# Patient Record
Sex: FEMALE | Race: WHITE | NOT HISPANIC OR LATINO | Employment: UNEMPLOYED | URBAN - METROPOLITAN AREA
[De-identification: names, ages, dates, MRNs, and addresses within clinical notes are randomized per-mention and may not be internally consistent; named-entity substitution may affect disease eponyms.]

---

## 2019-11-12 ENCOUNTER — OFFICE VISIT CONVERTED (OUTPATIENT)
Dept: FAMILY MEDICINE CLINIC | Facility: CLINIC | Age: 29
End: 2019-11-12
Attending: FAMILY MEDICINE

## 2019-11-12 ENCOUNTER — CONVERSION ENCOUNTER (OUTPATIENT)
Dept: FAMILY MEDICINE CLINIC | Facility: CLINIC | Age: 29
End: 2019-11-12

## 2020-09-28 ENCOUNTER — OFFICE VISIT CONVERTED (OUTPATIENT)
Dept: SURGERY | Facility: CLINIC | Age: 30
End: 2020-09-28
Attending: SURGERY

## 2020-10-29 ENCOUNTER — CONVERSION ENCOUNTER (OUTPATIENT)
Dept: FAMILY MEDICINE CLINIC | Facility: CLINIC | Age: 30
End: 2020-10-29

## 2020-10-29 ENCOUNTER — OFFICE VISIT CONVERTED (OUTPATIENT)
Dept: FAMILY MEDICINE CLINIC | Facility: CLINIC | Age: 30
End: 2020-10-29
Attending: FAMILY MEDICINE

## 2020-10-31 ENCOUNTER — HOSPITAL ENCOUNTER (OUTPATIENT)
Dept: PREADMISSION TESTING | Facility: HOSPITAL | Age: 30
Discharge: HOME | End: 2020-10-31
Attending: SURGERY

## 2020-11-04 LAB — SARS-COV-2 RNA SPEC QL NAA+PROBE: NOT DETECTED

## 2020-11-05 ENCOUNTER — HOSPITAL ENCOUNTER (OUTPATIENT)
Dept: PERIOP | Facility: HOSPITAL | Age: 30
Setting detail: HOSPITAL OUTPATIENT SURGERY
Discharge: HOME | End: 2020-11-05
Attending: SURGERY

## 2020-11-05 LAB — HCG UR QL: NEGATIVE

## 2021-01-19 ENCOUNTER — CONVERSION ENCOUNTER (OUTPATIENT)
Dept: FAMILY MEDICINE CLINIC | Facility: CLINIC | Age: 31
End: 2021-01-19

## 2021-01-19 ENCOUNTER — OFFICE VISIT CONVERTED (OUTPATIENT)
Dept: FAMILY MEDICINE CLINIC | Facility: CLINIC | Age: 31
End: 2021-01-19
Attending: FAMILY MEDICINE

## 2021-02-18 ENCOUNTER — HOSPITAL ENCOUNTER (OUTPATIENT)
Dept: LAB | Facility: HOSPITAL | Age: 31
Discharge: HOME | End: 2021-02-18
Attending: OTOLARYNGOLOGY

## 2021-02-18 LAB
APTT BLD: 24 S (ref 22.2–34.2)
BASOPHILS # BLD AUTO: 0.05 10*3/UL (ref 0–0.2)
BASOPHILS NFR BLD AUTO: 0.5 % (ref 0–3)
CONV ABS IMM GRAN: 0.03 10*3/UL (ref 0–0.2)
CONV IMMATURE GRAN: 0.3 % (ref 0–1.8)
DEPRECATED RDW RBC AUTO: 46.3 FL (ref 36.4–46.3)
EOSINOPHIL # BLD AUTO: 0.15 10*3/UL (ref 0–0.7)
EOSINOPHIL # BLD AUTO: 1.4 % (ref 0–7)
ERYTHROCYTE [DISTWIDTH] IN BLOOD BY AUTOMATED COUNT: 14.6 % (ref 11.7–14.4)
HCG SERPL-SCNC: NEGATIVE MMOL/L
HCT VFR BLD AUTO: 42.4 % (ref 37–47)
HGB BLD-MCNC: 13.3 G/DL (ref 12–16)
INR PPP: 0.9 (ref 2–3)
LYMPHOCYTES # BLD AUTO: 3.83 10*3/UL (ref 1–5)
LYMPHOCYTES NFR BLD AUTO: 35.4 % (ref 20–45)
MCH RBC QN AUTO: 27.2 PG (ref 27–31)
MCHC RBC AUTO-ENTMCNC: 31.4 G/DL (ref 33–37)
MCV RBC AUTO: 86.7 FL (ref 81–99)
MONOCYTES # BLD AUTO: 0.69 10*3/UL (ref 0.2–1.2)
MONOCYTES NFR BLD AUTO: 6.4 % (ref 3–10)
NEUTROPHILS # BLD AUTO: 6.06 10*3/UL (ref 2–8)
NEUTROPHILS NFR BLD AUTO: 56 % (ref 30–85)
NRBC CBCN: 0 % (ref 0–0.7)
PLATELET # BLD AUTO: 447 10*3/UL (ref 130–400)
PMV BLD AUTO: 10 FL (ref 9.4–12.3)
PROTHROMBIN TIME: 10.1 S (ref 9.4–12)
RBC # BLD AUTO: 4.89 10*6/UL (ref 4.2–5.4)
WBC # BLD AUTO: 10.81 10*3/UL (ref 4.8–10.8)

## 2021-02-19 LAB — SARS-COV-2 AB SERPL QL IA: POSITIVE

## 2021-02-22 ENCOUNTER — HOSPITAL ENCOUNTER (OUTPATIENT)
Dept: PREADMISSION TESTING | Facility: HOSPITAL | Age: 31
Discharge: HOME | End: 2021-02-22
Attending: OTOLARYNGOLOGY

## 2021-02-23 LAB — SARS-COV-2 RNA SPEC QL NAA+PROBE: NOT DETECTED

## 2021-02-26 ENCOUNTER — HOSPITAL ENCOUNTER (OUTPATIENT)
Dept: PERIOP | Facility: HOSPITAL | Age: 31
Setting detail: HOSPITAL OUTPATIENT SURGERY
Discharge: HOME | End: 2021-02-26
Attending: OTOLARYNGOLOGY

## 2021-02-26 LAB — HCG UR QL: NEGATIVE

## 2021-05-10 NOTE — H&P
History and Physical      Patient Name: Karine Swanson   Patient ID: [id number]   Sex: Female   YOB: 1990        Visit Date: September 28, 2020    Provider: Maninder Mallory MD   Location: [facility]   Location Address: [address] [place] [address]  [id number]   Location Phone: [phone]          Chief Complaint  · Outpatient History & Physical / Surgical Orders  · Gallbladder Consult      History Of Present Illness  Karine Swanson is a 30 year old /White female who presents to the office today as a consult from one of the local ER doctors.      The patient was referred by one of the local ER physicians after the patient was in the emergency room on 9/21/20 with right upper quadrant abdominal pain that radiated to her right mid back.  Ultrasound was done and showed gallstones.  No evidence of cholecystitis.  Common bile duct was about 4 mm in diameter.  White count was normal at 9 and LFTs normal except alk phos was mildly elevated at 110.  Patient says her abdominal pain is better now but anytime she eats certain food the abdominal pain returns.  She has had right upper quadrant abdominal pain on and off for several years and the pain is occurring more frequently and the pain has been getting gradually worse.  The patient is Islam.  She has never had any abdominal surgeries and she has 3 children.       Past Medical History  Disease Name Date Onset Notes   *No Pertinent Past Medical History --  --    Bladder Disorder --  --    GERD --  --    Limb Pain --  --    Limb Swelling --  --    Mood disorder --  --          Past Surgical History  Procedure Name Date Notes   *Denies any surgical procedures --  --    *I have had no surgeries --  --          Medication List  Name Date Started Instructions   amoxicillin 500 mg oral tablet 11/12/2019 take 1 tablet (500 mg) by oral route 3 times per day for 7 days   multivitamin oral  --          Allergy List  Allergen Name Date  "Reaction Notes   NO KNOWN DRUG ALLERGIES --  --  --          Family Medical History  Disease Name Relative/Age Notes   Heart Disease  --    Prostate cancer Grandfather (paternal)/   Grandfather (paternal)   Bladder calculus Mother/   Mother         Social History  Finding Status Start/Stop Quantity Notes   Alcohol Never --/-- --  --    Has no physical impairment --  --/-- --  --     --  --/-- --  --    Tobacco Never --/-- --  --          Review of Systems  · Constitutional  o Denies  o : fever, chills  · Gastrointestinal  o Denies  o : nausea, vomiting      Vitals  Date Time BP Position Site L\R Cuff Size HR RR TEMP (F) WT  HT  BMI kg/m2 BSA m2 O2 Sat HC       09/28/2020 11:45 AM       16  248lbs 0oz 5'  3\" 43.93 2.24           Physical Examination  · Constitutional  o Appearance  o : father-in-law present in room, alert and in no acute distress, reliable historian  · Head and Face  o Head  o :   § Inspection  § : no visable deformities or lesions  · Eyes  o Conjunctivae  o : clear  o Sclerae  o : clear  · Neck  o Inspection/Palpation  o : normal appearance, no masses, trachea midline  · Respiratory  o Respiratory Effort  o : breathing unlabored, respiratory effort appears normal  o Inspection of Chest  o : normal appearance, no retractions  · Cardiovascular  o Heart  o : regular rate and rhythm  · Gastrointestinal  o Abdominal Examination  o : soft, NT, ND  · Skin and Subcutaneous Tissue  o General Inspection  o : no visible concerning rashes or lesions present  · Neurologic  o Cranial Nerves  o : no obvious motor deficits  o Sensation  o : no obvious sensory deficits  o Gait and Station  o :   § Gait Screening  § : normal gait, able to stand without diffculty  o Cerebellar Function  o : no obvious abnormalities  · Psychiatric  o Judgment and Insight  o : judgment and insight intact  o Mood and Affect  o : mood normal, affect appropriate          Assessment  · Pre-Surgical Orders     V72.84  · Preop " testing     V72.84/Z01.818  · Symptomatic cholelithiasis     574.20/K80.20    Problems Reconciled  Plan  · Orders  o GENERAL SURGERY (GNSUR) - V72.84 - 10/15/2020  o [facility] Pre-Op Covid-19 Screening (16478) - V72.84/Z01.818 - 10/10/2020   at 12-noon.  · Medications  o Medications have been Reconciled  o Transition of Care or Provider Policy  · Instructions  o PLAN: Laparoscopic Cholecystectomy with Intraoperative Cholangiogram.  o PLEASE SIGN PERMIT FOR: Laparoscopic Cholecystectomy with Intraoperative Cholangiogram.  o Anesthesia: General   o Outpatient  o O.R. PREP: Per protocol  o IV: Per Anesthesia  o SCD's preoperatively  o No antibiotic is needed.  o The indications, options, risks, benefits, and expected outcomes of the planned procedure were discussed with the patient and the patient agrees to proceed.   o Electronically Identified Patient Education Materials Provided Electronically            Electronically Signed by: Maninder Mallory MD -Author on September 28, 2020 12:10:31 PM

## 2021-05-13 NOTE — PROGRESS NOTES
Progress Note      Patient Name: Karine Swanson   Patient ID: [id number]   Sex: Female   YOB: 1990    Primary Care Provider: Ethan Smith III MD    Visit Date: October 29, 2020    Provider: Ethan Smith III MD   Location: [facility] [place] [facility]   Location Address: [address] [place] [address]  [id number]   Location Phone: [phone]          Chief Complaint  · increased depression, crying more, not enjoying things      History Of Present Illness  Karine Swanson is a 30 year old /White female who presents for evaluation and treatment of: here for increased depression and anxiety, crying more and feeling bad about herself, not enjoying things. Pt states that she gets worse after each birth, she has 3 children under 3 with the youngest being 5 months. She would like to discuss getting her tonsils out. She is scheduled to have her gallbladder removed next Thursday.      HPI     patient is 30-year-old history of 3 children under the age of 3.  She has increased sadness and it seems worse after every new baby.  no suicidal ideation.  She is to have her gallbladder removed in a few days.      Review of systems     psych increased anhedonia, increased sadness, increase crying, decreased exercise recently.  No suicidal ideation  ENT recurrent strep, positive pharyngitis several a year would like tonsillectomy.    can get birth control at [facility] department.    Physical exam    Pulse ox is 98 heart rate 91 temperature 99.5 blood pressure 112/72 weight is 252 ear nose and throat tonsils are large.  Cardiovascular regular rhythm no murmur  Respiratory no increased work of breathing lungs clear and equal bilaterally  Abdomen soft nontender  Neurologic mentation is normal speech is normal  Psych patient appears depressed.  Depression screen is 15      Assessment     #1 unipolar depression needs will start on Prozac   #2 cholecystitis needs and is going to have her gallbladder  "surgery   #3 pharyngitis needs a tonsillectomy will need to get through her gallbladder surgery first and the depression.    We will see her again in 6 weeks    Plan     Prozac 10 mg daily for 1 week and then 20 mg daily.  I  ncrease exercise to 30-minute walk 5 days a week.    Recheck in 6 weeks.    Then we will schedule to have her tonsils evaluated.       Past Medical History  Disease Name Date Onset Notes   *No Pertinent Past Medical History --  --    Bladder Disorder --  --    GERD --  --    Limb Pain --  --    Limb Swelling --  --    Mood disorder --  --          Past Surgical History  Procedure Name Date Notes   *Denies any surgical procedures --  --    *I have had no surgeries --  --          Medication List  Name Date Started Instructions   multivitamin oral  --          Allergy List  Allergen Name Date Reaction Notes   NO KNOWN DRUG ALLERGIES --  --  --        Allergies Reconciled  Family Medical History  Disease Name Relative/Age Notes   Heart Disease  --    Prostate cancer Grandfather (paternal)/   Grandfather (paternal)   Bladder calculus Mother/   Mother         Social History  Finding Status Start/Stop Quantity Notes   Alcohol Never --/-- --  --    Has no physical impairment --  --/-- --  --     --  --/-- --  --    Tobacco Never --/-- --  --          Review of Systems  · Constitutional  o * See HPI  · Eyes  o * See HPI  · HENT  o * See HPI  · Breasts  o * See HPI  · Cardiovascular  o * See HPI  · Respiratory  o * See HPI  · Gastrointestinal  o * See HPI  · Genitourinary  o * See HPI  · Integument  o * See HPI  · Neurologic  o * See HPI  · Musculoskeletal  o * See HPI  · Endocrine  o * See HPI  · Psychiatric  o * See HPI  · Heme-Lymph  o * See HPI  · Allergic-Immunologic  o * See HPI      Vitals  Date Time BP Position Site L\R Cuff Size HR RR TEMP (F) WT  HT  BMI kg/m2 BSA m2 O2 Sat FR L/min FiO2 HC       10/29/2020 10:25 /72 Sitting    91 - R  99.5 251lbs 16oz 5'  3\" 44.64 2.25 98 %  21%  "                 Assessment  · Screening for depression     V79.0/Z13.89  · Major depressive disorder     296.20/F32.2  · Refused influenza vaccine     V64.06/Z28.21  · Anxiety and depression       Anxiety disorder, unspecified     300.00/F41.9  Major depressive disorder, single episode, unspecified     300.00/F32.9  · Chronic sore throat     472.1/J31.2  · Hypertrophy of tonsils     474.11/J35.1    Problems Reconciled  Plan  · Orders  o ACO-39: Current medications updated and reviewed (, 1159F) - - 10/29/2020  o ACO-18: Positive screen for clinical depression using a standardized tool and a follow-up plan documented () - - 10/29/2020  o ACO-14: Influenza immunization was not administered for reasons documented [facility] () - - 10/29/2020  · Medications  o fluoxetine 20 mg oral capsule   SIG: take 1 capsule (20 mg) by oral route once daily in the evening for 30 days   DISP: (30) Capsule with 3 refills  Prescribed on 10/29/2020     o amoxicillin 500 mg oral tablet   SIG: take 1 tablet (500 mg) by oral route 3 times per day for 7 days   DISP: (21) tablets with 0 refills  Discontinued on 10/29/2020     o Medications have been Reconciled  o Transition of Care or Provider Policy  · Instructions  o Depression Screen completed and scanned into the EMR under the designated folder within the patient's documents.  o Today's PHQ-9 result is __15_  o The provider screening met the required time of 15 minutes.  o Patient is taking medications as prescribed and doing well.   o Take all medications as prescribed/directed.  o Patient instructed/educated on their diet and exercise program.  o Patient was educated/instructed on their diagnosis, treatment and medications prior to discharge from the clinic today.  o Bring all medicines with their bottles to each office visit.  o Time spent with the patient was 30 minutes, more than 50% face to face.  o Chronic conditions reviewed and taken into consideration for today's  treatment plan.  o Discussed Covid-19 precautions including, but not limited to, social distancing, avoid touching your face, and hand washing.             Electronically Signed by: Vicky Culp, -Author on October 29, 2020 06:42:58 PM  Electronically Co-signed by: Ethan Smith III MD -Reviewer on November 2, 2020 01:48:40 PM

## 2021-05-14 VITALS
SYSTOLIC BLOOD PRESSURE: 112 MMHG | DIASTOLIC BLOOD PRESSURE: 62 MMHG | TEMPERATURE: 97.9 F | BODY MASS INDEX: 41.82 KG/M2 | HEIGHT: 63 IN | WEIGHT: 236 LBS | HEART RATE: 87 BPM | OXYGEN SATURATION: 99 %

## 2021-05-14 VITALS — HEIGHT: 63 IN | RESPIRATION RATE: 16 BRPM | WEIGHT: 248 LBS | BODY MASS INDEX: 43.94 KG/M2

## 2021-05-14 VITALS
HEART RATE: 91 BPM | WEIGHT: 252 LBS | HEIGHT: 63 IN | DIASTOLIC BLOOD PRESSURE: 72 MMHG | OXYGEN SATURATION: 98 % | TEMPERATURE: 99.5 F | BODY MASS INDEX: 44.65 KG/M2 | SYSTOLIC BLOOD PRESSURE: 112 MMHG

## 2021-05-14 NOTE — PROGRESS NOTES
Progress Note      Patient Name: Karine Swanson   Patient ID: [id number]   Sex: Female   YOB: 1990    Primary Care Provider: Ethan Smith III MD    Visit Date: January 19, 2021    Provider: Ethan Smith III MD   Location: [facility]   Location Address: [address] [place] [address]  [id number]   Location Phone: [phone]          Chief Complaint  · pain in tonsils  · follow up depression      History Of Present Illness  Karine Swanson is a 30 year old /White female who presents for evaluation and treatment of:      HPI     patient is a 30-year-old was placed on Prozac 20 mg is doing much better feeling better less anhedonia less sadness patient has had also had had recurrent strep throats and at least 4 to 5-year.  Patient needs a tonsillectomy.  This is a recurrent problem.    Review of systems     cardiovascular no chest pain no palpitations  Respiratory no shortness of breath no dyspnea on exertion ENT throat was quite sore couple days ago not so much now.  History of many recurrent episodes of strep pharyngitis 1 week appointment Dr. Diggs for tonsillectomy.      Physical exam     pulse ox 99 blood pressure 112/62 heart rate 87 weight is 236 is a 24 pound weight loss.  States she is feeling better temperature 97.9  General looks much better.  Obviously depression is much better.  ENT very mild pharyngitis tonsils are big.  No adenopathy.  Ears are negative.    Cardiovascular regular rhythm no murmur    Assessment     #1 depression much better   #2 recurrent strep pharyngitis at least 4 to 5-year for several years needs a tonsillectomy     plan     #1 continue Prozac   #2 strep pharyngitis recurrent point with Dr. Ashton       Past Medical History  Disease Name Date Onset Notes   *No Pertinent Past Medical History --  --    Anxiety and depression 10/29/2020 --    Bladder Disorder --  --    Chronic sore throat 10/29/2020 --    GERD --  --    Limb Pain  "--  --    Limb Swelling --  --    Mood disorder --  --    Refused influenza vaccine 10/29/2020 --          Past Surgical History  Procedure Name Date Notes   *Denies any surgical procedures --  --    *I have had no surgeries --  --          Medication List  Name Date Started Instructions   fluoxetine 20 mg oral capsule 01/19/2021 take 1 capsule (20 mg) by oral route once daily in the evening for 30 days   multivitamin oral  --          Allergy List  Allergen Name Date Reaction Notes   NO KNOWN DRUG ALLERGIES --  --  --        Allergies Reconciled  Family Medical History  Disease Name Relative/Age Notes   Heart Disease  --    Prostate cancer Grandfather (paternal)/   Grandfather (paternal)   Bladder calculus Mother/   Mother         Social History  Finding Status Start/Stop Quantity Notes   Alcohol Never --/-- --  --    Has no physical impairment --  --/-- --  --     --  --/-- --  --    Tobacco Never --/-- --  --          Review of Systems  · Constitutional  o * See HPI  · Eyes  o * See HPI  · HENT  o * See HPI  · Breasts  o * See HPI  · Cardiovascular  o * See HPI  · Respiratory  o * See HPI  · Gastrointestinal  o * See HPI  · Genitourinary  o * See HPI  · Integument  o * See HPI  · Neurologic  o * See HPI  · Musculoskeletal  o * See HPI  · Endocrine  o * See HPI  · Psychiatric  o * See HPI  · Heme-Lymph  o * See HPI  · Allergic-Immunologic  o * See HPI      Vitals  Date Time BP Position Site L\R Cuff Size HR RR TEMP (F) WT  HT  BMI kg/m2 BSA m2 O2 Sat FR L/min FiO2 HC       01/19/2021 03:11 /62 Sitting    87 - R  97.9 236lbs 0oz 5'  3\" 41.81 2.18 99 %  21%              Results  · In-Office Procedures  o Lab procedure  § Rapid strep screen (10614)   § Beta Strep Gp A Culture: Negative   § Internal Control Verified?: Yes       Assessment  · Anxiety and depression       Anxiety disorder, unspecified     300.00/F41.9  Major depressive disorder, single episode, unspecified     300.00/F32.9  · Chronic sore " throat     472.1/J31.2  · Refused influenza vaccine     V64.06/Z28.21  · Hypertrophy of tonsil     474.11/J35.1  · Recurrent tonsillitis     463/J03.91  · Medication management     V58.69/Z79.899    Problems Reconciled  Plan  · Orders  o ACO-14: Influenza immunization was not administered for reasons documented [facility] () - - 01/19/2021  o ACO-39: Current medications updated and reviewed (, 1159F) - - 01/19/2021  o ENT CONSULTATION (ENT[facility]) - 474.11/J35.1, 463/J03.91, 472.1/J31.2 - 01/20/2021  · Medications  o fluoxetine 20 mg oral capsule   SIG: take 1 capsule (20 mg) by oral route once daily in the evening for 30 days   DISP: (30) Capsule with 11 refills  Refilled on 01/19/2021     o Medications have been Reconciled  o Transition of Care or Provider Policy  · Instructions  o Patient is taking medications as prescribed and doing well.   o Take all medications as prescribed/directed.  o Patient instructed/educated on their diet and exercise program.  o Patient was educated/instructed on their diagnosis, treatment and medications prior to discharge from the clinic today.  o Bring all medicines with their bottles to each office visit.  o Time spent with the patient was 20 minutes, more than 50% face to face.  o Chronic conditions reviewed and taken into consideration for today's treatment plan.  o Discussed Covid-19 precautions including, but not limited to, social distancing, avoid touching your face, and hand washing.             Electronically Signed by: Vicky Culp, -Author on January 19, 2021 06:04:24 PM  Electronically Co-signed by: Ethan Smith III MD -Reviewer on January 20, 2021 11:15:58 AM

## 2021-05-15 VITALS
WEIGHT: 225 LBS | HEART RATE: 114 BPM | OXYGEN SATURATION: 98 % | HEIGHT: 63 IN | TEMPERATURE: 98.8 F | BODY MASS INDEX: 39.87 KG/M2 | DIASTOLIC BLOOD PRESSURE: 72 MMHG | SYSTOLIC BLOOD PRESSURE: 110 MMHG

## 2021-11-18 RX ORDER — FLUOXETINE HYDROCHLORIDE 40 MG/1
40 CAPSULE ORAL DAILY
Qty: 90 CAPSULE | Refills: 3 | Status: SHIPPED | OUTPATIENT
Start: 2021-11-18 | End: 2022-09-13

## 2022-02-09 ENCOUNTER — OFFICE VISIT (OUTPATIENT)
Dept: FAMILY MEDICINE CLINIC | Facility: CLINIC | Age: 32
End: 2022-02-09

## 2022-02-09 VITALS
TEMPERATURE: 97.8 F | HEIGHT: 63 IN | OXYGEN SATURATION: 97 % | SYSTOLIC BLOOD PRESSURE: 112 MMHG | DIASTOLIC BLOOD PRESSURE: 62 MMHG | WEIGHT: 278 LBS | HEART RATE: 102 BPM | BODY MASS INDEX: 49.26 KG/M2

## 2022-02-09 DIAGNOSIS — Z3A.28 28 WEEKS GESTATION OF PREGNANCY: Primary | ICD-10-CM

## 2022-02-09 PROBLEM — F32.A ANXIETY AND DEPRESSION: Status: ACTIVE | Noted: 2020-10-29

## 2022-02-09 PROBLEM — F41.9 ANXIETY AND DEPRESSION: Status: ACTIVE | Noted: 2020-10-29

## 2022-02-09 PROCEDURE — 99213 OFFICE O/P EST LOW 20 MIN: CPT | Performed by: FAMILY MEDICINE

## 2022-02-09 RX ORDER — AMOXICILLIN 875 MG/1
875 TABLET, COATED ORAL
COMMUNITY
Start: 2022-02-02 | End: 2022-02-12

## 2022-02-09 NOTE — PROGRESS NOTES
"Chief Complaint  Follow-up (needs referral to OBGYN, 28 week gestation went to the woman [facility] in [place] and one of babies is 15% smaller than the other.   )    SUBJECTIVE  Karine Swanson presents to [facility]    31-year-old has twins has 3 previous children some discordant growth about 15% needs OB/GYN will discuss with Dr. Sanabria    PAST MEDICAL HISTORY  No Known Allergies     No past surgical history on file.    Social History     Tobacco Use   • Smoking status: Never Smoker   • Smokeless tobacco: Never Used   Substance Use Topics   • Alcohol use: Never       No family history on file.     Health Maintenance Due   Topic Date Due   • ANNUAL PHYSICAL  Never done   • COVID-19 Vaccine (1) Never done   • TDAP/TD VACCINES (1 - Tdap) Never done   • INFLUENZA VACCINE  08/01/2021   • HEPATITIS C SCREENING  Never done   • PAP SMEAR  Never done        Last Completed Colonoscopy     This patient has no relevant Health Maintenance data.          REVIEW OF SYSTEMS    Cardiovascular no chest pain no palpitation  Respiratory no shortness of breath dyspnea on exertion    OBJECTIVE  Vitals:    02/09/22 1415   BP: 112/62   Pulse: 102   Temp: 97.8 °F (36.6 °C)   SpO2: 97%   Weight: 126 kg (278 lb)   Height: 160 cm (63\")     Body mass index is 49.25 kg/m².    PHYSICAL EXAM    General no distress  Cardiovascular regular rhythm no murmur  Respiratory lungs clear and equal bilaterally  Abdomen twin pregnancy 28 weeks soft nontender    ASSESSMENT & PLAN  Diagnoses and all orders for this visit:    1. 28 weeks gestation of pregnancy (Primary)  -     Ambulatory Referral to Obstetrics / Gynecology          Assessment twin pregnancy 28 weeks some discordant growth about 15% will discuss with OB/GYN tomorrow            Patient was given instructions and counseling regarding her condition or for health maintenance advice. Please see specific information pulled into the AVS if appropriate.   "

## 2022-02-10 ENCOUNTER — TELEPHONE (OUTPATIENT)
Dept: OBSTETRICS AND GYNECOLOGY | Facility: CLINIC | Age: 32
End: 2022-02-10

## 2022-02-10 NOTE — TELEPHONE ENCOUNTER
Called patient to tell her about her appt with Dr Retana 2/16 to be here at 8:45. Left message with neighbor.

## 2022-02-16 ENCOUNTER — INITIAL PRENATAL (OUTPATIENT)
Dept: OBSTETRICS AND GYNECOLOGY | Facility: CLINIC | Age: 32
End: 2022-02-16

## 2022-02-16 VITALS
WEIGHT: 278 LBS | DIASTOLIC BLOOD PRESSURE: 66 MMHG | BODY MASS INDEX: 46.32 KG/M2 | HEIGHT: 65 IN | SYSTOLIC BLOOD PRESSURE: 101 MMHG

## 2022-02-16 DIAGNOSIS — O30.049 TWIN PREGNANCY, DICHORIONIC/DIAMNIOTIC, UNSPECIFIED TRIMESTER: Primary | ICD-10-CM

## 2022-02-16 DIAGNOSIS — E66.9 OBESITY (BMI 30-39.9): ICD-10-CM

## 2022-02-16 LAB
ABO GROUP BLD: NORMAL
BASOPHILS # BLD AUTO: 0.04 10*3/MM3 (ref 0–0.2)
BASOPHILS NFR BLD AUTO: 0.3 % (ref 0–1.5)
BLD GP AB SCN SERPL QL: NEGATIVE
DEPRECATED RDW RBC AUTO: 39.4 FL (ref 37–54)
EOSINOPHIL # BLD AUTO: 0.41 10*3/MM3 (ref 0–0.4)
EOSINOPHIL NFR BLD AUTO: 3.3 % (ref 0.3–6.2)
ERYTHROCYTE [DISTWIDTH] IN BLOOD BY AUTOMATED COUNT: 13.6 % (ref 12.3–15.4)
GLUCOSE 1H P GLC SERPL-MCNC: 121 MG/DL (ref 65–139)
GLUCOSE UR STRIP-MCNC: NEGATIVE MG/DL
HBV SURFACE AG SERPL QL IA: NORMAL
HCT VFR BLD AUTO: 36.4 % (ref 34–46.6)
HCV AB SER DONR QL: NORMAL
HGB BLD-MCNC: 12.1 G/DL (ref 12–15.9)
HIV1+2 AB SER QL: NORMAL
IMM GRANULOCYTES # BLD AUTO: 0.3 10*3/MM3 (ref 0–0.05)
IMM GRANULOCYTES NFR BLD AUTO: 2.4 % (ref 0–0.5)
LYMPHOCYTES # BLD AUTO: 2.41 10*3/MM3 (ref 0.7–3.1)
LYMPHOCYTES NFR BLD AUTO: 19.4 % (ref 19.6–45.3)
MCH RBC QN AUTO: 27.2 PG (ref 26.6–33)
MCHC RBC AUTO-ENTMCNC: 33.2 G/DL (ref 31.5–35.7)
MCV RBC AUTO: 81.8 FL (ref 79–97)
MONOCYTES # BLD AUTO: 0.61 10*3/MM3 (ref 0.1–0.9)
MONOCYTES NFR BLD AUTO: 4.9 % (ref 5–12)
NEUTROPHILS NFR BLD AUTO: 69.7 % (ref 42.7–76)
NEUTROPHILS NFR BLD AUTO: 8.67 10*3/MM3 (ref 1.7–7)
NRBC BLD AUTO-RTO: 0.1 /100 WBC (ref 0–0.2)
PLATELET # BLD AUTO: 387 10*3/MM3 (ref 140–450)
PMV BLD AUTO: 9.4 FL (ref 6–12)
PROT UR STRIP-MCNC: NEGATIVE MG/DL
RBC # BLD AUTO: 4.45 10*6/MM3 (ref 3.77–5.28)
RH BLD: POSITIVE
WBC NRBC COR # BLD: 12.44 10*3/MM3 (ref 3.4–10.8)

## 2022-02-16 PROCEDURE — 86803 HEPATITIS C AB TEST: CPT | Performed by: OBSTETRICS & GYNECOLOGY

## 2022-02-16 PROCEDURE — 87147 CULTURE TYPE IMMUNOLOGIC: CPT | Performed by: OBSTETRICS & GYNECOLOGY

## 2022-02-16 PROCEDURE — 82950 GLUCOSE TEST: CPT | Performed by: OBSTETRICS & GYNECOLOGY

## 2022-02-16 PROCEDURE — 99204 OFFICE O/P NEW MOD 45 MIN: CPT | Performed by: OBSTETRICS & GYNECOLOGY

## 2022-02-16 PROCEDURE — G0432 EIA HIV-1/HIV-2 SCREEN: HCPCS | Performed by: OBSTETRICS & GYNECOLOGY

## 2022-02-16 PROCEDURE — 87661 TRICHOMONAS VAGINALIS AMPLIF: CPT | Performed by: OBSTETRICS & GYNECOLOGY

## 2022-02-16 PROCEDURE — 87591 N.GONORRHOEAE DNA AMP PROB: CPT | Performed by: OBSTETRICS & GYNECOLOGY

## 2022-02-16 PROCEDURE — 81002 URINALYSIS NONAUTO W/O SCOPE: CPT | Performed by: OBSTETRICS & GYNECOLOGY

## 2022-02-16 PROCEDURE — 86850 RBC ANTIBODY SCREEN: CPT | Performed by: OBSTETRICS & GYNECOLOGY

## 2022-02-16 PROCEDURE — G0123 SCREEN CERV/VAG THIN LAYER: HCPCS | Performed by: OBSTETRICS & GYNECOLOGY

## 2022-02-16 PROCEDURE — 86901 BLOOD TYPING SEROLOGIC RH(D): CPT | Performed by: OBSTETRICS & GYNECOLOGY

## 2022-02-16 PROCEDURE — 87086 URINE CULTURE/COLONY COUNT: CPT | Performed by: OBSTETRICS & GYNECOLOGY

## 2022-02-16 PROCEDURE — 86900 BLOOD TYPING SEROLOGIC ABO: CPT | Performed by: OBSTETRICS & GYNECOLOGY

## 2022-02-16 PROCEDURE — 86762 RUBELLA ANTIBODY: CPT | Performed by: OBSTETRICS & GYNECOLOGY

## 2022-02-16 PROCEDURE — 87491 CHLMYD TRACH DNA AMP PROBE: CPT | Performed by: OBSTETRICS & GYNECOLOGY

## 2022-02-16 PROCEDURE — 83020 HEMOGLOBIN ELECTROPHORESIS: CPT | Performed by: OBSTETRICS & GYNECOLOGY

## 2022-02-16 NOTE — PROGRESS NOTES
"OB Initial Visit    CC- Here for care of current pregnancy     Dates based on LMP.  Ultrasound at [facility] at approximately 27 weeks notes 15% discordance and confirms EDC.  Patient prefers delivery at hospital, midwife uncomfortable with twins with discordance.  We have fully discussed that 75% risk of  with nonvertex presentation.  Pelvis proven to 8 pounds 11 ounces.    Subjective:  31 y.o.  presenting for her first obstetrical visit.    LMP: No LMP recorded (lmp unknown). Patient is pregnant.         Reviewed and updated:  OBHx, GYNHx (STDs), PMHx, Medications, Allergies, PSHx, Social Hx, Preventative Hx (PAP), Hx of abuse/safe environment, Vaccine Hx including hx of chickenpox or vaccine, Genetic Hx (pt, FOB, both families).        Objective:  /66   Ht 165.1 cm (65\")   Wt 126 kg (278 lb)   LMP  (LMP Unknown)   BMI 46.26 kg/m²   General- NAD, alert and oriented, appropriate  Psych- Normal mood, good memory  Neck- No masses, no thyroid enlargement  CV- Regular rhythm, no murnurs  Resp- CTA to bases, no wheezes  Abdomen- Soft, non distended, non tender,     Breast left- No mass, non tender, no nipple discharge  Breast right- No mass, non tender, no nipple discharge    External genitalia- Normal, no lesions  Urethra- Normal, no masses, non tender  Vagina- Normal, no discharge  Bladder- Normal, no masses, non tender  Cvx- Normal, no lesions, no discharge, no CMT    Closed cervix    Uterus-gravid with twins    Fundal height 35 cm    Adnexa- Normal, no mass, non tender    Lymphatic- No palpable neck, axillary, or groin nodes  Ext- No edema, no cyanosis    Skin- No lesions, no rashes, no acanthosis nigricans      Assessment and Plan:  Diagnoses and all orders for this visit:    1. Twin pregnancy, dichorionic/diamniotic, unspecified trimester (Primary)  -     POC Urinalysis Dipstick  -     Urine Culture - Urine, Urine, Clean Catch  -     OB Panel With HIV  -     Hemoglobinopathy Fractionation " [place]  -     IGP,CtNg,rfx Apt HPV All Pth  -     US Ob 14 + Weeks Single or First Gestation; Future    2. Obesity (BMI 30-39.9)        29w2d    Genetic Screening: Counseled.  Declines all.     Vaccines: Pt declines FLU vaccine  Declines COVID vaccine    Counseling: Discussed risk of  with nonvertex presentation    Labs: Prenatal labs, cultures, and PAP performed (prn)    Return in about 2 weeks (around 3/2/2022).  And ultrasound for discordance    Nii Retana Sr., MD  2022

## 2022-02-17 LAB
BACTERIA SPEC AEROBE CULT: ABNORMAL
HGB A MFR BLD ELPH: 97.5 % (ref 96.4–98.8)
HGB A2 MFR BLD ELPH: 2.5 % (ref 1.8–3.2)
HGB F MFR BLD ELPH: 0 % (ref 0–2)
HGB FRACT BLD-IMP: NORMAL
HGB S MFR BLD ELPH: 0 %
RPR SER QL: NORMAL
RUBV IGG SERPL IA-ACNC: <0.9 INDEX

## 2022-02-21 LAB
C TRACH RRNA CVX QL NAA+PROBE: NEGATIVE
CONV .: NORMAL
CYTOLOGIST CVX/VAG CYTO: NORMAL
CYTOLOGY CVX/VAG DOC CYTO: NORMAL
CYTOLOGY CVX/VAG DOC THIN PREP: NORMAL
DX ICD CODE: NORMAL
HIV 1 & 2 AB SER-IMP: NORMAL
N GONORRHOEA RRNA CVX QL NAA+PROBE: NEGATIVE
OTHER STN SPEC: NORMAL
STAT OF ADQ CVX/VAG CYTO-IMP: NORMAL
T VAGINALIS RRNA SPEC QL NAA+PROBE: NEGATIVE

## 2022-02-28 ENCOUNTER — TELEPHONE (OUTPATIENT)
Dept: OBSTETRICS AND GYNECOLOGY | Facility: CLINIC | Age: 32
End: 2022-02-28

## 2022-03-03 ENCOUNTER — ROUTINE PRENATAL (OUTPATIENT)
Dept: OBSTETRICS AND GYNECOLOGY | Facility: CLINIC | Age: 32
End: 2022-03-03

## 2022-03-03 VITALS — DIASTOLIC BLOOD PRESSURE: 74 MMHG | WEIGHT: 283 LBS | BODY MASS INDEX: 47.09 KG/M2 | SYSTOLIC BLOOD PRESSURE: 108 MMHG

## 2022-03-03 DIAGNOSIS — O30.049 TWIN PREGNANCY, DICHORIONIC/DIAMNIOTIC, UNSPECIFIED TRIMESTER: Primary | ICD-10-CM

## 2022-03-03 DIAGNOSIS — R82.71 GBS BACTERIURIA: ICD-10-CM

## 2022-03-03 LAB
GLUCOSE UR STRIP-MCNC: NEGATIVE MG/DL
PROT UR STRIP-MCNC: NEGATIVE MG/DL

## 2022-03-03 PROCEDURE — 99214 OFFICE O/P EST MOD 30 MIN: CPT | Performed by: OBSTETRICS & GYNECOLOGY

## 2022-03-03 PROCEDURE — 81002 URINALYSIS NONAUTO W/O SCOPE: CPT | Performed by: OBSTETRICS & GYNECOLOGY

## 2022-03-03 RX ORDER — AMOXICILLIN 500 MG/1
500 CAPSULE ORAL 2 TIMES DAILY
Qty: 10 CAPSULE | Refills: 0 | Status: ON HOLD | OUTPATIENT
Start: 2022-03-03 | End: 2022-03-25

## 2022-03-03 NOTE — PROGRESS NOTES
Chief Complaint:  Scheduled OB visit    HPI: 31 y.o.  at 31w3d   TWINS  Positive baby movement, hard to differentiate twins.    Vitals:    22 1556   BP: 108/74   Weight: 128 kg (283 lb)       See OB flowsheet also for pregnancy related data.  Ultrasound today notes less than 1% discordance.  Estimated fetal weights are approximately 55th percentile.  Baby is currently vertex, breech.    A/P  Intrauterine pregnancy at 31w3d   Diagnoses and all orders for this visit:    1. Twin pregnancy, dichorionic/diamniotic, unspecified trimester (Primary)  -     POC Urinalysis Dipstick  -     US Ob 14 + Weeks Single or First Gestation; Future    2. GBS bacteriuria  -     amoxicillin (AMOXIL) 500 MG capsule; Take 1 capsule by mouth 2 (Two) Times a Day.  Dispense: 10 capsule; Refill: 0        Continue prenatal vitamins.  Encouraged fetal kick counts, 10 movements in 2 hours every day.  To labor and delivery if lack fetal movement    PLAN:   Return in about 2 weeks (around 3/17/2022).     Nii Retana Sr., MD  3/3/2022 16:31 EST

## 2022-03-17 ENCOUNTER — ROUTINE PRENATAL (OUTPATIENT)
Dept: OBSTETRICS AND GYNECOLOGY | Facility: CLINIC | Age: 32
End: 2022-03-17

## 2022-03-17 VITALS — BODY MASS INDEX: 49.42 KG/M2 | DIASTOLIC BLOOD PRESSURE: 75 MMHG | SYSTOLIC BLOOD PRESSURE: 113 MMHG | WEIGHT: 293 LBS

## 2022-03-17 DIAGNOSIS — O99.713 PRURITUS GRAVIDARUM, THIRD TRIMESTER: ICD-10-CM

## 2022-03-17 DIAGNOSIS — O30.049 TWIN PREGNANCY, DICHORIONIC/DIAMNIOTIC, UNSPECIFIED TRIMESTER: Primary | ICD-10-CM

## 2022-03-17 DIAGNOSIS — L29.9 PRURITUS GRAVIDARUM, THIRD TRIMESTER: ICD-10-CM

## 2022-03-17 LAB
DEPRECATED RDW RBC AUTO: 41.4 FL (ref 37–54)
ERYTHROCYTE [DISTWIDTH] IN BLOOD BY AUTOMATED COUNT: 14.1 % (ref 12.3–15.4)
GLUCOSE UR STRIP-MCNC: NEGATIVE MG/DL
HCT VFR BLD AUTO: 36.7 % (ref 34–46.6)
HGB BLD-MCNC: 11.5 G/DL (ref 12–15.9)
MCH RBC QN AUTO: 25.6 PG (ref 26.6–33)
MCHC RBC AUTO-ENTMCNC: 31.3 G/DL (ref 31.5–35.7)
MCV RBC AUTO: 81.7 FL (ref 79–97)
PLATELET # BLD AUTO: 315 10*3/MM3 (ref 140–450)
PMV BLD AUTO: 10.2 FL (ref 6–12)
PROT UR STRIP-MCNC: ABNORMAL MG/DL
RBC # BLD AUTO: 4.49 10*6/MM3 (ref 3.77–5.28)
WBC NRBC COR # BLD: 13.13 10*3/MM3 (ref 3.4–10.8)

## 2022-03-17 PROCEDURE — 82239 BILE ACIDS TOTAL: CPT | Performed by: OBSTETRICS & GYNECOLOGY

## 2022-03-17 PROCEDURE — 83540 ASSAY OF IRON: CPT | Performed by: OBSTETRICS & GYNECOLOGY

## 2022-03-17 PROCEDURE — 85027 COMPLETE CBC AUTOMATED: CPT | Performed by: OBSTETRICS & GYNECOLOGY

## 2022-03-17 PROCEDURE — 99214 OFFICE O/P EST MOD 30 MIN: CPT | Performed by: OBSTETRICS & GYNECOLOGY

## 2022-03-17 NOTE — PROGRESS NOTES
Chief Complaint:  Scheduled OB visit    HPI: 31 y.o.  at 33w3d   Positive baby movement  Patient has been lightheaded and has had some headaches.  We discussed increasing p.o. hydration.  Also complained of pain secondary to edema at pannus.  Also complains of itching off and on    Vitals:    22 1141   BP: 113/75   Weight: 135 kg (297 lb)       See OB flowsheet also for pregnancy related data.    A/P  Intrauterine pregnancy at 33w3d   Diagnoses and all orders for this visit:    1. Twin pregnancy, dichorionic/diamniotic, unspecified trimester (Primary)  -     POC Urinalysis Dipstick  -     Iron; Future  -     CBC (No Diff)    2. Pruritus gravidarum, third trimester  -     Bile Acids, Total    Ultrasound is scheduled for next office visit for growth    Continue prenatal vitamins.  Encouraged fetal kick counts, 10 movements in 2 hours every day.  To labor and delivery if lack fetal movement   Increase p.o. hydration    PLAN:   Return in about 2 weeks (around 3/31/2022).    Nii Retana Sr., MD  3/17/2022 12:07 EDT

## 2022-03-18 LAB — IRON 24H UR-MRATE: 35 MCG/DL (ref 37–145)

## 2022-03-19 LAB — BILE AC SERPL-SCNC: 8.5 UMOL/L (ref 0–10)

## 2022-03-22 ENCOUNTER — TELEPHONE (OUTPATIENT)
Dept: OBSTETRICS AND GYNECOLOGY | Facility: CLINIC | Age: 32
End: 2022-03-22

## 2022-03-22 NOTE — TELEPHONE ENCOUNTER
----- Message from Nii Retana Sr., MD sent at 3/20/2022  9:48 PM EDT -----  Bile acids below 10 is normal.  ----- Message -----  From: Lyndsey Hagen MA  Sent: 3/17/2022  11:45 AM EDT  To: Nii Retana Sr., MD

## 2022-03-25 ENCOUNTER — HOSPITAL ENCOUNTER (INPATIENT)
Facility: HOSPITAL | Age: 32
LOS: 3 days | Discharge: HOME | End: 2022-03-28
Attending: OBSTETRICS & GYNECOLOGY | Admitting: OBSTETRICS & GYNECOLOGY

## 2022-03-25 ENCOUNTER — ANESTHESIA (OUTPATIENT)
Dept: LABOR AND DELIVERY | Facility: HOSPITAL | Age: 32
End: 2022-03-25

## 2022-03-25 ENCOUNTER — ANESTHESIA EVENT (OUTPATIENT)
Dept: LABOR AND DELIVERY | Facility: HOSPITAL | Age: 32
End: 2022-03-25

## 2022-03-25 PROBLEM — O34.219 PREVIOUS CESAREAN DELIVERY AFFECTING PREGNANCY: Status: ACTIVE | Noted: 2022-03-25

## 2022-03-25 PROBLEM — O60.03 PRETERM LABOR IN THIRD TRIMESTER: Status: ACTIVE | Noted: 2022-03-25

## 2022-03-25 PROBLEM — O32.2XX0 TRANSVERSE LIE OF FETUS: Status: ACTIVE | Noted: 2022-03-25

## 2022-03-25 LAB
ABO GROUP BLD: NORMAL
BASE EXCESS BLDCOA CALC-SCNC: -4.2 MMOL/L
BASE EXCESS BLDCOA CALC-SCNC: -7.2 MMOL/L
BASE EXCESS BLDCOV CALC-SCNC: -7.1 MMOL/L
BASE EXCESS BLDCOV CALC-SCNC: -9 MMOL/L
BILIRUB BLD-MCNC: NEGATIVE MG/DL
BLD GP AB SCN SERPL QL: NEGATIVE
CLARITY, POC: CLEAR
COLOR UR: YELLOW
DEPRECATED RDW RBC AUTO: 42.2 FL (ref 37–54)
ERYTHROCYTE [DISTWIDTH] IN BLOOD BY AUTOMATED COUNT: 14.7 % (ref 12.3–15.4)
GLUCOSE UR STRIP-MCNC: NEGATIVE MG/DL
HCO3 BLDCOA-SCNC: 23 MMOL/L
HCO3 BLDCOA-SCNC: 23 MMOL/L
HCO3 BLDCOV-SCNC: 19.4 MMOL/L
HCO3 BLDCOV-SCNC: 22.1 MMOL/L
HCT VFR BLD AUTO: 36.5 % (ref 34–46.6)
HGB BLD-MCNC: 11.9 G/DL (ref 12–15.9)
KETONES UR QL: NEGATIVE
LEUKOCYTE EST, POC: NEGATIVE
MCH RBC QN AUTO: 26 PG (ref 26.6–33)
MCHC RBC AUTO-ENTMCNC: 32.6 G/DL (ref 31.5–35.7)
MCV RBC AUTO: 79.7 FL (ref 79–97)
NITRITE UR-MCNC: NEGATIVE MG/ML
PCO2 BLDCOA: 50.1 MMHG (ref 33–49)
PCO2 BLDCOA: 67.6 MMHG (ref 33–49)
PCO2 BLDCOV: 42.9 MM HG (ref 28–40)
PCO2 BLDCOV: 72.4 MM HG (ref 28–40)
PH BLDCOA: 7.15 PH UNITS (ref 7.21–7.31)
PH BLDCOA: 7.28 PH UNITS (ref 7.21–7.31)
PH BLDCOV: 7.1 PH UNITS (ref 7.31–7.37)
PH BLDCOV: 7.27 PH UNITS (ref 7.31–7.37)
PH UR: 6 [PH] (ref 5–8)
PLATELET # BLD AUTO: 320 10*3/MM3 (ref 140–450)
PMV BLD AUTO: 10.3 FL (ref 6–12)
PO2 BLDCOA: <40.5 MMHG
PO2 BLDCOA: <40.5 MMHG
PO2 BLDCOV: <40.5 MM HG (ref 21–31)
PROT UR STRIP-MCNC: NEGATIVE MG/DL
RBC # BLD AUTO: 4.58 10*6/MM3 (ref 3.77–5.28)
RBC # UR STRIP: ABNORMAL /UL
RH BLD: POSITIVE
SARS-COV-2 RNA PNL SPEC NAA+PROBE: NOT DETECTED
SP GR UR: 1.02 (ref 1–1.03)
T&S EXPIRATION DATE: NORMAL
UROBILINOGEN UR QL: NORMAL
WBC NRBC COR # BLD: 15.09 10*3/MM3 (ref 3.4–10.8)

## 2022-03-25 PROCEDURE — 59515 CESAREAN DELIVERY: CPT | Performed by: OBSTETRICS & GYNECOLOGY

## 2022-03-25 PROCEDURE — 94799 UNLISTED PULMONARY SVC/PX: CPT

## 2022-03-25 PROCEDURE — 86900 BLOOD TYPING SEROLOGIC ABO: CPT | Performed by: OBSTETRICS & GYNECOLOGY

## 2022-03-25 PROCEDURE — 25010000002 METOCLOPRAMIDE PER 10 MG: Performed by: OBSTETRICS & GYNECOLOGY

## 2022-03-25 PROCEDURE — 25010000002 KETOROLAC TROMETHAMINE PER 15 MG: Performed by: ANESTHESIOLOGY

## 2022-03-25 PROCEDURE — 51702 INSERT TEMP BLADDER CATH: CPT

## 2022-03-25 PROCEDURE — 63710000001 DIPHENHYDRAMINE PER 50 MG: Performed by: ANESTHESIOLOGY

## 2022-03-25 PROCEDURE — 81002 URINALYSIS NONAUTO W/O SCOPE: CPT | Performed by: OBSTETRICS & GYNECOLOGY

## 2022-03-25 PROCEDURE — 25010000002 CEFAZOLIN PER 500 MG: Performed by: OBSTETRICS & GYNECOLOGY

## 2022-03-25 PROCEDURE — 0 MORPHINE PER 10 MG: Performed by: ANESTHESIOLOGY

## 2022-03-25 PROCEDURE — 25010000002 METHYLERGONOVINE MALEATE PER 0.2 MG: Performed by: OBSTETRICS & GYNECOLOGY

## 2022-03-25 PROCEDURE — 86850 RBC ANTIBODY SCREEN: CPT | Performed by: OBSTETRICS & GYNECOLOGY

## 2022-03-25 PROCEDURE — 25010000002 MIDAZOLAM PER 1 MG: Performed by: ANESTHESIOLOGY

## 2022-03-25 PROCEDURE — U0004 COV-19 TEST NON-CDC HGH THRU: HCPCS | Performed by: OBSTETRICS & GYNECOLOGY

## 2022-03-25 PROCEDURE — 25010000002 FENTANYL CITRATE (PF) 50 MCG/ML SOLUTION: Performed by: ANESTHESIOLOGY

## 2022-03-25 PROCEDURE — 85027 COMPLETE CBC AUTOMATED: CPT | Performed by: OBSTETRICS & GYNECOLOGY

## 2022-03-25 PROCEDURE — 86901 BLOOD TYPING SEROLOGIC RH(D): CPT | Performed by: OBSTETRICS & GYNECOLOGY

## 2022-03-25 PROCEDURE — 88307 TISSUE EXAM BY PATHOLOGIST: CPT | Performed by: OBSTETRICS & GYNECOLOGY

## 2022-03-25 PROCEDURE — 82803 BLOOD GASES ANY COMBINATION: CPT | Performed by: OBSTETRICS & GYNECOLOGY

## 2022-03-25 PROCEDURE — 25010000002 HYDROMORPHONE 1 MG/ML SOLUTION: Performed by: ANESTHESIOLOGY

## 2022-03-25 RX ORDER — SODIUM CHLORIDE, SODIUM LACTATE, POTASSIUM CHLORIDE, CALCIUM CHLORIDE 600; 310; 30; 20 MG/100ML; MG/100ML; MG/100ML; MG/100ML
150 INJECTION, SOLUTION INTRAVENOUS CONTINUOUS
Status: DISCONTINUED | OUTPATIENT
Start: 2022-03-25 | End: 2022-03-28 | Stop reason: HOSPADM

## 2022-03-25 RX ORDER — FENTANYL CITRATE 50 UG/ML
INJECTION, SOLUTION INTRAMUSCULAR; INTRAVENOUS
Status: COMPLETED | OUTPATIENT
Start: 2022-03-25 | End: 2022-03-25

## 2022-03-25 RX ORDER — ONDANSETRON 2 MG/ML
4 INJECTION INTRAMUSCULAR; INTRAVENOUS EVERY 6 HOURS PRN
Status: DISCONTINUED | OUTPATIENT
Start: 2022-03-25 | End: 2022-03-28 | Stop reason: HOSPADM

## 2022-03-25 RX ORDER — MEPERIDINE HYDROCHLORIDE 25 MG/ML
12.5 INJECTION INTRAMUSCULAR; INTRAVENOUS; SUBCUTANEOUS
Status: ACTIVE | OUTPATIENT
Start: 2022-03-25 | End: 2022-03-26

## 2022-03-25 RX ORDER — IBUPROFEN 600 MG/1
600 TABLET ORAL EVERY 6 HOURS SCHEDULED
Status: DISCONTINUED | OUTPATIENT
Start: 2022-03-26 | End: 2022-03-28 | Stop reason: HOSPADM

## 2022-03-25 RX ORDER — CARBOPROST TROMETHAMINE 250 UG/ML
250 INJECTION, SOLUTION INTRAMUSCULAR AS NEEDED
Status: DISCONTINUED | OUTPATIENT
Start: 2022-03-25 | End: 2022-03-25 | Stop reason: HOSPADM

## 2022-03-25 RX ORDER — BETAMETHASONE SODIUM PHOSPHATE AND BETAMETHASONE ACETATE 3; 3 MG/ML; MG/ML
12 INJECTION, SUSPENSION INTRA-ARTICULAR; INTRALESIONAL; INTRAMUSCULAR; SOFT TISSUE EVERY 24 HOURS
Status: DISCONTINUED | OUTPATIENT
Start: 2022-03-25 | End: 2022-03-26

## 2022-03-25 RX ORDER — NALOXONE HCL 0.4 MG/ML
0.4 VIAL (ML) INJECTION ONCE AS NEEDED
Status: ACTIVE | OUTPATIENT
Start: 2022-03-25 | End: 2022-03-26

## 2022-03-25 RX ORDER — OXYCODONE HYDROCHLORIDE 5 MG/1
5 TABLET ORAL
Status: DISCONTINUED | OUTPATIENT
Start: 2022-03-25 | End: 2022-03-28 | Stop reason: HOSPADM

## 2022-03-25 RX ORDER — MISOPROSTOL 100 UG/1
200 TABLET ORAL
Status: CANCELLED | OUTPATIENT
Start: 2022-03-25 | End: 2022-03-25

## 2022-03-25 RX ORDER — MORPHINE SULFATE 0.5 MG/ML
INJECTION, SOLUTION EPIDURAL; INTRATHECAL; INTRAVENOUS AS NEEDED
Status: DISCONTINUED | OUTPATIENT
Start: 2022-03-25 | End: 2022-03-25 | Stop reason: SURG

## 2022-03-25 RX ORDER — DIPHENHYDRAMINE HCL 25 MG
25 CAPSULE ORAL EVERY 6 HOURS PRN
Status: DISPENSED | OUTPATIENT
Start: 2022-03-25 | End: 2022-03-26

## 2022-03-25 RX ORDER — CALCIUM CARBONATE 200(500)MG
1 TABLET,CHEWABLE ORAL EVERY 4 HOURS PRN
Status: DISCONTINUED | OUTPATIENT
Start: 2022-03-25 | End: 2022-03-28 | Stop reason: HOSPADM

## 2022-03-25 RX ORDER — TRISODIUM CITRATE DIHYDRATE AND CITRIC ACID MONOHYDRATE 500; 334 MG/5ML; MG/5ML
30 SOLUTION ORAL ONCE
Status: COMPLETED | OUTPATIENT
Start: 2022-03-25 | End: 2022-03-25

## 2022-03-25 RX ORDER — SODIUM CHLORIDE 0.9 % (FLUSH) 0.9 %
3 SYRINGE (ML) INJECTION EVERY 12 HOURS SCHEDULED
Status: DISCONTINUED | OUTPATIENT
Start: 2022-03-25 | End: 2022-03-25 | Stop reason: HOSPADM

## 2022-03-25 RX ORDER — SODIUM CHLORIDE, SODIUM LACTATE, POTASSIUM CHLORIDE, CALCIUM CHLORIDE 600; 310; 30; 20 MG/100ML; MG/100ML; MG/100ML; MG/100ML
125 INJECTION, SOLUTION INTRAVENOUS CONTINUOUS
Status: DISCONTINUED | OUTPATIENT
Start: 2022-03-25 | End: 2022-03-26

## 2022-03-25 RX ORDER — FAMOTIDINE 20 MG/1
20 TABLET, FILM COATED ORAL ONCE AS NEEDED
Status: DISCONTINUED | OUTPATIENT
Start: 2022-03-25 | End: 2022-03-28 | Stop reason: HOSPADM

## 2022-03-25 RX ORDER — DIPHENHYDRAMINE HYDROCHLORIDE 50 MG/ML
12.5 INJECTION INTRAMUSCULAR; INTRAVENOUS EVERY 6 HOURS PRN
Status: ACTIVE | OUTPATIENT
Start: 2022-03-25 | End: 2022-03-26

## 2022-03-25 RX ORDER — PHENYLEPHRINE HCL IN 0.9% NACL 1 MG/10 ML
SYRINGE (ML) INTRAVENOUS AS NEEDED
Status: DISCONTINUED | OUTPATIENT
Start: 2022-03-25 | End: 2022-03-25 | Stop reason: SURG

## 2022-03-25 RX ORDER — HYDROCODONE BITARTRATE AND ACETAMINOPHEN 5; 325 MG/1; MG/1
1 TABLET ORAL EVERY 4 HOURS PRN
Status: DISCONTINUED | OUTPATIENT
Start: 2022-03-26 | End: 2022-03-28 | Stop reason: HOSPADM

## 2022-03-25 RX ORDER — FENTANYL CITRATE 50 UG/ML
INJECTION, SOLUTION INTRAMUSCULAR; INTRAVENOUS AS NEEDED
Status: DISCONTINUED | OUTPATIENT
Start: 2022-03-25 | End: 2022-03-25 | Stop reason: SURG

## 2022-03-25 RX ORDER — METOCLOPRAMIDE HYDROCHLORIDE 5 MG/ML
10 INJECTION INTRAMUSCULAR; INTRAVENOUS
Status: COMPLETED | OUTPATIENT
Start: 2022-03-25 | End: 2022-03-25

## 2022-03-25 RX ORDER — ALUMINA, MAGNESIA, AND SIMETHICONE 2400; 2400; 240 MG/30ML; MG/30ML; MG/30ML
15 SUSPENSION ORAL EVERY 4 HOURS PRN
Status: DISCONTINUED | OUTPATIENT
Start: 2022-03-25 | End: 2022-03-28 | Stop reason: HOSPADM

## 2022-03-25 RX ORDER — BUPIVACAINE HYDROCHLORIDE 7.5 MG/ML
INJECTION, SOLUTION EPIDURAL; RETROBULBAR
Status: COMPLETED | OUTPATIENT
Start: 2022-03-25 | End: 2022-03-25

## 2022-03-25 RX ORDER — TRANEXAMIC ACID 10 MG/ML
1000 INJECTION, SOLUTION INTRAVENOUS ONCE AS NEEDED
Status: DISCONTINUED | OUTPATIENT
Start: 2022-03-25 | End: 2022-03-25 | Stop reason: HOSPADM

## 2022-03-25 RX ORDER — MISOPROSTOL 200 UG/1
800 TABLET ORAL AS NEEDED
Status: DISCONTINUED | OUTPATIENT
Start: 2022-03-25 | End: 2022-03-25 | Stop reason: HOSPADM

## 2022-03-25 RX ORDER — ONDANSETRON 4 MG/1
4 TABLET, FILM COATED ORAL EVERY 6 HOURS PRN
Status: DISCONTINUED | OUTPATIENT
Start: 2022-03-25 | End: 2022-03-28 | Stop reason: HOSPADM

## 2022-03-25 RX ORDER — HYDROCODONE BITARTRATE AND ACETAMINOPHEN 5; 325 MG/1; MG/1
1 TABLET ORAL EVERY 6 HOURS PRN
Status: ACTIVE | OUTPATIENT
Start: 2022-03-25 | End: 2022-03-26

## 2022-03-25 RX ORDER — METHYLERGONOVINE MALEATE 0.2 MG/ML
200 INJECTION INTRAVENOUS ONCE AS NEEDED
Status: DISCONTINUED | OUTPATIENT
Start: 2022-03-25 | End: 2022-03-25 | Stop reason: HOSPADM

## 2022-03-25 RX ORDER — FAMOTIDINE 10 MG/ML
20 INJECTION, SOLUTION INTRAVENOUS ONCE AS NEEDED
Status: DISCONTINUED | OUTPATIENT
Start: 2022-03-25 | End: 2022-03-28 | Stop reason: HOSPADM

## 2022-03-25 RX ORDER — FLUOXETINE HYDROCHLORIDE 20 MG/1
40 CAPSULE ORAL DAILY
Status: DISCONTINUED | OUTPATIENT
Start: 2022-03-25 | End: 2022-03-28 | Stop reason: HOSPADM

## 2022-03-25 RX ORDER — DOCUSATE SODIUM 100 MG/1
100 CAPSULE, LIQUID FILLED ORAL 2 TIMES DAILY
Status: DISCONTINUED | OUTPATIENT
Start: 2022-03-25 | End: 2022-03-28 | Stop reason: HOSPADM

## 2022-03-25 RX ORDER — KETOROLAC TROMETHAMINE 30 MG/ML
30 INJECTION, SOLUTION INTRAMUSCULAR; INTRAVENOUS EVERY 6 HOURS
Status: COMPLETED | OUTPATIENT
Start: 2022-03-25 | End: 2022-03-26

## 2022-03-25 RX ORDER — MIDAZOLAM HYDROCHLORIDE 1 MG/ML
INJECTION INTRAMUSCULAR; INTRAVENOUS AS NEEDED
Status: DISCONTINUED | OUTPATIENT
Start: 2022-03-25 | End: 2022-03-25 | Stop reason: SURG

## 2022-03-25 RX ORDER — BISACODYL 10 MG
10 SUPPOSITORY, RECTAL RECTAL DAILY PRN
Status: DISCONTINUED | OUTPATIENT
Start: 2022-03-25 | End: 2022-03-28 | Stop reason: HOSPADM

## 2022-03-25 RX ORDER — CARBOPROST TROMETHAMINE 250 UG/ML
250 INJECTION, SOLUTION INTRAMUSCULAR ONCE
Status: DISCONTINUED | OUTPATIENT
Start: 2022-03-25 | End: 2022-03-28 | Stop reason: HOSPADM

## 2022-03-25 RX ORDER — HYDROCODONE BITARTRATE AND ACETAMINOPHEN 5; 325 MG/1; MG/1
2 TABLET ORAL EVERY 4 HOURS PRN
Status: DISCONTINUED | OUTPATIENT
Start: 2022-03-26 | End: 2022-03-28 | Stop reason: HOSPADM

## 2022-03-25 RX ORDER — SODIUM CHLORIDE 0.9 % (FLUSH) 0.9 %
10 SYRINGE (ML) INJECTION AS NEEDED
Status: DISCONTINUED | OUTPATIENT
Start: 2022-03-25 | End: 2022-03-25 | Stop reason: HOSPADM

## 2022-03-25 RX ORDER — SODIUM CHLORIDE, SODIUM LACTATE, POTASSIUM CHLORIDE, CALCIUM CHLORIDE 600; 310; 30; 20 MG/100ML; MG/100ML; MG/100ML; MG/100ML
150 INJECTION, SOLUTION INTRAVENOUS CONTINUOUS
Status: DISCONTINUED | OUTPATIENT
Start: 2022-03-25 | End: 2022-03-26

## 2022-03-25 RX ORDER — BUTORPHANOL TARTRATE 1 MG/ML
2 INJECTION, SOLUTION INTRAMUSCULAR; INTRAVENOUS EVERY 6 HOURS PRN
Status: ACTIVE | OUTPATIENT
Start: 2022-03-25 | End: 2022-03-26

## 2022-03-25 RX ORDER — LIDOCAINE HYDROCHLORIDE 10 MG/ML
5 INJECTION, SOLUTION EPIDURAL; INFILTRATION; INTRACAUDAL; PERINEURAL AS NEEDED
Status: DISCONTINUED | OUTPATIENT
Start: 2022-03-25 | End: 2022-03-25 | Stop reason: HOSPADM

## 2022-03-25 RX ORDER — OXYTOCIN/0.9 % SODIUM CHLORIDE 30/500 ML
125 PLASTIC BAG, INJECTION (ML) INTRAVENOUS ONCE
Status: DISCONTINUED | OUTPATIENT
Start: 2022-03-25 | End: 2022-03-28 | Stop reason: HOSPADM

## 2022-03-25 RX ORDER — CEFAZOLIN SODIUM IN 0.9 % NACL 3 G/100 ML
3 INTRAVENOUS SOLUTION, PIGGYBACK (ML) INTRAVENOUS ONCE
Status: COMPLETED | OUTPATIENT
Start: 2022-03-25 | End: 2022-03-25

## 2022-03-25 RX ORDER — MORPHINE SULFATE 0.5 MG/ML
INJECTION, SOLUTION EPIDURAL; INTRATHECAL; INTRAVENOUS
Status: COMPLETED | OUTPATIENT
Start: 2022-03-25 | End: 2022-03-25

## 2022-03-25 RX ORDER — METHYLERGONOVINE MALEATE 0.2 MG/ML
INJECTION INTRAVENOUS AS NEEDED
Status: DISCONTINUED | OUTPATIENT
Start: 2022-03-25 | End: 2022-03-28 | Stop reason: HOSPADM

## 2022-03-25 RX ORDER — PRENATAL VIT/IRON FUM/FOLIC AC 27MG-0.8MG
1 TABLET ORAL DAILY
Status: DISCONTINUED | OUTPATIENT
Start: 2022-03-25 | End: 2022-03-28 | Stop reason: HOSPADM

## 2022-03-25 RX ADMIN — DOCUSATE SODIUM 100 MG: 100 CAPSULE, LIQUID FILLED ORAL at 12:43

## 2022-03-25 RX ADMIN — FENTANYL CITRATE 35 MCG: 50 INJECTION, SOLUTION INTRAMUSCULAR; INTRAVENOUS at 07:40

## 2022-03-25 RX ADMIN — MORPHINE SULFATE 2 MG: 0.5 INJECTION, SOLUTION EPIDURAL; INTRATHECAL; INTRAVENOUS at 07:40

## 2022-03-25 RX ADMIN — DIPHENHYDRAMINE HYDROCHLORIDE 25 MG: 25 CAPSULE ORAL at 18:41

## 2022-03-25 RX ADMIN — FENTANYL CITRATE 15 MCG: 50 INJECTION, SOLUTION INTRAMUSCULAR; INTRAVENOUS at 07:07

## 2022-03-25 RX ADMIN — BUPIVACAINE HYDROCHLORIDE 1.6 ML: 7.5 INJECTION, SOLUTION EPIDURAL; RETROBULBAR at 07:07

## 2022-03-25 RX ADMIN — Medication 200 MCG: at 07:33

## 2022-03-25 RX ADMIN — Medication 200 MCG: at 07:25

## 2022-03-25 RX ADMIN — CEFAZOLIN 3 G: 10 INJECTION, POWDER, FOR SOLUTION INTRAVENOUS; PARENTERAL at 06:40

## 2022-03-25 RX ADMIN — KETOROLAC TROMETHAMINE 30 MG: 30 INJECTION, SOLUTION INTRAMUSCULAR; INTRAVENOUS at 16:03

## 2022-03-25 RX ADMIN — SODIUM CHLORIDE, POTASSIUM CHLORIDE, SODIUM LACTATE AND CALCIUM CHLORIDE 1000 ML: 600; 310; 30; 20 INJECTION, SOLUTION INTRAVENOUS at 05:35

## 2022-03-25 RX ADMIN — KETOROLAC TROMETHAMINE 30 MG: 30 INJECTION, SOLUTION INTRAMUSCULAR; INTRAVENOUS at 10:39

## 2022-03-25 RX ADMIN — MORPHINE SULFATE 2.8 MG: 0.5 INJECTION, SOLUTION EPIDURAL; INTRATHECAL; INTRAVENOUS at 07:33

## 2022-03-25 RX ADMIN — SODIUM CHLORIDE, POTASSIUM CHLORIDE, SODIUM LACTATE AND CALCIUM CHLORIDE 150 ML/HR: 600; 310; 30; 20 INJECTION, SOLUTION INTRAVENOUS at 13:19

## 2022-03-25 RX ADMIN — Medication 100 MCG: at 07:02

## 2022-03-25 RX ADMIN — KETOROLAC TROMETHAMINE 30 MG: 30 INJECTION, SOLUTION INTRAMUSCULAR; INTRAVENOUS at 23:11

## 2022-03-25 RX ADMIN — Medication 100 MCG: at 07:20

## 2022-03-25 RX ADMIN — METOCLOPRAMIDE HYDROCHLORIDE 10 MG: 5 INJECTION INTRAMUSCULAR; INTRAVENOUS at 06:40

## 2022-03-25 RX ADMIN — SODIUM CHLORIDE, POTASSIUM CHLORIDE, SODIUM LACTATE AND CALCIUM CHLORIDE 125 ML/HR: 600; 310; 30; 20 INJECTION, SOLUTION INTRAVENOUS at 08:48

## 2022-03-25 RX ADMIN — DOCUSATE SODIUM 100 MG: 100 CAPSULE, LIQUID FILLED ORAL at 20:14

## 2022-03-25 RX ADMIN — FENTANYL CITRATE 50 MCG: 50 INJECTION, SOLUTION INTRAMUSCULAR; INTRAVENOUS at 07:33

## 2022-03-25 RX ADMIN — MIDAZOLAM HYDROCHLORIDE 1 MG: 1 INJECTION, SOLUTION INTRAMUSCULAR; INTRAVENOUS at 07:40

## 2022-03-25 RX ADMIN — HYDROMORPHONE HYDROCHLORIDE 0.5 MG: 1 INJECTION, SOLUTION INTRAMUSCULAR; INTRAVENOUS; SUBCUTANEOUS at 09:53

## 2022-03-25 RX ADMIN — MIDAZOLAM HYDROCHLORIDE 1 MG: 1 INJECTION, SOLUTION INTRAMUSCULAR; INTRAVENOUS at 07:34

## 2022-03-25 RX ADMIN — FLUOXETINE 40 MG: 20 CAPSULE ORAL at 12:43

## 2022-03-25 RX ADMIN — SODIUM CITRATE AND CITRIC ACID MONOHYDRATE 30 ML: 500; 334 SOLUTION ORAL at 06:40

## 2022-03-25 RX ADMIN — Medication 100 MCG: at 07:11

## 2022-03-25 RX ADMIN — OXYTOCIN 2 UNITS/MIN: 10 INJECTION, SOLUTION INTRAMUSCULAR; INTRAVENOUS at 07:21

## 2022-03-25 RX ADMIN — SODIUM CHLORIDE, POTASSIUM CHLORIDE, SODIUM LACTATE AND CALCIUM CHLORIDE 125 ML/HR: 600; 310; 30; 20 INJECTION, SOLUTION INTRAVENOUS at 05:38

## 2022-03-25 RX ADMIN — VITAMIN A, VITAMIN C, VITAMIN D, VITAMIN E, THIAMINE, RIBOFLAVIN, NIACIN, VITAMIN B6, FOLIC ACID, VITAMIN B12, CALCIUM, IRON, ZINC, COPPER 1 TABLET: 4000; 120; 400; 22; 1.84; 3; 20; 10; 1; 12; 200; 27; 25; 2 TABLET ORAL at 12:43

## 2022-03-25 RX ADMIN — MORPHINE SULFATE 0.2 MG: 0.5 INJECTION, SOLUTION EPIDURAL; INTRATHECAL; INTRAVENOUS at 07:07

## 2022-03-25 NOTE — ANESTHESIA PREPROCEDURE EVALUATION
Anesthesia Evaluation     Patient summary reviewed and Nursing notes reviewed   no history of anesthetic complications:  NPO Solid Status: > 8 hours  NPO Liquid Status: > 2 hours           Airway   Mallampati: II  TM distance: >3 FB  Neck ROM: full  No difficulty expected  Dental    (+) upper dentures    Pulmonary - negative pulmonary ROS and normal exam    breath sounds clear to auscultation  Cardiovascular - negative cardio ROS and normal exam  Exercise tolerance: good (4-7 METS)    Rhythm: regular  Rate: normal        Neuro/Psych- negative ROS  GI/Hepatic/Renal/Endo - negative ROS     Musculoskeletal (-) negative ROS    Abdominal    Substance History - negative use     OB/GYN    (+) Pregnant,         Other - negative ROS                       Anesthesia Plan    ASA 3     spinal       Anesthetic plan, all risks, benefits, and alternatives have been provided, discussed and informed consent has been obtained with: patient, spouse/significant other and other.        CODE STATUS:    Level Of Support Discussed With: Patient  Code Status (Patient has no pulse and is not breathing): CPR (Attempt to Resuscitate)  Medical Interventions (Patient has pulse or is breathing): Full

## 2022-03-25 NOTE — ANESTHESIA PROCEDURE NOTES
Spinal Block    Pre-sedation assessment completed: 3/25/2022 7:02 AM    Patient reassessed immediately prior to procedure    Patient location during procedure: OB  Start Time: 3/25/2022 7:02 AM  Stop Time: 3/25/2022 7:05 AM  Indication:at surgeon's request and post-op pain management  Performed By  Anesthesiologist: Ish Bhardwaj MD  Preanesthetic Checklist  Completed: patient identified, IV checked, risks and benefits discussed, surgical consent, monitors and equipment checked, pre-op evaluation and timeout performed  Spinal Block Prep:  Patient Position:sitting  Sterile Tech:cap, gloves, mask and sterile barriers  Prep:Chloraprep  Patient Monitoring:blood pressure monitoring, continuous pulse oximetry and EKG  Spinal Block Procedure  Approach:midline  Guidance:landmark technique and palpation technique  Location:L3-L4  Needle Type:Kristen  Needle Gauge:25 G  Placement of Spinal needle event:cerebrospinal fluid aspirated  Paresthesia: no  Fluid Appearance:clear  Medications: fentaNYL (SUBLIMAZE) injection, 15 mcg  bupivacaine PF (MARCAINE) injection 0.75%, 1.6 mL  morphine PF (DURAMORPH) injection, 0.2 mg  Med Administered at 3/25/2022 7:07 AM   Post Assessment  Patient Tolerance:patient tolerated the procedure well with no apparent complications  Complications no  Additional Notes  Skin infiltration with Lidocaine 1%. Introducer inserted, followed by spinal needle. + CSF return. Intrathecal marcaine and PF duramorph/fentanyl injected (see eMAR). Spinal needle/introducer removed. Site clean/dry/intact.

## 2022-03-26 LAB
BASOPHILS # BLD AUTO: 0.04 10*3/MM3 (ref 0–0.2)
BASOPHILS NFR BLD AUTO: 0.3 % (ref 0–1.5)
DEPRECATED RDW RBC AUTO: 46.8 FL (ref 37–54)
EOSINOPHIL # BLD AUTO: 0.3 10*3/MM3 (ref 0–0.4)
EOSINOPHIL NFR BLD AUTO: 2.2 % (ref 0.3–6.2)
ERYTHROCYTE [DISTWIDTH] IN BLOOD BY AUTOMATED COUNT: 15.6 % (ref 12.3–15.4)
HCT VFR BLD AUTO: 29.8 % (ref 34–46.6)
HGB BLD-MCNC: 9.6 G/DL (ref 12–15.9)
IMM GRANULOCYTES # BLD AUTO: 0.12 10*3/MM3 (ref 0–0.05)
IMM GRANULOCYTES NFR BLD AUTO: 0.9 % (ref 0–0.5)
LYMPHOCYTES # BLD AUTO: 2.51 10*3/MM3 (ref 0.7–3.1)
LYMPHOCYTES NFR BLD AUTO: 18.2 % (ref 19.6–45.3)
MCH RBC QN AUTO: 27.1 PG (ref 26.6–33)
MCHC RBC AUTO-ENTMCNC: 32.2 G/DL (ref 31.5–35.7)
MCV RBC AUTO: 84.2 FL (ref 79–97)
MONOCYTES # BLD AUTO: 0.93 10*3/MM3 (ref 0.1–0.9)
MONOCYTES NFR BLD AUTO: 6.8 % (ref 5–12)
NEUTROPHILS NFR BLD AUTO: 71.6 % (ref 42.7–76)
NEUTROPHILS NFR BLD AUTO: 9.86 10*3/MM3 (ref 1.7–7)
NRBC BLD AUTO-RTO: 0 /100 WBC (ref 0–0.2)
PLATELET # BLD AUTO: 272 10*3/MM3 (ref 140–450)
PMV BLD AUTO: 10.6 FL (ref 6–12)
RBC # BLD AUTO: 3.54 10*6/MM3 (ref 3.77–5.28)
WBC NRBC COR # BLD: 13.76 10*3/MM3 (ref 3.4–10.8)

## 2022-03-26 PROCEDURE — 0503F POSTPARTUM CARE VISIT: CPT | Performed by: OBSTETRICS & GYNECOLOGY

## 2022-03-26 PROCEDURE — 25010000002 KETOROLAC TROMETHAMINE PER 15 MG: Performed by: ANESTHESIOLOGY

## 2022-03-26 PROCEDURE — 85025 COMPLETE CBC W/AUTO DIFF WBC: CPT | Performed by: OBSTETRICS & GYNECOLOGY

## 2022-03-26 RX ADMIN — IBUPROFEN 600 MG: 600 TABLET ORAL at 11:39

## 2022-03-26 RX ADMIN — HYDROCODONE BITARTRATE AND ACETAMINOPHEN 1 TABLET: 5; 325 TABLET ORAL at 22:13

## 2022-03-26 RX ADMIN — VITAMIN A, VITAMIN C, VITAMIN D, VITAMIN E, THIAMINE, RIBOFLAVIN, NIACIN, VITAMIN B6, FOLIC ACID, VITAMIN B12, CALCIUM, IRON, ZINC, COPPER 1 TABLET: 4000; 120; 400; 22; 1.84; 3; 20; 10; 1; 12; 200; 27; 25; 2 TABLET ORAL at 09:12

## 2022-03-26 RX ADMIN — KETOROLAC TROMETHAMINE 30 MG: 30 INJECTION, SOLUTION INTRAMUSCULAR; INTRAVENOUS at 05:17

## 2022-03-26 RX ADMIN — DOCUSATE SODIUM 100 MG: 100 CAPSULE, LIQUID FILLED ORAL at 09:12

## 2022-03-26 RX ADMIN — SODIUM CHLORIDE, POTASSIUM CHLORIDE, SODIUM LACTATE AND CALCIUM CHLORIDE 125 ML/HR: 600; 310; 30; 20 INJECTION, SOLUTION INTRAVENOUS at 02:40

## 2022-03-26 RX ADMIN — IBUPROFEN 600 MG: 600 TABLET ORAL at 18:01

## 2022-03-26 RX ADMIN — DOCUSATE SODIUM 100 MG: 100 CAPSULE, LIQUID FILLED ORAL at 20:52

## 2022-03-26 RX ADMIN — FLUOXETINE 40 MG: 20 CAPSULE ORAL at 09:12

## 2022-03-26 NOTE — ANESTHESIA POSTPROCEDURE EVALUATION
Patient: Karine Swanson    Procedure Summary     Date: 22 Room / Location: [facility] LABOR DELIVERY    Anesthesia Start: 652 Anesthesia Stop: 801    Procedure:  SECTION PRIMARY (N/A Abdomen) Diagnosis: (Multiple Gestation)    Surgeons: Avi Kelly MD Provider: Ish Bhardwaj MD    Anesthesia Type: spinal ASA Status: 3          Anesthesia Type: spinal    Vitals  Vitals Value Taken Time   /55 22 0904   Temp 36.7 °C (98 °F) 22 0904   Pulse 76 22 0904   Resp 18 22 0904   SpO2 97 % 22 1058   Vitals shown include unvalidated device data.        Post Anesthesia Care and Evaluation    Patient location during evaluation: bedside  Patient participation: complete - patient participated  Level of consciousness: awake  Pain score: 0  Pain management: adequate  Airway patency: patent  Anesthetic complications: No anesthetic complications  PONV Status: none  Cardiovascular status: acceptable and stable  Respiratory status: acceptable and room air  Hydration status: acceptable  Post Neuraxial Block status: Motor and sensory function returned to baseline and No signs or symptoms of PDPH

## 2022-03-27 PROCEDURE — 0503F POSTPARTUM CARE VISIT: CPT | Performed by: OBSTETRICS & GYNECOLOGY

## 2022-03-27 RX ADMIN — FLUOXETINE 40 MG: 20 CAPSULE ORAL at 08:44

## 2022-03-27 RX ADMIN — HYDROCODONE BITARTRATE AND ACETAMINOPHEN 1 TABLET: 5; 325 TABLET ORAL at 10:38

## 2022-03-27 RX ADMIN — IBUPROFEN 600 MG: 600 TABLET ORAL at 20:20

## 2022-03-27 RX ADMIN — DOCUSATE SODIUM 100 MG: 100 CAPSULE, LIQUID FILLED ORAL at 08:05

## 2022-03-27 RX ADMIN — VITAMIN A, VITAMIN C, VITAMIN D, VITAMIN E, THIAMINE, RIBOFLAVIN, NIACIN, VITAMIN B6, FOLIC ACID, VITAMIN B12, CALCIUM, IRON, ZINC, COPPER 1 TABLET: 4000; 120; 400; 22; 1.84; 3; 20; 10; 1; 12; 200; 27; 25; 2 TABLET ORAL at 08:05

## 2022-03-27 RX ADMIN — IBUPROFEN 600 MG: 600 TABLET ORAL at 02:00

## 2022-03-27 RX ADMIN — IBUPROFEN 600 MG: 600 TABLET ORAL at 14:05

## 2022-03-27 RX ADMIN — IBUPROFEN 600 MG: 600 TABLET ORAL at 08:05

## 2022-03-28 VITALS
WEIGHT: 293 LBS | HEART RATE: 87 BPM | OXYGEN SATURATION: 99 % | BODY MASS INDEX: 51.91 KG/M2 | RESPIRATION RATE: 16 BRPM | TEMPERATURE: 97.9 F | DIASTOLIC BLOOD PRESSURE: 74 MMHG | SYSTOLIC BLOOD PRESSURE: 119 MMHG | HEIGHT: 63 IN

## 2022-03-28 PROCEDURE — 0503F POSTPARTUM CARE VISIT: CPT | Performed by: OBSTETRICS & GYNECOLOGY

## 2022-03-28 RX ORDER — IBUPROFEN 600 MG/1
600 TABLET ORAL EVERY 6 HOURS PRN
Qty: 60 TABLET | Refills: 1 | Status: SHIPPED | OUTPATIENT
Start: 2022-03-28 | End: 2022-04-25

## 2022-03-28 RX ORDER — HYDROCODONE BITARTRATE AND ACETAMINOPHEN 5; 325 MG/1; MG/1
1-2 TABLET ORAL EVERY 6 HOURS PRN
Qty: 22 TABLET | Refills: 0 | Status: SHIPPED | OUTPATIENT
Start: 2022-03-28 | End: 2022-04-25

## 2022-03-28 RX ORDER — DOCUSATE SODIUM 100 MG/1
100 CAPSULE, LIQUID FILLED ORAL 2 TIMES DAILY
Qty: 60 CAPSULE | Refills: 1 | Status: SHIPPED | OUTPATIENT
Start: 2022-03-28 | End: 2022-05-17

## 2022-03-28 RX ADMIN — DOCUSATE SODIUM 100 MG: 100 CAPSULE, LIQUID FILLED ORAL at 08:48

## 2022-03-28 RX ADMIN — HYDROCODONE BITARTRATE AND ACETAMINOPHEN 1 TABLET: 5; 325 TABLET ORAL at 05:10

## 2022-03-28 RX ADMIN — FLUOXETINE 40 MG: 20 CAPSULE ORAL at 09:21

## 2022-03-28 RX ADMIN — IBUPROFEN 600 MG: 600 TABLET ORAL at 05:10

## 2022-03-28 RX ADMIN — VITAMIN A, VITAMIN C, VITAMIN D, VITAMIN E, THIAMINE, RIBOFLAVIN, NIACIN, VITAMIN B6, FOLIC ACID, VITAMIN B12, CALCIUM, IRON, ZINC, COPPER 1 TABLET: 4000; 120; 400; 22; 1.84; 3; 20; 10; 1; 12; 200; 27; 25; 2 TABLET ORAL at 08:48

## 2022-03-30 ENCOUNTER — POSTPARTUM VISIT (OUTPATIENT)
Dept: OBSTETRICS AND GYNECOLOGY | Facility: CLINIC | Age: 32
End: 2022-03-30

## 2022-03-30 VITALS
DIASTOLIC BLOOD PRESSURE: 82 MMHG | HEIGHT: 63 IN | SYSTOLIC BLOOD PRESSURE: 121 MMHG | HEART RATE: 105 BPM | BODY MASS INDEX: 50.5 KG/M2 | WEIGHT: 285 LBS

## 2022-03-30 DIAGNOSIS — O30.049 TWIN PREGNANCY, DICHORIONIC/DIAMNIOTIC, UNSPECIFIED TRIMESTER: ICD-10-CM

## 2022-03-30 PROBLEM — O60.03 PRETERM LABOR IN THIRD TRIMESTER: Status: RESOLVED | Noted: 2022-03-25 | Resolved: 2022-03-30

## 2022-03-30 PROBLEM — O34.219 PREVIOUS CESAREAN DELIVERY AFFECTING PREGNANCY: Status: RESOLVED | Noted: 2022-03-25 | Resolved: 2022-03-30

## 2022-03-30 PROBLEM — O32.2XX0 TRANSVERSE LIE OF FETUS: Status: RESOLVED | Noted: 2022-03-25 | Resolved: 2022-03-30

## 2022-03-30 LAB
CYTO UR: NORMAL
LAB AP CASE REPORT: NORMAL
LAB AP CLINICAL INFORMATION: NORMAL
PATH REPORT.FINAL DX SPEC: NORMAL
PATH REPORT.GROSS SPEC: NORMAL

## 2022-03-30 PROCEDURE — 0503F POSTPARTUM CARE VISIT: CPT | Performed by: OBSTETRICS & GYNECOLOGY

## 2022-03-30 NOTE — ASSESSMENT & PLAN NOTE
Stable postpartum course.  Continue Motrin and/or hydrocodone as needed for postoperative pain  Continue lifting restrictions, no driving and pelvic rest.  The incision clean and dry.  Continue prenatal vitamins

## 2022-04-25 ENCOUNTER — POSTPARTUM VISIT (OUTPATIENT)
Dept: OBSTETRICS AND GYNECOLOGY | Facility: CLINIC | Age: 32
End: 2022-04-25

## 2022-04-25 VITALS
DIASTOLIC BLOOD PRESSURE: 88 MMHG | WEIGHT: 281 LBS | SYSTOLIC BLOOD PRESSURE: 137 MMHG | BODY MASS INDEX: 49.79 KG/M2 | HEIGHT: 63 IN | HEART RATE: 128 BPM

## 2022-04-25 DIAGNOSIS — Z30.09 BIRTH CONTROL COUNSELING: ICD-10-CM

## 2022-04-25 PROCEDURE — 99214 OFFICE O/P EST MOD 30 MIN: CPT | Performed by: OBSTETRICS & GYNECOLOGY

## 2022-04-25 RX ORDER — TRAZODONE HYDROCHLORIDE 50 MG/1
50 TABLET ORAL NIGHTLY
Qty: 30 TABLET | Refills: 4 | Status: SHIPPED | OUTPATIENT
Start: 2022-04-25 | End: 2023-03-23

## 2022-04-25 NOTE — PROGRESS NOTES
"POSTPARTUM Follow Up Visit    CC:  Postpartum     HPI:      Antepartum or Postpartum complications: Elevated BMI- pre-pregnancy  Delivery type:    Perineum: NA  Feeding: Bottle     Pain:  No  Vaginal Bleeding:  No  EPDS score: 16  Plans for BC: Undecided  Last PAP:   Last Completed Pap Smear     This patient has no relevant Health Maintenance data.          /88   Pulse (!) 128   Ht 160 cm (63\")   Wt 127 kg (281 lb)   LMP  (LMP Unknown)   Breastfeeding No   BMI 49.78 kg/m²     Physical Exam  Constitutional:       General: She is not in acute distress.     Appearance: Normal appearance. She is not ill-appearing.      Comments: Tearful and crying   Abdominal:      General: Abdomen is flat. There is no distension.      Palpations: Abdomen is soft. There is no mass.      Tenderness: There is no abdominal tenderness. There is no guarding or rebound.      Hernia: No hernia is present.   Musculoskeletal:      Right lower leg: No edema.      Left lower leg: No edema.   Skin:     General: Skin is warm and dry.      Findings: No rash.   Neurological:      Mental Status: She is alert and oriented to person, place, and time.   Psychiatric:         Mood and Affect: Mood normal.         Behavior: Behavior normal.         Thought Content: Thought content normal.         Judgment: Judgment normal.           ASSESSMENT AND PLAN:  Diagnoses and all orders for this visit:    1. Encounter for postpartum visit (Primary)  Assessment & Plan:  Stable postpartum course.  Continue prenatal vitamins.  Continue ibuprofen and/or Tylenol OTC for any further postoperative/postpartum pain.      2. Postpartum depression  Assessment & Plan:  The patient has a history of depression prior to the pregnancy.  She states that this is worsened over the last few weeks.  She is not getting much rest due to the twins and her other small children.  She reports that she has 5 children under the age of 5.  She denies any suicidal ideation or " homicidal ideation.  She currently is taking Prozac and it helps some.  Recommended adding an additional medication.  We will try trazodone 50 mg before bed at night and titrate as needed.  May consider an increase in the Prozac as well.  The patient is not improving then I would consider referring out to psychiatry or back to her PCP for further evaluation and management of this issue.    Orders:  -     traZODone (DESYREL) 50 MG tablet; Take 1 tablet by mouth Every Night.  Dispense: 30 tablet; Refill: 4    3.  delivery delivered    4. Birth control counseling  Assessment & Plan:  The risks, benefits, and alternatives of all birth control options were reviewed.  After reviewing the risks and benefits the patient is interested in IUD.  I have reviewed the different IUD options.  The patient has elected to proceed with Erastoetta.  No intercourse until IUD placement.  Recommended Tylenol and or ibuprofen right before the appointment time to help with pelvic cramping with IUD.  The specific risks of IUD placement and use were reviewed.        Counseling:  All birth control options reviewed in detail.  R/B/A/SE/E of each wrt pts PMHx and prior BC use.  May resume normal activities  Core strengthening exercises reviewed and recommended  Kegel exercises reviewed and recommended  Ok to return to work/school      Follow Up:  Return in about 2 weeks (around 2022) for Recheck.    Avi Kelly MD  2022

## 2022-04-26 PROBLEM — Z30.09 BIRTH CONTROL COUNSELING: Status: ACTIVE | Noted: 2022-04-26

## 2022-04-26 PROBLEM — O30.049 TWIN PREGNANCY, DICHORIONIC/DIAMNIOTIC, UNSPECIFIED TRIMESTER: Status: RESOLVED | Noted: 2022-02-16 | Resolved: 2022-04-26

## 2022-04-26 NOTE — ASSESSMENT & PLAN NOTE
The risks, benefits, and alternatives of all birth control options were reviewed.  After reviewing the risks and benefits the patient is interested in IUD.  I have reviewed the different IUD options.  The patient has elected to proceed with Gaviota.  No intercourse until IUD placement.  Recommended Tylenol and or ibuprofen right before the appointment time to help with pelvic cramping with IUD.  The specific risks of IUD placement and use were reviewed.

## 2022-04-26 NOTE — ASSESSMENT & PLAN NOTE
Stable postpartum course.  Continue prenatal vitamins.  Continue ibuprofen and/or Tylenol OTC for any further postoperative/postpartum pain.

## 2022-04-26 NOTE — ASSESSMENT & PLAN NOTE
The patient has a history of depression prior to the pregnancy.  She states that this is worsened over the last few weeks.  She is not getting much rest due to the twins and her other small children.  She reports that she has 5 children under the age of 5.  She denies any suicidal ideation or homicidal ideation.  She currently is taking Prozac and it helps some.  Recommended adding an additional medication.  We will try trazodone 50 mg before bed at night and titrate as needed.  May consider an increase in the Prozac as well.  The patient is not improving then I would consider referring out to psychiatry or back to her PCP for further evaluation and management of this issue.

## 2022-05-17 ENCOUNTER — OFFICE VISIT (OUTPATIENT)
Dept: OBSTETRICS AND GYNECOLOGY | Facility: CLINIC | Age: 32
End: 2022-05-17

## 2022-05-17 VITALS
HEIGHT: 63 IN | SYSTOLIC BLOOD PRESSURE: 118 MMHG | HEART RATE: 66 BPM | WEIGHT: 282 LBS | BODY MASS INDEX: 49.96 KG/M2 | DIASTOLIC BLOOD PRESSURE: 72 MMHG

## 2022-05-17 DIAGNOSIS — E66.9 OBESITY (BMI 30-39.9): ICD-10-CM

## 2022-05-17 DIAGNOSIS — Z30.09 BIRTH CONTROL COUNSELING: Primary | ICD-10-CM

## 2022-05-17 PROCEDURE — 99214 OFFICE O/P EST MOD 30 MIN: CPT | Performed by: OBSTETRICS & GYNECOLOGY

## 2022-05-17 RX ORDER — NORGESTIMATE AND ETHINYL ESTRADIOL 0.25-0.035
1 KIT ORAL DAILY
Qty: 28 TABLET | Refills: 12 | Status: SHIPPED | OUTPATIENT
Start: 2022-05-17 | End: 2023-02-06 | Stop reason: ALTCHOICE

## 2022-05-17 NOTE — PROGRESS NOTES
"GYN Visit    CC: Discussed birth control    HPI:   31 y.o. who presents for birth control.  The patient initially had elected to go with an IUD.  She did not bring any form of payment today and has decided she would prefer to try birth control pills.  She states she is feeling much better emotionally with the medication and her babies are doing much better.    History: PMHx, Meds, Allergies, PSHx, Social Hx, and POBHx all reviewed and updated.    /72   Pulse 66   Ht 160 cm (63\")   Wt 128 kg (282 lb)   LMP 2022   Breastfeeding No   BMI 49.95 kg/m²     Physical Exam  Vitals and nursing note reviewed.   Constitutional:       General: She is not in acute distress.     Appearance: Normal appearance. She is obese. She is not ill-appearing.   Musculoskeletal:      Right lower leg: No edema.      Left lower leg: No edema.   Skin:     General: Skin is warm and dry.      Findings: No rash.   Neurological:      Mental Status: She is alert and oriented to person, place, and time.   Psychiatric:         Mood and Affect: Mood normal.         Behavior: Behavior normal.         Thought Content: Thought content normal.         Judgment: Judgment normal.           ASSESSMENT AND PLAN:  Diagnoses and all orders for this visit:    1. Birth control counseling (Primary)  Assessment & Plan:  The patient was offered to reschedule her appointment for her IUD which she declined.  She desires to try birth control pills.  Plan to start low Ovral.  The patient will start the first day of her next menstrual cycle.  The risks, benefits, and alternatives of combined oral contraceptives were discussed and the patient wishes to proceed.    Orders:  -     norgestimate-ethinyl estradiol (ORTHO-CYCLEN) 0.25-35 MG-MCG per tablet; Take 1 tablet by mouth Daily.  Dispense: 28 tablet; Refill: 12    2. Postpartum depression  Assessment & Plan:  Continue Zoloft and trazodone.  Symptoms are improving.      3. Obesity (BMI " 30-39.9)  Assessment & Plan:  Discussed exercise, nutrition and recommended weight loss        Counseling: TRACK MENSES, RTO if <q21d, >7d long, heavy or painful.    All BIRTH CONTROL options R/B/A/SE/E of each reviewed in detail.  OCP/hormone use risk THROMBOEMBOLIC RISK reviewed.     Weight loss/Nutrition reviewed.      Follow Up:  Return if symptoms worsen or fail to improve.    Avi Kelly MD  05/17/2022

## 2022-05-18 NOTE — ASSESSMENT & PLAN NOTE
The patient was offered to reschedule her appointment for her IUD which she declined.  She desires to try birth control pills.  Plan to start low Ovral.  The patient will start the first day of her next menstrual cycle.  The risks, benefits, and alternatives of combined oral contraceptives were discussed and the patient wishes to proceed.

## 2022-09-13 DIAGNOSIS — F33.9 EPISODE OF RECURRENT MAJOR DEPRESSIVE DISORDER, UNSPECIFIED DEPRESSION EPISODE SEVERITY: Primary | ICD-10-CM

## 2022-09-13 RX ORDER — FLUOXETINE HYDROCHLORIDE 40 MG/1
CAPSULE ORAL
Qty: 90 CAPSULE | Refills: 0 | Status: SHIPPED | OUTPATIENT
Start: 2022-09-13 | End: 2022-12-08 | Stop reason: SDUPTHER

## 2022-10-20 ENCOUNTER — OFFICE VISIT (OUTPATIENT)
Dept: FAMILY MEDICINE CLINIC | Facility: CLINIC | Age: 32
End: 2022-10-20

## 2022-10-20 VITALS
SYSTOLIC BLOOD PRESSURE: 134 MMHG | WEIGHT: 292 LBS | TEMPERATURE: 98 F | HEART RATE: 76 BPM | DIASTOLIC BLOOD PRESSURE: 80 MMHG | BODY MASS INDEX: 51.74 KG/M2 | HEIGHT: 63 IN | OXYGEN SATURATION: 100 %

## 2022-10-20 DIAGNOSIS — F33.0 MAJOR DEPRESSIVE DISORDER, RECURRENT, MILD: Primary | ICD-10-CM

## 2022-10-20 PROCEDURE — 99213 OFFICE O/P EST LOW 20 MIN: CPT | Performed by: FAMILY MEDICINE

## 2022-10-20 RX ORDER — BUPROPION HYDROCHLORIDE 300 MG/1
300 TABLET ORAL DAILY
Qty: 30 TABLET | Refills: 6 | Status: SHIPPED | OUTPATIENT
Start: 2022-10-20 | End: 2023-03-23 | Stop reason: SDUPTHER

## 2022-10-20 RX ORDER — BUPROPION HYDROCHLORIDE 150 MG/1
150 TABLET ORAL DAILY
Qty: 7 TABLET | Refills: 0 | Status: SHIPPED | OUTPATIENT
Start: 2022-10-20 | End: 2022-12-08 | Stop reason: DRUGHIGH

## 2022-10-20 NOTE — PROGRESS NOTES
"Chief Complaint  Depression (Continue to take her prozac for her depression.  ) and not sleeping well (Since the twins.  They are still not sleeping through the night.  Dr kelly gave her trazodone and it helps but then she has a hard time waking up with the babies. )    SUBJECTIVE  Karine Swanson presents to [facility]    32-year-old significant depression taking Prozac 40 for 2 months and not getting much relief has had postpartum depressions before but none this severe this started about 2 months after she delivered and the her twins are now 6 months old she is not getting much sleep and needs to take some trazodone at night and have her  take care of the shoulder at night because she is not breast-feeding    PAST MEDICAL HISTORY  No Known Allergies     Past Surgical History:   •  SECTION    Procedure:  SECTION PRIMARY;  Surgeon: Avi Kelly MD;  Location: [facility] LABOR DELIVERY;  Service: Gynecology;  Laterality: N/A;   • LAPAROSCOPIC CHOLECYSTECTOMY   • TONSILLECTOMY       Social History     Tobacco Use   • Smoking status: Never   • Smokeless tobacco: Never   Substance Use Topics   • Alcohol use: Never       No family history on file.     Health Maintenance Due   Topic Date Due   • COVID-19 Vaccine (1) Never done   • ANNUAL PHYSICAL  Never done   • TDAP/TD VACCINES (1 - Tdap) Never done   • PAP SMEAR  Never done   • INFLUENZA VACCINE  2022        Last Completed Colonoscopy     This patient has no relevant Health Maintenance data.          REVIEW OF SYSTEMS    Psych patient states she will not hurt her self increased anhedonia increased sadness feels guilty about being overweight feels guilty about being a bad daughter no bad mother and about her wife    OBJECTIVE  Vitals:    10/20/22 1119   BP: 134/80   Pulse: 76   Temp: 98 °F (36.7 °C)   SpO2: 100%   Weight: 132 kg (292 lb)   Height: 160 cm (63\")     Body mass index is 51.73 " kg/m².    PHYSICAL EXAM    General patient obviously depressed will answer questions but sometimes on the second or third attempt  Cardiovascular regular no murmur  Lungs clear and equal bilaterally    ASSESSMENT & PLAN  There are no diagnoses linked to this encounter.      Postpartum depression start Wellbutrin consider counseling continue working outside is much as possible recheck in 5 weeks we will start with Wellbutrin 150 for a week and then go to 300 continue on the Prozac 40 consider counseling as well            Patient was given instructions and counseling regarding her condition or for health maintenance advice. Please see specific information pulled into the AVS if appropriate.

## 2022-11-05 NOTE — PROGRESS NOTES
"Post Delivery Follow up (1-4weeks)      CC: Incision check    Delivery type:    Perineum: NA  Feeding: Pumping    HPI:        HA:  No  Vision changes:  No  RUQ/epigastric pain:  No  Swelling:  Yes  Pain: Improving  Vaginal Bleeding:  Yes and appropriate  Depressed/Anxious:  No    No concerns or problems today.  Babies are doing well.  The larger twin to get to go home on Friday.       /82   Pulse 105   Ht 160 cm (63\")   Wt 129 kg (285 lb)   LMP  (LMP Unknown)   Breastfeeding Yes   BMI 50.49 kg/m²     Physical Exam  Vitals and nursing note reviewed.   Constitutional:       General: She is not in acute distress.     Appearance: Normal appearance. She is obese. She is not ill-appearing.   Abdominal:      General: Abdomen is flat. Bowel sounds are normal. There is no distension.      Palpations: Abdomen is soft. There is no mass.      Tenderness: There is abdominal tenderness. There is no guarding or rebound.      Hernia: No hernia is present.      Comments: There is mild tenderness to palpation around the incision.  The incision is clean, dry, intact and well-healing.  The incision is well approximated.  There are no signs of infection.   Musculoskeletal:         General: Swelling present.      Right lower leg: Edema present.      Left lower leg: Edema present.      Comments: There is still 1+ bilateral lower extremity edema, which is improved from her edema at discharge   Skin:     General: Skin is warm and dry.      Findings: No lesion or rash.   Neurological:      Mental Status: She is alert and oriented to person, place, and time.   Psychiatric:         Mood and Affect: Mood normal.         Behavior: Behavior normal.         Thought Content: Thought content normal.         Judgment: Judgment normal.         ASSESSMENT AND PLAN:  Diagnoses and all orders for this visit:    1. Encounter for postpartum visit (Primary)  Assessment & Plan:  Stable postpartum course.  Continue Motrin and/or " Subjective   Patient ID: Phil is a 76 year old male.    Chief Complaint   Patient presents with   • Office Visit   • Hospital F/U     Transition Care Management     Hospital discharge follow-up for 76-year-old retired  who was admitted to [facility] with shortness of breath.  He has small amount of fluid around the pericardium but otherwise his echo showed good cardiac function.  He does have diastolic dysfunction.  He was diuresed in the hospital put on a bowel regimen since he had signs of constipation.  Apparently had an external catheter put on while he was in the hospital which caused some penile lesions which are healing up.  He will follow-up with urology if this gets any worse.  Overall he feels like he is back to his baseline.    Phil has a past medical history of Atrial fibrillation (CMS/[facility]), Diabetes mellitus (CMS/HCC), Essential (primary) hypertension, and Skin ulcer (CMS/[facility]) (2/27/2019).  Phil has Anemia; Atrial fibrillation (CMS/HCC); Bony exostosis; Chronic anticoagulation; Chronic diastolic (congestive) heart failure (CMS/HCC); Diabetes mellitus with peripheral circulatory disorder (CMS/HCC); Fecal incontinence; Gait disturbance; Morbid obesity (CMS/HCC); Obstructive sleep apnea; Polyneuropathy, diabetic (CMS/HCC); Pulmonary nodules; Right-sided low back pain without sciatica; Tremor of both hands; Urinary frequency; Venous insufficiency; Chronic venous stasis dermatitis of both lower extremities; Vertigo, benign positional; HTN (hypertension); Gout; Edema; Atherosclerosis of coronary artery; Anemia in CKD (chronic kidney disease); Counseling on health promotion and disease prevention; Intertrigo; and Chronic kidney disease (CKD), stage IV (severe) (CMS/HCC) on their problem list.  Phil has a past surgical history that includes No past surgeries.  His family history includes Diabetes in his father and sister; Sarcoidosis in his mother.  Phil reports that he has never  hydrocodone as needed for postoperative pain  Continue lifting restrictions, no driving and pelvic rest.  The incision clean and dry.  Continue prenatal vitamins      2. Twin pregnancy, dichorionic/diamniotic, unspecified trimester    3.  delivery delivered      Counseling:    Postpartum/Postop  precautions reviewed.    Follow Up:  Return in about 4 weeks (around 2022) for Recheck.    Avi Kelly MD  2022   smoked. He has never used smokeless tobacco. He reports that he does not drink alcohol and does not use drugs.  Phil has a current medication list which includes the following prescription(s): pantoprazole, polyethylene glycol, docusate sodium-sennosides, colchicine, diltiazem, clotrimazole-betamethasone, b-d ultra-fine 33 lancets, diltiazem, insulin pen needle, rosuvastatin, febuxostat, warfarin, pregabalin, tamsulosin, blood glucose, meclizine, repaglinide, irbesartan, furosemide, insulin glargine, lancets misc., olopatadine, carvedilol, aspirin, and blood glucose.  Phil   Current Outpatient Medications   Medication Sig Dispense Refill   • pantoprazole (PROTONIX) 40 MG tablet Take 40 mg by mouth.     • polyethylene glycol (MIRALAX) 17 GM/SCOOP powder Take 17 g by mouth.     • docusate sodium-sennosides (SENOKOT S) 50-8.6 MG per tablet Take 1 tablet by mouth.     • colchicine (COLCRYS) 0.6 MG tablet Take 1 tablet by mouth daily. As directed. 90 tablet 3   • dilTIAZem (CARDIZEM CD) 180 MG 24 hr capsule TAKE 1 CAPSULE BY MOUTH DAILY. SWALLOW WHOLE     • clotrimazole-betamethasone (LOTRISONE) 1-0.05 % cream Apply topically 2 times daily. 60 g 3   • B-D ULTRA-FINE 33 LANCETS Misc use as directed 100 each 3   • dilTIAZem (DILACOR XR) 180 MG 24 hr capsule Take 180 mg by mouth.     • Insulin Pen Needle (B-D U/F PEN NEEDLE 5/16\") 31G X 8 MM Misc USE AS DIRECTED TWICE DAILY 200 each 3   • rosuvastatin (CRESTOR) 10 MG tablet Take 1 tablet at bedtime 90 tablet 3   • febuxostat (ULORIC) 40 MG Tab TAKE 1 TABLET BY MOUTH DAILY 90 tablet 3   • warfarin (COUMADIN) 4 MG tablet Take 2 tablets by mouth daily. 180 tablet 3   • pregabalin (LYRICA) 50 MG capsule Take 1 capsule by mouth 3 times daily. 270 capsule 3   • tamsulosin (FLOMAX) 0.4 MG Cap Take 0.4 mg by mouth at bedtime.     • blood glucose (Accu-Chek Nhung Plus) test strip three times daily. Dx: E11.65/Z79.4. Type 2 DM on insulin.  Check TID     • meclizine (ANTIVERT)  25 MG tablet TAKE 1 TABLET BY MOUTH THREE TIMES DAILY 30 tablet 1   • repaglinide (PRANDIN) 2 MG tablet TK 1 T PO TID B MEALS  3   • irbesartan (AVAPRO) 150 MG tablet TK 1 T PO D  1   • furosemide (LASIX) 40 MG tablet TK 2 TS PO D  0   • insulin glargine (LANTUS SOLOSTAR) 100 UNIT/ML pen-injector INJECT 65 UNITS UNDER SKIN DAILY     • Lancets Misc. Kit 1 each by Other route.     • olopatadine (PATADAY) 0.2 % ophthalmic solution Place 1 drop into both eyes daily. 2.5 mL 12   • carvedilol (COREG) 25 MG tablet Take 1 tablet by mouth 2 times daily (with meals). TAKE 1 TABLET BY MOUTH TWICE DAILY AFTER MEALS 180 tablet 3   • aspirin (ASPIR-81) 81 MG EC tablet      • blood glucose (ACCU-CHEK SIM) test strip test 2-3 times daily as directed       No current facility-administered medications for this visit.     Phil is allergic to allopurinol, ace inhibitors, and atorvastatin.      Review of Systems   All other systems reviewed and are negative.    Objective   Physical Exam  Constitutional:       Appearance: He is obese.      Comments: Presents in a wheelchair   HENT:      Head: Normocephalic and atraumatic.   Eyes:      Conjunctiva/sclera: Conjunctivae normal.   Cardiovascular:      Rate and Rhythm: Normal rate and regular rhythm.   Pulmonary:      Effort: Pulmonary effort is normal.      Breath sounds: Normal breath sounds.   Neurological:      Mental Status: He is alert. Mental status is at baseline.   Psychiatric:         Mood and Affect: Mood normal.         Behavior: Behavior normal.         Thought Content: Thought content normal.         Judgment: Judgment normal.       Assessment   Problem List Items Addressed This Visit        Cardiac and Vasculature    Atrial fibrillation (CMS/HCC)     Heart rate is controlled with carvedilol and diltiazem.  Stroke prevention with warfarin.  Recent INR was 2.5.         Chronic venous stasis dermatitis of both lower extremities     This is under control with topicals and  compression stockings.         HTN (hypertension) - Primary     Controlled on current regimen.            Endocrine and Metabolic    Diabetes mellitus with peripheral circulatory disorder (CMS/[facility])     Recent A1c was 5.9 indicating good sugar control.            Musculoskeletal and Injuries    Gout     No recent gout attacks.            Neuro    Polyneuropathy, diabetic (CMS/[facility])     This is quite severe.  He finished a course at [facility] ability lab for rehab.  We will have somebody coming into the house for physical therapy.  Able to use a walker at home but comes out with a wheelchair.

## 2022-12-08 ENCOUNTER — OFFICE VISIT (OUTPATIENT)
Dept: FAMILY MEDICINE CLINIC | Facility: CLINIC | Age: 32
End: 2022-12-08

## 2022-12-08 VITALS
OXYGEN SATURATION: 97 % | DIASTOLIC BLOOD PRESSURE: 99 MMHG | BODY MASS INDEX: 50.16 KG/M2 | SYSTOLIC BLOOD PRESSURE: 126 MMHG | HEART RATE: 86 BPM | HEIGHT: 63 IN | WEIGHT: 283.1 LBS | TEMPERATURE: 98.2 F

## 2022-12-08 DIAGNOSIS — R63.5 WEIGHT GAIN: Primary | ICD-10-CM

## 2022-12-08 DIAGNOSIS — Z01.89 ENCOUNTER FOR ROUTINE LABORATORY TESTING: ICD-10-CM

## 2022-12-08 DIAGNOSIS — Z30.41 ENCOUNTER FOR SURVEILLANCE OF CONTRACEPTIVE PILLS: ICD-10-CM

## 2022-12-08 DIAGNOSIS — Z13.29 SCREENING FOR THYROID DISORDER: ICD-10-CM

## 2022-12-08 DIAGNOSIS — Z13.6 ENCOUNTER FOR SCREENING FOR CARDIOVASCULAR DISORDERS: ICD-10-CM

## 2022-12-08 DIAGNOSIS — Z13.1 DIABETES MELLITUS SCREENING: ICD-10-CM

## 2022-12-08 DIAGNOSIS — F33.9 EPISODE OF RECURRENT MAJOR DEPRESSIVE DISORDER, UNSPECIFIED DEPRESSION EPISODE SEVERITY: ICD-10-CM

## 2022-12-08 LAB
DEPRECATED RDW RBC AUTO: 41 FL (ref 37–54)
ERYTHROCYTE [DISTWIDTH] IN BLOOD BY AUTOMATED COUNT: 14.1 % (ref 12.3–15.4)
HBA1C MFR BLD: 5.8 % (ref 4.8–5.6)
HCT VFR BLD AUTO: 38.4 % (ref 34–46.6)
HGB BLD-MCNC: 12.6 G/DL (ref 12–15.9)
MCH RBC QN AUTO: 26.6 PG (ref 26.6–33)
MCHC RBC AUTO-ENTMCNC: 32.8 G/DL (ref 31.5–35.7)
MCV RBC AUTO: 81 FL (ref 79–97)
PLATELET # BLD AUTO: 442 10*3/MM3 (ref 140–450)
PMV BLD AUTO: 10 FL (ref 6–12)
RBC # BLD AUTO: 4.74 10*6/MM3 (ref 3.77–5.28)
WBC NRBC COR # BLD: 8.3 10*3/MM3 (ref 3.4–10.8)

## 2022-12-08 PROCEDURE — 80053 COMPREHEN METABOLIC PANEL: CPT | Performed by: NURSE PRACTITIONER

## 2022-12-08 PROCEDURE — 84443 ASSAY THYROID STIM HORMONE: CPT | Performed by: NURSE PRACTITIONER

## 2022-12-08 PROCEDURE — 36415 COLL VENOUS BLD VENIPUNCTURE: CPT | Performed by: NURSE PRACTITIONER

## 2022-12-08 PROCEDURE — 83036 HEMOGLOBIN GLYCOSYLATED A1C: CPT | Performed by: NURSE PRACTITIONER

## 2022-12-08 PROCEDURE — 85027 COMPLETE CBC AUTOMATED: CPT | Performed by: NURSE PRACTITIONER

## 2022-12-08 PROCEDURE — 99213 OFFICE O/P EST LOW 20 MIN: CPT | Performed by: NURSE PRACTITIONER

## 2022-12-08 PROCEDURE — 80061 LIPID PANEL: CPT | Performed by: NURSE PRACTITIONER

## 2022-12-08 RX ORDER — FLUOXETINE HYDROCHLORIDE 40 MG/1
40 CAPSULE ORAL DAILY
Qty: 90 CAPSULE | Refills: 1 | Status: SHIPPED | OUTPATIENT
Start: 2022-12-08 | End: 2023-03-23 | Stop reason: SDUPTHER

## 2022-12-08 NOTE — PROGRESS NOTES
Chief Complaint  Depression (Follow up)    Subjective          Karine Swanson is a 32 y.o. female who presents to [facility]    History of Present Illness    Anxiety/depression - pt reports she is better with medication. However she stays stressed out with her weight. Pt phq-9 score is higher, pt often forgets to take her Wellbutrin.     Pt doesn't like to take her OCP as her  is then reminded daily that she is preventing pregnancy as he wants more kids.     PHQ-2 Total Score: 16   PHQ-9 Total Score: 16        Review of Systems   Constitutional: Negative for chills, fatigue and fever.   Respiratory: Negative for cough and shortness of breath.    Cardiovascular: Negative for chest pain and palpitations.   Gastrointestinal: Negative for constipation, diarrhea, nausea and vomiting.   Musculoskeletal: Negative for back pain and neck pain.   Skin: Negative for rash.   Neurological: Negative for dizziness and headaches.   Psychiatric/Behavioral: Positive for dysphoric mood. The patient is nervous/anxious.           Medical History: has no past medical history on file.     Surgical History: has a past surgical history that includes Tonsillectomy; Laparoscopic cholecystectomy; and  section (N/A, 3/25/2022).     Family History: family history is not on file.     Social History: reports that she has never smoked. She has never used smokeless tobacco. She reports that she does not drink alcohol and does not use drugs.    Allergies: Patient has no known allergies.      Health Maintenance Due   Topic Date Due   • COVID-19 Vaccine (1) Never done   • TDAP/TD VACCINES (1 - Tdap) Never done   • PAP SMEAR  Never done   • INFLUENZA VACCINE  2022            Current Outpatient Medications:   •  buPROPion XL (Wellbutrin XL) 300 MG 24 hr tablet, Take 1 tablet by mouth Daily., Disp: 30 tablet, Rfl: 6  •  FLUoxetine (PROzac) 40 MG capsule, Take 1 capsule by mouth Daily., Disp: 90 capsule,  "Rfl: 1  •  norgestimate-ethinyl estradiol (ORTHO-CYCLEN) 0.25-35 MG-MCG per tablet, Take 1 tablet by mouth Daily., Disp: 28 tablet, Rfl: 12  •  traZODone (DESYREL) 50 MG tablet, Take 1 tablet by mouth Every Night., Disp: 30 tablet, Rfl: 4      There is no immunization history for the selected administration types on file for this patient.      Objective       Vitals:    12/08/22 1140   BP: 126/99   Pulse: 86   Temp: 98.2 °F (36.8 °C)   SpO2: 97%   Weight: 128 kg (283 lb 1.6 oz)   Height: 160 cm (63\")      Body mass index is 50.15 kg/m².   Wt Readings from Last 3 Encounters:   12/08/22 128 kg (283 lb 1.6 oz)   10/20/22 132 kg (292 lb)   05/17/22 128 kg (282 lb)      BP Readings from Last 3 Encounters:   12/08/22 126/99   10/20/22 134/80   05/17/22 118/72        Physical Exam  Vitals reviewed.   Constitutional:       Appearance: Normal appearance. She is well-developed.   HENT:      Head: Normocephalic and atraumatic.   Eyes:      Conjunctiva/sclera: Conjunctivae normal.      Pupils: Pupils are equal, round, and reactive to light.   Cardiovascular:      Rate and Rhythm: Normal rate and regular rhythm.      Heart sounds: Normal heart sounds. No murmur heard.  Pulmonary:      Effort: Pulmonary effort is normal.      Breath sounds: Normal breath sounds. No wheezing or rhonchi.   Abdominal:      General: Bowel sounds are normal. There is no distension.      Palpations: Abdomen is soft.      Tenderness: There is no abdominal tenderness.   Skin:     General: Skin is warm and dry.   Neurological:      Mental Status: She is alert and oriented to person, place, and time.   Psychiatric:         Mood and Affect: Mood and affect normal.         Behavior: Behavior normal.         Thought Content: Thought content normal.         Judgment: Judgment normal.             Result Review :       Common labs    Common Labs 3/25/22 3/26/22 12/8/22 12/8/22 12/8/22 12/8/22      1234 1234 1234 1234   Glucose      81   BUN      9   Creatinine   "    0.67   Sodium      136   Potassium      4.2   Chloride      100   Calcium      9.4   Albumin      4.10   Total Bilirubin      0.2   Alkaline Phosphatase      120 (A)   AST (SGOT)      11   ALT (SGPT)      11   WBC 15.09 (A) 13.76 (A) 8.30      Hemoglobin 11.9 (A) 9.6 (A) 12.6      Hematocrit 36.5 29.8 (A) 38.4      Platelets 320 272 442      Total Cholesterol     199    Triglycerides     115    HDL Cholesterol     60    LDL Cholesterol      119 (A)    Hemoglobin A1C    5.80 (A)     (A) Abnormal value                             Assessment and Plan        Diagnoses and all orders for this visit:    1. Weight gain (Primary)  -     TSH    2. Diabetes mellitus screening  -     Hemoglobin A1c    3. Screening for thyroid disorder  -     TSH    4. Encounter for screening for cardiovascular disorders  -     Lipid Panel  -     Comprehensive Metabolic Panel    5. Encounter for routine laboratory testing  -     CBC (No Diff)    6. Encounter for surveillance of contraceptive pills  Comments:  will Refer to Sun for IUD placement.     7. Episode of recurrent major depressive disorder, unspecified depression episode severity (HCC)  -     FLUoxetine (PROzac) 40 MG capsule; Take 1 capsule by mouth Daily.  Dispense: 90 capsule; Refill: 1          Follow Up     Return for IUD placement w/ Sun Tejada. , Next scheduled follow up.    Patient was given instructions and counseling regarding her condition or for health maintenance advice. Please see specific information pulled into the AVS if appropriate.     JEREMY Granados

## 2022-12-09 LAB
ALBUMIN SERPL-MCNC: 4.1 G/DL (ref 3.5–5.2)
ALBUMIN/GLOB SERPL: 1.1 G/DL
ALP SERPL-CCNC: 120 U/L (ref 39–117)
ALT SERPL W P-5'-P-CCNC: 11 U/L (ref 1–33)
ANION GAP SERPL CALCULATED.3IONS-SCNC: 12.3 MMOL/L (ref 5–15)
AST SERPL-CCNC: 11 U/L (ref 1–32)
BILIRUB SERPL-MCNC: 0.2 MG/DL (ref 0–1.2)
BUN SERPL-MCNC: 9 MG/DL (ref 6–20)
BUN/CREAT SERPL: 13.4 (ref 7–25)
CALCIUM SPEC-SCNC: 9.4 MG/DL (ref 8.6–10.5)
CHLORIDE SERPL-SCNC: 100 MMOL/L (ref 98–107)
CHOLEST SERPL-MCNC: 199 MG/DL (ref 0–200)
CO2 SERPL-SCNC: 23.7 MMOL/L (ref 22–29)
CREAT SERPL-MCNC: 0.67 MG/DL (ref 0.57–1)
EGFRCR SERPLBLD CKD-EPI 2021: 120 ML/MIN/1.73
GLOBULIN UR ELPH-MCNC: 3.7 GM/DL
GLUCOSE SERPL-MCNC: 81 MG/DL (ref 65–99)
HDLC SERPL-MCNC: 60 MG/DL (ref 40–60)
LDLC SERPL CALC-MCNC: 119 MG/DL (ref 0–100)
LDLC/HDLC SERPL: 1.93 {RATIO}
POTASSIUM SERPL-SCNC: 4.2 MMOL/L (ref 3.5–5.2)
PROT SERPL-MCNC: 7.8 G/DL (ref 6–8.5)
SODIUM SERPL-SCNC: 136 MMOL/L (ref 136–145)
TRIGL SERPL-MCNC: 115 MG/DL (ref 0–150)
TSH SERPL DL<=0.05 MIU/L-ACNC: 1.59 UIU/ML (ref 0.27–4.2)
VLDLC SERPL-MCNC: 20 MG/DL (ref 5–40)

## 2023-02-06 ENCOUNTER — OFFICE VISIT (OUTPATIENT)
Dept: FAMILY MEDICINE CLINIC | Facility: CLINIC | Age: 33
End: 2023-02-06

## 2023-02-06 VITALS
DIASTOLIC BLOOD PRESSURE: 62 MMHG | HEART RATE: 71 BPM | SYSTOLIC BLOOD PRESSURE: 132 MMHG | OXYGEN SATURATION: 100 % | BODY MASS INDEX: 49.29 KG/M2 | WEIGHT: 278.2 LBS | TEMPERATURE: 97.9 F | HEIGHT: 63 IN

## 2023-02-06 DIAGNOSIS — Z30.430 ENCOUNTER FOR IUD INSERTION: Primary | ICD-10-CM

## 2023-02-06 DIAGNOSIS — Z30.430 VISIT FOR INSERTION OF INTRAUTERINE DEVICE: ICD-10-CM

## 2023-02-06 PROCEDURE — 99214 OFFICE O/P EST MOD 30 MIN: CPT | Performed by: NURSE PRACTITIONER

## 2023-02-06 PROCEDURE — 58300 INSERT INTRAUTERINE DEVICE: CPT | Performed by: NURSE PRACTITIONER

## 2023-02-06 NOTE — PROGRESS NOTES
"IUD Placement    Chief Complaint   Patient presents with   • Contraception     Iud        Sex in last 2 weeks:  no, LMP 2/2/23  Bhcg:  Not done  Uhcg:  Not done  Consent signed: yes  Last PAP date and result: unknown    Procedure was explained in detail.  She understands the potential risks include, but are not limited to, failure (pregnancy), pain, bleeding, uterine perforation, and infection.  Her questions have been answered.      Subjective:      Objective:  /62 (BP Location: Left arm, Patient Position: Sitting, Cuff Size: Large Adult)   Pulse 71   Temp 97.9 °F (36.6 °C) (Temporal)   Ht 160 cm (63\")   Wt 126 kg (278 lb 3.2 oz)   SpO2 100%   BMI 49.28 kg/m²   General- NAD, alert and oriented, appropriate  Psych- Normal mood, good memory  Abdomen- Soft, non distended, non tender, no masses  External genitalia- Normal, no lesions  Vagina- Normal, no discharge  Uterus- Uterus sounded to 7cm.  Anteverted.    Cervix- Normal without lesion or discharge.  Betadine/Hibiclens x3.  Tenaculum placed.  Mirena IUD placed without difficulty.    Strings cut 2cm.      Patient tolerated the procedure well.    Assessment and Plan:  Diagnoses and all orders for this visit:    1. Encounter for IUD insertion (Primary)  Comments:  Lot #[id number]  EXP feb/2025  Orders:  -     Levonorgestrel (MIRENA) 20 MCG/DAY intrauterine device IUD; 1 each by Intrauterine route Every 8 (Eight) Years.    2. Visit for insertion of intrauterine device        Counseling:  She was counseled on the use of the IUD.  It provides contraception for 8 years (Mirena), 5years (Kyleena/Liletta) or 3 years (Kellie) or 10 years (Copper).  Failure is <1%.  It does not protect her from STDs and condoms are recommended.  She has been instructed to check the strings monthly.     .  PRECAUTIONS-- If she has any fevers >101.5F, and abdominal/pelvic pain, or bleeding > 1pad/2hours, she needs to call our office or on call/ER (after hours/weekend).  She understands " the potential risk of pelvic inflammatory disease and the potential for an effect on her future fertility if she does not seek immediate evaluation.  Cramping due to the IUD should be controlled with a OTC reliever/NSAID.  If not, she should call our office.  If she misses a period and has a positive pregnancy test she must immediately seek medical attention due to the risk of ectopic pregnancy which can be life threatening.  She understands removal can sometimes be difficult and can require surgery.    Follow Up:  Return if symptoms worsen or fail to improve, for heavy bleeding, severe cramping or related problems .          Sun Tejada, APRN 02/06/2023    [address] [place] [address]  Dept: [phone]  Dept Fax: [phone]  Loc: [phone]  Loc Fax: [phone]   IUD Insertion Procedure Note    Indication: Desires long acting reversible contraception     Procedure Details   Urine pregnancy test was not done.  The risks (including infection, bleeding, pain, and uterine perforation) and benefits of the procedure were explained to the patient and Written informed consent was obtained.      Cervix cleansed with Betadine. Uterus sounded to 7 cm. IUD inserted without difficulty. String visible and trimmed.    IUD Information:  Mirena.    Condition:  Stable    Complications:  None    Patient tolerated the procedure well without complications.    Plan:    The patient was advised to call for any fever or for prolonged or severe pain or bleeding. She was advised to use OTC ibuprofen as needed for mild to moderate pain.     Attending Physician Documentation:  I was present for the entire procedure. and I was present for or performed the following:IUD insertion    Sun Tejada, JEREMY  2/6/2023  13:30 EST

## 2023-03-23 ENCOUNTER — OFFICE VISIT (OUTPATIENT)
Dept: FAMILY MEDICINE CLINIC | Facility: CLINIC | Age: 33
End: 2023-03-23

## 2023-03-23 VITALS
HEIGHT: 63 IN | DIASTOLIC BLOOD PRESSURE: 74 MMHG | OXYGEN SATURATION: 99 % | HEART RATE: 84 BPM | SYSTOLIC BLOOD PRESSURE: 120 MMHG | TEMPERATURE: 98.4 F | WEIGHT: 277 LBS | BODY MASS INDEX: 49.08 KG/M2

## 2023-03-23 DIAGNOSIS — E66.01 MORBID (SEVERE) OBESITY DUE TO EXCESS CALORIES: ICD-10-CM

## 2023-03-23 DIAGNOSIS — F33.9 EPISODE OF RECURRENT MAJOR DEPRESSIVE DISORDER, UNSPECIFIED DEPRESSION EPISODE SEVERITY: ICD-10-CM

## 2023-03-23 DIAGNOSIS — F32.A ANXIETY AND DEPRESSION: Primary | ICD-10-CM

## 2023-03-23 DIAGNOSIS — F41.9 ANXIETY AND DEPRESSION: Primary | ICD-10-CM

## 2023-03-23 DIAGNOSIS — Z79.899 MEDICATION MANAGEMENT: ICD-10-CM

## 2023-03-23 RX ORDER — BUPROPION HYDROCHLORIDE 300 MG/1
300 TABLET ORAL DAILY
Qty: 90 TABLET | Refills: 3 | Status: SHIPPED | OUTPATIENT
Start: 2023-03-23

## 2023-03-23 RX ORDER — FLUOXETINE HYDROCHLORIDE 40 MG/1
40 CAPSULE ORAL DAILY
Qty: 90 CAPSULE | Refills: 3 | Status: SHIPPED | OUTPATIENT
Start: 2023-03-23

## 2023-03-23 NOTE — PROGRESS NOTES
"Chief Complaint  Depression (Follow up depression medication.  She is taking bupropion and fluoxetine.)    SUBJECTIVE  Karine Swanson presents to [facility]    Patient is 32-year-old history of severe postpartum depression now doing better currently on bupropion 300 and Prozac 47 IUD counseled about the chance of postpartum depression again does have 5 children    PAST MEDICAL HISTORY  No Known Allergies     Past Surgical History:   •  SECTION    Procedure:  SECTION PRIMARY;  Surgeon: Avi Kelly MD;  Location: [facility] LABOR DELIVERY;  Service: Gynecology;  Laterality: N/A;   • LAPAROSCOPIC CHOLECYSTECTOMY   • TONSILLECTOMY       Social History     Tobacco Use   • Smoking status: Never   • Smokeless tobacco: Never   Substance Use Topics   • Alcohol use: Never       No family history on file.     Health Maintenance Due   Topic Date Due   • COVID-19 Vaccine (1) Never done   • TDAP/TD VACCINES (1 - Tdap) Never done   • ANNUAL PHYSICAL  Never done   • PAP SMEAR  Never done   • INFLUENZA VACCINE  2022        Last Completed Colonoscopy     This patient has no relevant Health Maintenance data.          REVIEW OF SYSTEMS    Cardiovascular no chest pain no palpitations discussed increasing exercise helping her lose weight  Respiratory no shortness of breath no dyspnea on exertion  GI discussed weight loss discussed plant-based diet    OBJECTIVE  Vitals:    23 1049   BP: 120/74   Pulse: 84   Temp: 98.4 °F (36.9 °C)   SpO2: 99%   Weight: 126 kg (277 lb)   Height: 160 cm (63\")     Body mass index is 49.07 kg/m².    PHYSICAL EXAM    General no distress looks much better  Cardiovascular regular rhythm no murmur  Lungs clear and equal bilaterally  Psych patient is much brighter much happier looking    ASSESSMENT & PLAN  Diagnoses and all orders for this visit:    1. Anxiety and depression (Primary)    2. Medication management    3. Body mass index (BMI) of 45.0 to " 49.9 in adult ([facility])    4. Morbid (severe) obesity due to excess calories ([facility])    5. Episode of recurrent major depressive disorder, unspecified depression episode severity ([facility])  -     buPROPion XL (Wellbutrin XL) 300 MG 24 hr tablet; Take 1 tablet by mouth Daily.  Dispense: 90 tablet; Refill: 3  -     FLUoxetine (PROzac) 40 MG capsule; Take 1 capsule by mouth Daily.  Dispense: 90 capsule; Refill: 3          Severe postpartum depression please avoid pregnancy continue meds both bupropion and Prozac increase exercise to at least 30 minutes a day plant-based diet for weight loss obesity BMI is 49            Patient was given instructions and counseling regarding her condition or for health maintenance advice. Please see specific information pulled into the AVS if appropriate.

## 2023-06-16 DIAGNOSIS — Z30.09 BIRTH CONTROL COUNSELING: ICD-10-CM

## 2023-06-16 RX ORDER — NORGESTIMATE AND ETHINYL ESTRADIOL 0.25-0.035
KIT ORAL
Qty: 84 TABLET | Refills: 0 | OUTPATIENT
Start: 2023-06-16

## 2023-06-16 NOTE — TELEPHONE ENCOUNTER
I received a refill request from [facility] on the pt's medication Estarylla.  I have denied the refills because the patient had a Mirena placed in February of 2023 by provider Sun Tejada at another facility.  She was last seen on 05/17/2022

## 2023-10-24 ENCOUNTER — OFFICE VISIT (OUTPATIENT)
Dept: FAMILY MEDICINE CLINIC | Facility: CLINIC | Age: 33
End: 2023-10-24
Payer: MEDICAID

## 2023-10-24 VITALS
BODY MASS INDEX: 43.94 KG/M2 | TEMPERATURE: 97.6 F | WEIGHT: 248 LBS | DIASTOLIC BLOOD PRESSURE: 77 MMHG | HEIGHT: 63 IN | OXYGEN SATURATION: 98 % | HEART RATE: 69 BPM | SYSTOLIC BLOOD PRESSURE: 110 MMHG

## 2023-10-24 DIAGNOSIS — Z30.9 ENCOUNTER FOR CONTRACEPTIVE MANAGEMENT, UNSPECIFIED TYPE: ICD-10-CM

## 2023-10-24 DIAGNOSIS — R35.0 URINE FREQUENCY: ICD-10-CM

## 2023-10-24 DIAGNOSIS — N76.0 ACUTE VAGINITIS: Primary | ICD-10-CM

## 2023-10-24 LAB
B-HCG UR QL: NEGATIVE
BILIRUB BLD-MCNC: NEGATIVE MG/DL
CANDIDA SPECIES: NEGATIVE
CLARITY, POC: ABNORMAL
COLOR UR: ABNORMAL
EXPIRATION DATE: NORMAL
GARDNERELLA VAGINALIS: POSITIVE
GLUCOSE UR STRIP-MCNC: NEGATIVE MG/DL
INTERNAL NEGATIVE CONTROL: NORMAL
INTERNAL POSITIVE CONTROL: NORMAL
KETONES UR QL: NEGATIVE
LEUKOCYTE EST, POC: NEGATIVE
Lab: NORMAL
NITRITE UR-MCNC: NEGATIVE MG/ML
PH UR: 6.5 [PH] (ref 5–8)
PROT UR STRIP-MCNC: NEGATIVE MG/DL
RBC # UR STRIP: ABNORMAL /UL
SP GR UR: 1.02 (ref 1–1.03)
T VAGINALIS DNA VAG QL PROBE+SIG AMP: NEGATIVE
UROBILINOGEN UR QL: ABNORMAL

## 2023-10-24 PROCEDURE — 87660 TRICHOMONAS VAGIN DIR PROBE: CPT | Performed by: NURSE PRACTITIONER

## 2023-10-24 PROCEDURE — 87480 CANDIDA DNA DIR PROBE: CPT | Performed by: NURSE PRACTITIONER

## 2023-10-24 PROCEDURE — 87510 GARDNER VAG DNA DIR PROBE: CPT | Performed by: NURSE PRACTITIONER

## 2023-10-24 RX ORDER — HYDROXYZINE HYDROCHLORIDE 25 MG/1
1 TABLET, FILM COATED ORAL EVERY 6 HOURS
COMMUNITY
Start: 2023-09-29

## 2023-10-24 RX ORDER — TRAZODONE HYDROCHLORIDE 100 MG/1
50-100 TABLET ORAL NIGHTLY PRN
COMMUNITY
Start: 2023-09-29

## 2023-10-24 RX ORDER — PROPRANOLOL HYDROCHLORIDE 20 MG/1
TABLET ORAL
COMMUNITY
Start: 2023-09-29

## 2023-10-24 RX ORDER — TRAZODONE HYDROCHLORIDE 50 MG/1
50 TABLET ORAL NIGHTLY
COMMUNITY

## 2023-10-24 RX ORDER — NORGESTIMATE AND ETHINYL ESTRADIOL 0.25-0.035
1 KIT ORAL DAILY
Qty: 28 TABLET | Refills: 12 | Status: SHIPPED | OUTPATIENT
Start: 2023-10-24

## 2023-10-24 RX ORDER — LORAZEPAM 1 MG/1
1 TABLET ORAL
COMMUNITY
Start: 2023-09-29

## 2023-10-24 NOTE — PROGRESS NOTES
"Chief Complaint  Vaginal Itching and Contraception (Discuss birthcontrol )    Subjective      Karine Swanson is a 33 year old female that presents to [facility] with c/o urinary frequency, vaginal itching and vaginal discharge. She does report a mild odor with it. She believes this comes on and gets worse every time she has intercourse.  She is currently on her menstrual cycle. She is not using any kind of birth control but she is wanting to start it again. She was previously on birth control pills until she had a mirena placed. It was removed back in June. She states that she was back and forth on whether or not she needed to be on it due to mixed opinions within the [facility] community. She has since decided that it is best for her to be on birth control. She has 5 children and does not want any more at this time. She does not smoke.  She denies any side effects from the pill when she was on it.     She states that from July-September she was inpatient at a behavioral health facility due to suicidal thoughts. She is feeling much better now. She is on medications and meets with a therapist regularly.             History of Present Illness    Current Outpatient Medications   Medication Instructions    buPROPion XL (WELLBUTRIN XL) 300 mg, Oral, Daily    FLUoxetine (PROZAC) 40 mg, Oral, Daily    norgestimate-ethinyl estradiol (ORTHO-CYCLEN) 0.25-35 MG-MCG per tablet 1 tablet, Oral, Daily    traZODone (DESYREL) 50 mg, Oral, Nightly       The following portions of the patient's history were reviewed and updated as appropriate: allergies, current medications, past family history, past medical history, past social history, past surgical history, and problem list.    Objective   Vital Signs:   /77 (BP Location: Right arm, Patient Position: Sitting)   Pulse 69   Temp 97.6 °F (36.4 °C) (Temporal)   Ht 160 cm (63\")   Wt 112 kg (248 lb)   SpO2 98%   BMI 43.93 kg/m²     Wt Readings from " Last 3 Encounters:   10/24/23 112 kg (248 lb)   06/21/23 119 kg (263 lb 6.4 oz)   03/23/23 126 kg (277 lb)     BP Readings from Last 3 Encounters:   10/24/23 110/77   06/21/23 131/84   03/23/23 120/74     Physical Exam  Vitals reviewed.   Constitutional:       Appearance: Normal appearance. She is well-developed. She is obese. She is not ill-appearing.   HENT:      Head: Normocephalic and atraumatic.   Eyes:      Conjunctiva/sclera: Conjunctivae normal.      Pupils: Pupils are equal, round, and reactive to light.   Cardiovascular:      Rate and Rhythm: Normal rate and regular rhythm.      Heart sounds: Normal heart sounds. No murmur heard.  Pulmonary:      Effort: Pulmonary effort is normal.      Breath sounds: Normal breath sounds.   Skin:     General: Skin is warm and dry.   Neurological:      Mental Status: She is alert and oriented to person, place, and time.   Psychiatric:         Mood and Affect: Mood and affect normal.         Behavior: Behavior normal.          Result Review :  The following data was reviewed by: JEREMY Ellsworth on 10/24/2023:          Lab Results (last 72 hours)       Procedure Component Value Units Date/Time    POCT pregnancy, urine [[id number]]  (Normal) Collected: 10/24/23 1412    Specimen: Urine Updated: 10/24/23 1413     HCG, Urine, QL Negative     Lot Number [id number]     Internal Positive Control Passed     Internal Negative Control Passed     Expiration Date 05/31/2024    POCT urinalysis dipstick, manual [[id number]]  (Abnormal) Collected: 10/24/23 1413    Specimen: Urine Updated: 10/24/23 1414     Color Dark Yellow     Clarity, UA Slightly Cloudy     Glucose, UA Negative mg/dL      Bilirubin Negative     Ketones, UA Negative     Specific Gravity  1.025     Blood, UA Trace     pH, Urine 6.5     Protein, POC Negative mg/dL      Urobilinogen, UA 0.2 E.U./dL     Leukocytes Negative     Nitrite, UA Negative    Gardnerella vaginalis, Trichomonas vaginalis, Candida albicans, DNA  - Swab, Vagina [[id number]] Collected: 10/24/23 1415    Specimen: Swab from Vagina Updated: 10/24/23 1415             No Images in the past 120 days found..    Lab Results   Component Value Date    COVID19 Not Detected 03/25/2022    POCPREGUR Negative 10/24/2023    INR 0.90 (L) 02/18/2021    BILIRUBINUR Negative 10/24/2023       Procedures        Assessment and Plan   Diagnoses and all orders for this visit:    1. Acute vaginitis (Primary)  -     Gardnerella vaginalis, Trichomonas vaginalis, Candida albicans, DNA - Swab, Vagina    2. Urine frequency  -     POCT urinalysis dipstick, manual    3. Encounter for contraceptive management, unspecified type  -     POCT pregnancy, urine  -     norgestimate-ethinyl estradiol (ORTHO-CYCLEN) 0.25-35 MG-MCG per tablet; Take 1 tablet by mouth Daily.  Dispense: 28 tablet; Refill: 12                  There are no discontinued medications.                                                                                 Follow Up   No follow-ups on file.  Patient was given instructions and counseling regarding her condition or for health maintenance advice. Please see specific information pulled into the AVS if appropriate.     Start your birth control on the upcoming Sunday. We will notify you when your vaginal screen results are available and will treat if necessary.       JEREMY Ellsworth  10/24/23  14:15 EDT

## 2023-10-25 RX ORDER — METRONIDAZOLE 500 MG/1
500 TABLET ORAL 2 TIMES DAILY
Qty: 14 TABLET | Refills: 0 | Status: SHIPPED | OUTPATIENT
Start: 2023-10-25 | End: 2023-11-01

## 2023-11-01 ENCOUNTER — OFFICE VISIT (OUTPATIENT)
Dept: FAMILY MEDICINE CLINIC | Facility: CLINIC | Age: 33
End: 2023-11-01
Payer: MEDICAID

## 2023-11-01 ENCOUNTER — REFERRAL TRIAGE (OUTPATIENT)
Dept: CASE MANAGEMENT | Facility: OTHER | Age: 33
End: 2023-11-01

## 2023-11-01 VITALS
HEIGHT: 63 IN | SYSTOLIC BLOOD PRESSURE: 118 MMHG | HEART RATE: 71 BPM | DIASTOLIC BLOOD PRESSURE: 70 MMHG | OXYGEN SATURATION: 99 % | TEMPERATURE: 97.4 F | WEIGHT: 250.6 LBS | BODY MASS INDEX: 44.4 KG/M2

## 2023-11-01 DIAGNOSIS — F41.9 ANXIETY AND DEPRESSION: Primary | ICD-10-CM

## 2023-11-01 DIAGNOSIS — Z79.899 MEDICATION MANAGEMENT: ICD-10-CM

## 2023-11-01 DIAGNOSIS — E66.01 OBESITY, MORBID: ICD-10-CM

## 2023-11-01 DIAGNOSIS — R74.8 ELEVATED ALKALINE PHOSPHATASE LEVEL: ICD-10-CM

## 2023-11-01 DIAGNOSIS — D64.9 ANEMIA, UNSPECIFIED TYPE: ICD-10-CM

## 2023-11-01 DIAGNOSIS — R73.03 PREDIABETES: ICD-10-CM

## 2023-11-01 DIAGNOSIS — E66.01 MORBID (SEVERE) OBESITY DUE TO EXCESS CALORIES: ICD-10-CM

## 2023-11-01 DIAGNOSIS — F32.A ANXIETY AND DEPRESSION: Primary | ICD-10-CM

## 2023-11-01 DIAGNOSIS — Z13.220 LIPID SCREENING: ICD-10-CM

## 2023-11-01 LAB
ALBUMIN SERPL-MCNC: 4.3 G/DL (ref 3.5–5.2)
ALBUMIN/GLOB SERPL: 1.2 G/DL
ALP SERPL-CCNC: 107 U/L (ref 39–117)
ALT SERPL W P-5'-P-CCNC: 26 U/L (ref 1–33)
ANION GAP SERPL CALCULATED.3IONS-SCNC: 10.2 MMOL/L (ref 5–15)
AST SERPL-CCNC: 21 U/L (ref 1–32)
BASOPHILS # BLD AUTO: 0.04 10*3/MM3 (ref 0–0.2)
BASOPHILS NFR BLD AUTO: 0.6 % (ref 0–1.5)
BILIRUB SERPL-MCNC: 0.3 MG/DL (ref 0–1.2)
BUN SERPL-MCNC: 11 MG/DL (ref 6–20)
BUN/CREAT SERPL: 14.3 (ref 7–25)
CALCIUM SPEC-SCNC: 9.9 MG/DL (ref 8.6–10.5)
CHLORIDE SERPL-SCNC: 101 MMOL/L (ref 98–107)
CHOLEST SERPL-MCNC: 187 MG/DL (ref 0–200)
CO2 SERPL-SCNC: 24.8 MMOL/L (ref 22–29)
CREAT SERPL-MCNC: 0.77 MG/DL (ref 0.57–1)
DEPRECATED RDW RBC AUTO: 40 FL (ref 37–54)
EGFRCR SERPLBLD CKD-EPI 2021: 105.3 ML/MIN/1.73
EOSINOPHIL # BLD AUTO: 0.26 10*3/MM3 (ref 0–0.4)
EOSINOPHIL NFR BLD AUTO: 3.7 % (ref 0.3–6.2)
ERYTHROCYTE [DISTWIDTH] IN BLOOD BY AUTOMATED COUNT: 12.5 % (ref 12.3–15.4)
FERRITIN SERPL-MCNC: 64.8 NG/ML (ref 13–150)
GLOBULIN UR ELPH-MCNC: 3.6 GM/DL
GLUCOSE SERPL-MCNC: 89 MG/DL (ref 65–99)
HBA1C MFR BLD: 5.2 % (ref 4.8–5.6)
HCT VFR BLD AUTO: 39.2 % (ref 34–46.6)
HDLC SERPL-MCNC: 57 MG/DL (ref 40–60)
HGB BLD-MCNC: 12.6 G/DL (ref 12–15.9)
IMM GRANULOCYTES # BLD AUTO: 0.02 10*3/MM3 (ref 0–0.05)
IMM GRANULOCYTES NFR BLD AUTO: 0.3 % (ref 0–0.5)
IRON 24H UR-MRATE: 46 MCG/DL (ref 37–145)
IRON SATN MFR SERPL: 11 % (ref 20–50)
LDLC SERPL CALC-MCNC: 114 MG/DL (ref 0–100)
LDLC/HDLC SERPL: 1.98 {RATIO}
LYMPHOCYTES # BLD AUTO: 2.65 10*3/MM3 (ref 0.7–3.1)
LYMPHOCYTES NFR BLD AUTO: 37.3 % (ref 19.6–45.3)
MCH RBC QN AUTO: 28.3 PG (ref 26.6–33)
MCHC RBC AUTO-ENTMCNC: 32.1 G/DL (ref 31.5–35.7)
MCV RBC AUTO: 87.9 FL (ref 79–97)
MONOCYTES # BLD AUTO: 0.51 10*3/MM3 (ref 0.1–0.9)
MONOCYTES NFR BLD AUTO: 7.2 % (ref 5–12)
NEUTROPHILS NFR BLD AUTO: 3.63 10*3/MM3 (ref 1.7–7)
NEUTROPHILS NFR BLD AUTO: 50.9 % (ref 42.7–76)
NRBC BLD AUTO-RTO: 0 /100 WBC (ref 0–0.2)
PATHOLOGY REVIEW: YES
PLATELET # BLD AUTO: 436 10*3/MM3 (ref 140–450)
PMV BLD AUTO: 9.7 FL (ref 6–12)
POTASSIUM SERPL-SCNC: 4.4 MMOL/L (ref 3.5–5.2)
PROT SERPL-MCNC: 7.9 G/DL (ref 6–8.5)
RBC # BLD AUTO: 4.46 10*6/MM3 (ref 3.77–5.28)
RETICS # AUTO: 0.07 10*6/MM3 (ref 0.02–0.13)
RETICS/RBC NFR AUTO: 1.5 % (ref 0.7–1.9)
SODIUM SERPL-SCNC: 136 MMOL/L (ref 136–145)
TIBC SERPL-MCNC: 408 MCG/DL (ref 298–536)
TRANSFERRIN SERPL-MCNC: 274 MG/DL (ref 200–360)
TRIGL SERPL-MCNC: 87 MG/DL (ref 0–150)
TSH SERPL DL<=0.05 MIU/L-ACNC: 2.53 UIU/ML (ref 0.27–4.2)
VIT B12 BLD-MCNC: 297 PG/ML (ref 211–946)
VLDLC SERPL-MCNC: 16 MG/DL (ref 5–40)
WBC NRBC COR # BLD: 7.11 10*3/MM3 (ref 3.4–10.8)

## 2023-11-01 PROCEDURE — 82728 ASSAY OF FERRITIN: CPT | Performed by: STUDENT IN AN ORGANIZED HEALTH CARE EDUCATION/TRAINING PROGRAM

## 2023-11-01 PROCEDURE — 80053 COMPREHEN METABOLIC PANEL: CPT | Performed by: STUDENT IN AN ORGANIZED HEALTH CARE EDUCATION/TRAINING PROGRAM

## 2023-11-01 PROCEDURE — 85045 AUTOMATED RETICULOCYTE COUNT: CPT | Performed by: STUDENT IN AN ORGANIZED HEALTH CARE EDUCATION/TRAINING PROGRAM

## 2023-11-01 PROCEDURE — 83036 HEMOGLOBIN GLYCOSYLATED A1C: CPT | Performed by: STUDENT IN AN ORGANIZED HEALTH CARE EDUCATION/TRAINING PROGRAM

## 2023-11-01 PROCEDURE — 83540 ASSAY OF IRON: CPT | Performed by: STUDENT IN AN ORGANIZED HEALTH CARE EDUCATION/TRAINING PROGRAM

## 2023-11-01 PROCEDURE — 85025 COMPLETE CBC W/AUTO DIFF WBC: CPT | Performed by: STUDENT IN AN ORGANIZED HEALTH CARE EDUCATION/TRAINING PROGRAM

## 2023-11-01 PROCEDURE — 84443 ASSAY THYROID STIM HORMONE: CPT | Performed by: STUDENT IN AN ORGANIZED HEALTH CARE EDUCATION/TRAINING PROGRAM

## 2023-11-01 PROCEDURE — 82607 VITAMIN B-12: CPT | Performed by: STUDENT IN AN ORGANIZED HEALTH CARE EDUCATION/TRAINING PROGRAM

## 2023-11-01 PROCEDURE — 84466 ASSAY OF TRANSFERRIN: CPT | Performed by: STUDENT IN AN ORGANIZED HEALTH CARE EDUCATION/TRAINING PROGRAM

## 2023-11-01 PROCEDURE — 80061 LIPID PANEL: CPT | Performed by: STUDENT IN AN ORGANIZED HEALTH CARE EDUCATION/TRAINING PROGRAM

## 2023-11-01 NOTE — PROGRESS NOTES
"Chief Complaint  Establish Care (Establish care), Depression (Going to [place] and has appointment with Psychiatry in December. ), Med Management (Patient also wants to discuss if she would become pregnant which meds she would need to stop. Patient not planning on getting pregnant is taking Birth Control pills), and Migraine (Takes Inderal for this. )    Subjective      History of Present Illness  Karine Swanson is a 33 y.o. female who presents to [facility] with a past medical history of postpartum depression, prediabetes, obesity, patient comes for a follow up of her comorbidities. She recently was positive for [id number] vaginalis and metronidazole was prescribed.     Previous work up and hx was reviewed.     Patient with obesity and prediabetes status. Will repeat blood work now and follow up. Patient might benefit from bariatric surgery. Eval possibility in her next visit.       Patient with moderate anemia that requires anemia work up. Reticulocytes, peripheral smear and iron panel ordered to be eval in the next visit.     Elevated alk phosp: get GGT and US liver.         Objective   Vital Signs:   Vitals:    11/01/23 0736   BP: 118/70   Pulse: 71   Temp: 97.4 °F (36.3 °C)   SpO2: 99%   Weight: 114 kg (250 lb 9.6 oz)   Height: 160 cm (63\")       Wt Readings from Last 3 Encounters:   11/01/23 114 kg (250 lb 9.6 oz)   10/24/23 112 kg (248 lb)   06/21/23 119 kg (263 lb 6.4 oz)     BP Readings from Last 3 Encounters:   11/01/23 118/70   10/24/23 110/77   06/21/23 131/84       Health Maintenance   Topic Date Due    COVID-19 Vaccine (1) Never done    TDAP/TD VACCINES (1 - Tdap) Never done    ANNUAL PHYSICAL  Never done    PAP SMEAR  Never done    INFLUENZA VACCINE  08/01/2023    BMI FOLLOWUP  11/01/2024    HEPATITIS C SCREENING  Completed    Pneumococcal Vaccine 0-64  Aged Out       Physical Exam  Vitals reviewed.   Constitutional:       Comments: Pale skin   HENT:      Head: " Normocephalic.      Mouth/Throat:      Mouth: Mucous membranes are moist.   Eyes:      Pupils: Pupils are equal, round, and reactive to light.   Cardiovascular:      Rate and Rhythm: Normal rate.   Abdominal:      General: Abdomen is flat.   Musculoskeletal:         General: Normal range of motion.      Cervical back: Normal range of motion.   Skin:     General: Skin is warm.      Capillary Refill: Capillary refill takes less than 2 seconds.   Neurological:      Mental Status: She is alert.            Result Review :  The following data was reviewed by: Anand Swenson MD on 11/01/2023:           Assessment and Plan   Diagnoses and all orders for this visit:    1. Anxiety and depression (Primary)  -     Ambulatory Referral to Chronic Care Management    2. Medication management    3. Anemia, unspecified type  -     Iron Profile; Future  -     Comprehensive Metabolic Panel; Future  -     CBC & Differential; Future  -     TSH; Future  -     Vitamin B12; Future  -     Ferritin; Future  -     Reticulocytes; Future  -     Peripheral Blood Smear; Future  -     Iron Profile  -     Comprehensive Metabolic Panel  -     CBC & Differential  -     TSH  -     Vitamin B12  -     Ferritin  -     Reticulocytes  -     Peripheral Blood Smear  -     Ambulatory Referral to Chronic Care Management    4. Prediabetes  -     Hemoglobin A1c; Future  -     Hemoglobin A1c  -     Ambulatory Referral to Chronic Care Management    5. Lipid screening  -     Lipid Panel; Future  -     Lipid Panel    6. Morbid (severe) obesity due to excess calories  -     MyChart Exercise Flowsheet  -     Ambulatory Referral to Chronic Care Management    7. Obesity, morbid  -     Ambulatory Referral to Chronic Care Management    8. Elevated alkaline phosphatase level  -     Gamma GT; Future  -     US Liver; Future        Class 3 Severe Obesity (BMI >=40). Obesity-related health conditions include the following: none. Obesity is unchanged. BMI is  increased. We discussed low calorie, low carb based diet program, portion control, and increasing exercise.         FOLLOW UP  Return in about 3 weeks (around 11/22/2023).  Patient was given instructions and counseling regarding her condition or for health maintenance advice. Please see specific information pulled into the AVS if appropriate.       Anand Swenson MD  11/01/23  08:54 EDT    CURRENT & DISCONTINUED MEDICATIONS  Current Outpatient Medications   Medication Instructions    buPROPion XL (WELLBUTRIN XL) 300 mg, Oral, Daily    FLUoxetine (PROZAC) 40 mg, Oral, Daily    LORazepam (ATIVAN) 1 mg, Oral, Every Night at Bedtime    metroNIDAZOLE (FLAGYL) 500 mg, Oral, 2 Times Daily    norgestimate-ethinyl estradiol (ORTHO-CYCLEN) 0.25-35 MG-MCG per tablet 1 tablet, Oral, Daily    propranolol (INDERAL) 20 MG tablet TAKE ONE TABLET BY MOUTH TWICE DAILY (hold IF heart rate is less THAN 60 beats PER minute)    traZODone (DESYREL)  mg, Oral, Nightly PRN, for sleep       Medications Discontinued During This Encounter   Medication Reason    hydrOXYzine (ATARAX) 25 MG tablet *Therapy completed    traZODone (DESYREL) 50 MG tablet *Therapy completed

## 2023-11-01 NOTE — PATIENT INSTRUCTIONS
Obesity, Adult  Obesity is the condition of having too much total body fat. Being overweight or obese means that your weight is greater than what is considered healthy for your body size. Obesity is determined by a measurement called BMI (body mass index). BMI is an estimate of body fat and is calculated from height and weight. For adults, a BMI of 30 or higher is considered obese.  Obesity can lead to other health concerns and major illnesses, including:  Stroke.  Coronary artery disease (CAD).  Type 2 diabetes.  Some types of cancer, including cancers of the colon, breast, uterus, and gallbladder.  High blood pressure (hypertension).  High cholesterol.  Gallbladder stones.  Obesity can also contribute to:  Osteoarthritis.  Sleep apnea.  Infertility problems.  What are the causes?  Common causes of this condition include:  Eating daily meals that are high in calories, sugar, and fat.  Drinking high amounts of sugar-sweetened beverages, such as soft drinks.  Being born with genes that may make you more likely to become obese.  Having a medical condition that causes obesity, including:  Hypothyroidism.  Polycystic ovarian syndrome (PCOS).  Binge-eating disorder.  Cushing syndrome.  Taking certain medicines, such as steroids, antidepressants, and seizure medicines.  Not being physically active (sedentary lifestyle).  Not getting enough sleep.  What increases the risk?  The following factors may make you more likely to develop this condition:  Having a family history of obesity.  Living in an area with limited access to:  [place], recreation centers, or sidewalks.  Healthy food choices, such as grocery stores and [employer] markets.  What are the signs or symptoms?  The main sign of this condition is having too much body fat.  How is this diagnosed?  This condition is diagnosed based on:  Your BMI. If you are an adult with a BMI of 30 or higher, you are considered obese.  Your waist circumference. This measures the  distance around your waistline.  Your skinfold thickness. Your health care provider may gently pinch a fold of your skin and measure it.  You may have other tests to check for underlying conditions.  How is this treated?  Treatment for this condition often includes changing your lifestyle. Treatment may include some or all of the following:  Dietary changes. This may include developing a healthy meal plan.  Regular physical activity. This may include activity that causes your heart to beat faster (aerobic exercise) and strength training. Work with your health care provider to design an exercise program that works for you.  Medicine to help you lose weight if you are unable to lose one pound a week after six weeks of healthy eating and more physical activity.  Treating conditions that cause the obesity (underlying conditions).  Surgery. Surgical options may include gastric banding and gastric bypass. Surgery may be done if:  Other treatments have not helped to improve your condition.  You have a BMI of 40 or higher.  You have life-threatening health problems related to obesity.  Follow these instructions at home:  Eating and drinking    Follow recommendations from your health care provider about what you eat and drink. Your health care provider may advise you to:  Limit fast food, sweets, and processed snack foods.  Choose low-fat options, such as low-fat milk instead of whole milk.  Eat five or more servings of fruits or vegetables every day.  Choose healthy foods when you eat out.  Keep low-fat snacks available.  Limit sugary drinks, such as soda, fruit juice, sweetened iced tea, and flavored milk.  Drink enough water to keep your urine pale yellow.  Do not follow a fad diet. Fad diets can be unhealthy and even dangerous.  Other healthful choices include:  Eat at home more often. This gives you more control over what you eat.  Learn to read food labels. This will help you understand how much food is considered one  serving.  Learn what a healthy serving size is.  Physical activity  Exercise regularly, as told by your health care provider.  Most adults should get up to 150 minutes of moderate-intensity exercise every week.  Ask your health care provider what types of exercise are safe for you and how often you should exercise.  Warm up and stretch before being active.  Cool down and stretch after being active.  Rest between periods of activity.  Lifestyle  Work with your health care provider and a dietitian to set a weight-loss goal that is healthy and reasonable for you.  Limit your screen time.  Find ways to reward yourself that do not involve food.  Do not drink alcohol if:  Your health care provider tells you not to drink.  You are pregnant, may be pregnant, or are planning to become pregnant.  If you drink alcohol:  Limit how much you have to:  0-1 drink a day for women.  0-2 drinks a day for men.  Know how much alcohol is in your drink. In the [place] one drink equals one 12 oz bottle of beer (355 mL), one 5 oz glass of wine (148 mL), or one 1½ oz glass of hard liquor (44 mL).  General instructions  Keep a weight-loss journal to keep track of the food you eat and how much exercise you get.  Take over-the-counter and prescription medicines only as told by your health care provider.  Take vitamins and supplements only as told by your health care provider.  Consider joining a support group. Your health care provider may be able to recommend a support group.  Pay attention to your mental health as obesity can lead to depression or self esteem issues.  Keep all follow-up visits. This is important.  Contact a health care provider if:  You are unable to meet your weight-loss goal after six weeks of dietary and lifestyle changes.  You have trouble breathing.  Summary  Obesity is the condition of having too much total body fat.  Being overweight or obese means that your weight is greater than what is considered healthy for your body  size.  Work with your health care provider and a dietitian to set a weight-loss goal that is healthy and reasonable for you.  Exercise regularly, as told by your health care provider. Ask your health care provider what types of exercise are safe for you and how often you should exercise.  This information is not intended to replace advice given to you by your health care provider. Make sure you discuss any questions you have with your health care provider.  Document Revised: 07/26/2022 Document Reviewed: 07/26/2022  [employer] Patient Education © 2023 [employer]    Exercising to Lose Weight  Getting regular exercise is important for everyone. It is especially important if you are overweight. Being overweight increases your risk of heart disease, stroke, diabetes, high blood pressure, and several types of cancer. Exercising, and reducing the calories you consume, can help you lose weight and improve fitness and health.  Exercise can be moderate or vigorous intensity. To lose weight, most people need to do a certain amount of moderate or vigorous-intensity exercise each week.  How can exercise affect me?  You lose weight when you exercise enough to burn more calories than you eat. Exercise also reduces body fat and builds muscle. The more muscle you have, the more calories you burn. Exercise also:  Improves mood.  Reduces stress and tension.  Improves your overall fitness, flexibility, and endurance.  Increases bone strength.  Moderate-intensity exercise    Moderate-intensity exercise is any activity that gets you moving enough to burn at least three times more energy (calories) than if you were sitting.  Examples of moderate exercise include:  Walking a mile in 15 minutes.  Doing light yard work.  Biking at an easy pace.  Most people should get at least 150 minutes of moderate-intensity exercise a week to maintain their body weight.  Vigorous-intensity exercise  Vigorous-intensity exercise is any activity that gets  you moving enough to burn at least six times more calories than if you were sitting. When you exercise at this intensity, you should be working hard enough that you are not able to carry on a conversation.  Examples of vigorous exercise include:  Running.  Playing a team sport, such as football, basketball, and soccer.  Jumping rope.  Most people should get at least 75 minutes a week of vigorous exercise to maintain their body weight.  What actions can I take to lose weight?  The amount of exercise you need to lose weight depends on:  Your age.  The type of exercise.  Any health conditions you have.  Your overall physical ability.  Talk to your health care provider about how much exercise you need and what types of activities are safe for you.  Nutrition    Make changes to your diet as told by your health care provider or diet and nutrition specialist (dietitian). This may include:  Eating fewer calories.  Eating more protein.  Eating less unhealthy fats.  Eating a diet that includes fresh fruits and vegetables, whole grains, low-fat dairy products, and lean protein.  Avoiding foods with added fat, salt, and sugar.  Drink plenty of water while you exercise to prevent dehydration or heat stroke.  Activity  Choose an activity that you enjoy and set realistic goals. Your health care provider can help you make an exercise plan that works for you.  Exercise at a moderate or vigorous intensity most days of the week.  The intensity of exercise may vary from person to person. You can tell how intense a workout is for you by paying attention to your breathing and heartbeat. Most people will notice their breathing and heartbeat get faster with more intense exercise.  Do resistance training twice each week, such as:  Push-ups.  Sit-ups.  Lifting weights.  Using resistance bands.  Getting short amounts of exercise can be just as helpful as long, structured periods of exercise. If you have trouble finding time to exercise, try  doing these things as part of your daily routine:  Get up, stretch, and walk around every 30 minutes throughout the day.  Go for a walk during your lunch break.  Park your car farther away from your destination.  If you take public transportation, get off one stop early and walk the rest of the way.  Make phone calls while standing up and walking around.  Take the stairs instead of elevators or escalators.  Wear comfortable clothes and shoes with good support.  Do not exercise so much that you hurt yourself, feel dizzy, or get very short of breath.  Where to find more information  [place] Department of Health and Human Services: www.hhs.gov  Centers for Disease Control and Prevention: www.cdc.gov  Contact a health care provider:  Before starting a new exercise program.  If you have questions or concerns about your weight.  If you have a medical problem that keeps you from exercising.  Get help right away if:  You have any of the following while exercising:  Injury.  Dizziness.  Difficulty breathing or shortness of breath that does not go away when you stop exercising.  Chest pain.  Rapid heartbeat.  These symptoms may represent a serious problem that is an emergency. Do not wait to see if the symptoms will go away. Get medical help right away. Call your local emergency services (911 in the [place] Do not drive yourself to the hospital.  Summary  Getting regular exercise is especially important if you are overweight.  Being overweight increases your risk of heart disease, stroke, diabetes, high blood pressure, and several types of cancer.  Losing weight happens when you burn more calories than you eat.  Reducing the amount of calories you eat, and getting regular moderate or vigorous exercise each week, helps you lose weight.  This information is not intended to replace advice given to you by your health care provider. Make sure you discuss any questions you have with your health care provider.  Document Revised:  02/13/2022 Document Reviewed: 02/13/2022  [employer] Patient Education © 2023 [employer]    MyPlate from [employer]    MyPlate is an outline of a general healthy diet based on the Dietary Guidelines for Americans, 2379-6480, from the [place] Department of Agriculture (USDA). It sets guidelines for how much food you should eat from each food group based on your age, sex, and level of physical activity.  What are tips for following MyPlate?  To follow MyPlate recommendations:  Eat a wide variety of fruits and vegetables, grains, and protein foods.  Serve smaller portions and eat less food throughout the day.  Limit portion sizes to avoid overeating.  Enjoy your food.  Get at least 150 minutes of exercise every week. This is about 30 minutes each day, 5 or more days per week.  It can be difficult to have every meal look like MyPlate. Think about MyPlate as eating guidelines for an entire day, rather than each individual meal.  Fruits and vegetables  Make one half of your plate fruits and vegetables.  Eat many different colors of fruits and vegetables each day.  For a 2,000-calorie daily food plan, eat:  2½ cups of vegetables every day.  2 cups of fruit every day.  1 cup is equal to:  1 cup raw or cooked vegetables.  1 cup raw fruit.  1 medium-sized orange, apple, or banana.  1 cup 100% fruit or vegetable juice.  2 cups raw leafy greens, such as lettuce, spinach, or kale.  ½ cup dried fruit.  Grains  One fourth of your plate should be grains.  Make at least half of the grains you eat each day whole grains.  For a 2,000-calorie daily food plan, eat 6 oz of grains every day.  1 oz is equal to:  1 slice bread.  1 cup cereal.  ½ cup cooked rice, cereal, or pasta.  Protein  One fourth of your plate should be protein.  Eat a wide variety of protein foods, including meat, poultry, fish, eggs, beans, nuts, and tofu.  For a 2,000-calorie daily food plan, eat 5½ oz of protein every day.  1 oz is equal to:  1 oz meat, poultry, or fish.  ¼  cup cooked beans.  1 egg.  ½ oz nuts or seeds.  1 Tbsp peanut butter.  Dairy  Drink fat-free or low-fat (1%) milk.  Eat or drink dairy as a side to meals.  For a 2,000-calorie daily food plan, eat or drink 3 cups of dairy every day.  1 cup is equal to:  1 cup milk, yogurt, cottage cheese, or soy milk (soy beverage).  2 oz processed cheese.  1½ oz natural cheese.  Fats, oils, salt, and sugars  Only small amounts of oils are recommended.  Avoid foods that are high in calories and low in nutritional value (empty calories), like foods high in fat or added sugars.  Choose foods that are low in salt (sodium). Choose foods that have less than 140 milligrams (mg) of sodium per serving.  Drink water instead of sugary drinks. Drink enough fluid to keep your urine pale yellow.  Where to find support  Work with your health care provider or a dietitian to develop a customized eating plan that is right for you.  Download an sid (mobile application) to help you track your daily food intake.  Where to find more information  USDA: ChooseMyPlate.gov  Summary  MyPlate is a general guideline for healthy eating from the USDA. It is based on the Dietary Guidelines for Americans, 7337-8736.  In general, fruits and vegetables should take up one half of your plate, grains should take up one fourth of your plate, and protein should take up one fourth of your plate.  This information is not intended to replace advice given to you by your health care provider. Make sure you discuss any questions you have with your health care provider.  Document Revised: 11/08/2021 Document Reviewed: 11/08/2021  [employer] Patient Education © 2023 [employer]    Calorie Counting for Weight Loss  Calories are units of energy. Your body needs a certain number of calories from food to keep going throughout the day. When you eat or drink more calories than your body needs, your body stores the extra calories mostly as fat. When you eat or drink fewer calories than  your body needs, your body burns fat to get the energy it needs.  Calorie counting means keeping track of how many calories you eat and drink each day. Calorie counting can be helpful if you need to lose weight. If you eat fewer calories than your body needs, you should lose weight. Ask your health care provider what a healthy weight is for you.  For calorie counting to work, you will need to eat the right number of calories each day to lose a healthy amount of weight per week. A dietitian can help you figure out how many calories you need in a day and will suggest ways to reach your calorie goal.  A healthy amount of weight to lose each week is usually 1-2 lb (0.5-0.9 kg). This usually means that your daily calorie intake should be reduced by 500-750 calories.  Eating 1,200-1,500 calories a day can help most women lose weight.  Eating 1,500-1,800 calories a day can help most men lose weight.  What do I need to know about calorie counting?  Work with your health care provider or dietitian to determine how many calories you should get each day. To meet your daily calorie goal, you will need to:  Find out how many calories are in each food that you would like to eat. Try to do this before you eat.  Decide how much of the food you plan to eat.  Keep a food log. Do this by writing down what you ate and how many calories it had.  To successfully lose weight, it is important to balance calorie counting with a healthy lifestyle that includes regular activity.  Where do I find calorie information?    The number of calories in a food can be found on a Nutrition Facts label. If a food does not have a Nutrition Facts label, try to look up the calories online or ask your dietitian for help.  Remember that calories are listed per serving. If you choose to have more than one serving of a food, you will have to multiply the calories per serving by the number of servings you plan to eat. For example, the label on a package of bread  might say that a serving size is 1 slice and that there are 90 calories in a serving. If you eat 1 slice, you will have eaten 90 calories. If you eat 2 slices, you will have eaten 180 calories.  How do I keep a food log?  After each time that you eat, record the following in your food log as soon as possible:  What you ate. Be sure to include toppings, sauces, and other extras on the food.  How much you ate. This can be measured in cups, ounces, or number of items.  How many calories were in each food and drink.  The total number of calories in the food you ate.  Keep your food log near you, such as in a pocket-sized notebook or on an sid or website on your mobile phone. Some programs will calculate calories for you and show you how many calories you have left to meet your daily goal.  What are some portion-control tips?  Know how many calories are in a serving. This will help you know how many servings you can have of a certain food.  Use a measuring cup to measure serving sizes. You could also try weighing out portions on a kitchen scale. With time, you will be able to estimate serving sizes for some foods.  Take time to put servings of different foods on your favorite plates or in your favorite bowls and cups so you know what a serving looks like.  Try not to eat straight from a food's packaging, such as from a bag or box. Eating straight from the package makes it hard to see how much you are eating and can lead to overeating. Put the amount you would like to eat in a cup or on a plate to make sure you are eating the right portion.  Use smaller plates, glasses, and bowls for smaller portions and to prevent overeating.  Try not to multitask. For example, avoid watching TV or using your computer while eating. If it is time to eat, sit down at a table and enjoy your food. This will help you recognize when you are full. It will also help you be more mindful of what and how much you are eating.  What are tips for  following this plan?  Reading food labels  Check the calorie count compared with the serving size. The serving size may be smaller than what you are used to eating.  Check the source of the calories. Try to choose foods that are high in protein, fiber, and vitamins, and low in saturated fat, trans fat, and sodium.  Shopping  Read nutrition labels while you shop. This will help you make healthy decisions about which foods to buy.  Pay attention to nutrition labels for low-fat or fat-free foods. These foods sometimes have the same number of calories or more calories than the full-fat versions. They also often have added sugar, starch, or salt to make up for flavor that was removed with the fat.  Make a grocery list of lower-calorie foods and stick to it.  Cooking  Try to cook your favorite foods in a healthier way. For example, try baking instead of frying.  Use low-fat dairy products.  Meal planning  Use more fruits and vegetables. One-half of your plate should be fruits and vegetables.  Include lean proteins, such as chicken, turkey, and fish.  Lifestyle  Each week, aim to do one of the followin minutes of moderate exercise, such as walking.  75 minutes of vigorous exercise, such as running.  General information  Know how many calories are in the foods you eat most often. This will help you calculate calorie counts faster.  Find a way of tracking calories that works for you. Get creative. Try different apps or programs if writing down calories does not work for you.  What foods should I eat?    Eat nutritious foods. It is better to have a nutritious, high-calorie food, such as an avocado, than a food with few nutrients, such as a bag of potato chips.  Use your calories on foods and drinks that will fill you up and will not leave you hungry soon after eating.  Examples of foods that fill you up are nuts and nut butters, vegetables, lean proteins, and high-fiber foods such as whole grains. High-fiber foods are  foods with more than 5 g of fiber per serving.  Pay attention to calories in drinks. Low-calorie drinks include water and unsweetened drinks.  The items listed above may not be a complete list of foods and beverages you can eat. Contact a dietitian for more information.  What foods should I limit?  Limit foods or drinks that are not good sources of vitamins, minerals, or protein or that are high in unhealthy fats. These include:  Candy.  Other sweets.  Sodas, specialty coffee drinks, alcohol, and juice.  The items listed above may not be a complete list of foods and beverages you should avoid. Contact a dietitian for more information.  How do I count calories when eating out?  Pay attention to portions. Often, portions are much larger when eating out. Try these tips to keep portions smaller:  Consider sharing a meal instead of getting your own.  If you get your own meal, eat only half of it. Before you start eating, ask for a container and put half of your meal into it.  When available, consider ordering smaller portions from the menu instead of full portions.  Pay attention to your food and drink choices. Knowing the way food is cooked and what is included with the meal can help you eat fewer calories.  If calories are listed on the menu, choose the lower-calorie options.  Choose dishes that include vegetables, fruits, whole grains, low-fat dairy products, and lean proteins.  Choose items that are boiled, broiled, grilled, or steamed. Avoid items that are buttered, battered, fried, or served with cream sauce. Items labeled as crispy are usually fried, unless stated otherwise.  Choose water, low-fat milk, unsweetened iced tea, or other drinks without added sugar. If you want an alcoholic beverage, choose a lower-calorie option, such as a glass of wine or light beer.  Ask for dressings, sauces, and syrups on the side. These are usually high in calories, so you should limit the amount you eat.  If you want a salad,  choose a garden salad and ask for grilled meats. Avoid extra toppings such as abernathy, cheese, or fried items. Ask for the dressing on the side, or ask for olive oil and vinegar or lemon to use as dressing.  Estimate how many servings of a food you are given. Knowing serving sizes will help you be aware of how much food you are eating at restaurants.  Where to find more information  Centers for Disease Control and Prevention: www.cdc.gov  [place] Department of Agriculture: myplate.gov  Summary  Calorie counting means keeping track of how many calories you eat and drink each day. If you eat fewer calories than your body needs, you should lose weight.  A healthy amount of weight to lose per week is usually 1-2 lb (0.5-0.9 kg). This usually means reducing your daily calorie intake by 500-750 calories.  The number of calories in a food can be found on a Nutrition Facts label. If a food does not have a Nutrition Facts label, try to look up the calories online or ask your dietitian for help.  Use smaller plates, glasses, and bowls for smaller portions and to prevent overeating.  Use your calories on foods and drinks that will fill you up and not leave you hungry shortly after a meal.  This information is not intended to replace advice given to you by your health care provider. Make sure you discuss any questions you have with your health care provider.  Document Revised: 01/28/2021 Document Reviewed: 01/28/2021  Elsevier Patient Education © 2023 [employer]

## 2023-11-02 LAB
LAB AP CASE REPORT: NORMAL
PATH REPORT.FINAL DX SPEC: NORMAL

## 2023-11-09 ENCOUNTER — TELEPHONE (OUTPATIENT)
Dept: CASE MANAGEMENT | Facility: OTHER | Age: 33
End: 2023-11-09

## 2023-11-10 ENCOUNTER — TELEPHONE (OUTPATIENT)
Dept: CASE MANAGEMENT | Facility: OTHER | Age: 33
End: 2023-11-10

## 2023-11-13 ENCOUNTER — TELEPHONE (OUTPATIENT)
Dept: CASE MANAGEMENT | Facility: OTHER | Age: 33
End: 2023-11-13

## 2023-11-16 ENCOUNTER — TELEPHONE (OUTPATIENT)
Dept: FAMILY MEDICINE CLINIC | Facility: CLINIC | Age: 33
End: 2023-11-16

## 2023-11-16 ENCOUNTER — OFFICE VISIT (OUTPATIENT)
Dept: FAMILY MEDICINE CLINIC | Facility: CLINIC | Age: 33
End: 2023-11-16
Payer: MEDICAID

## 2023-11-16 VITALS
OXYGEN SATURATION: 100 % | HEART RATE: 71 BPM | SYSTOLIC BLOOD PRESSURE: 120 MMHG | TEMPERATURE: 98.4 F | BODY MASS INDEX: 43.94 KG/M2 | HEIGHT: 63 IN | DIASTOLIC BLOOD PRESSURE: 70 MMHG | WEIGHT: 248 LBS

## 2023-11-16 DIAGNOSIS — M54.9 CHRONIC BILATERAL BACK PAIN, UNSPECIFIED BACK LOCATION: ICD-10-CM

## 2023-11-16 DIAGNOSIS — G89.29 CHRONIC BACK PAIN, UNSPECIFIED BACK LOCATION, UNSPECIFIED BACK PAIN LATERALITY: Primary | ICD-10-CM

## 2023-11-16 DIAGNOSIS — R73.03 PREDIABETES: ICD-10-CM

## 2023-11-16 DIAGNOSIS — G89.29 CHRONIC BILATERAL BACK PAIN, UNSPECIFIED BACK LOCATION: ICD-10-CM

## 2023-11-16 DIAGNOSIS — M54.9 CHRONIC BACK PAIN, UNSPECIFIED BACK LOCATION, UNSPECIFIED BACK PAIN LATERALITY: Primary | ICD-10-CM

## 2023-11-16 DIAGNOSIS — R32 URINARY INCONTINENCE IN FEMALE: ICD-10-CM

## 2023-11-16 DIAGNOSIS — E66.01 MORBID (SEVERE) OBESITY DUE TO EXCESS CALORIES: ICD-10-CM

## 2023-11-16 DIAGNOSIS — E53.8 VITAMIN B12 DEFICIENCY: ICD-10-CM

## 2023-11-16 RX ORDER — CYANOCOBALAMIN 1000 UG/ML
1000 INJECTION, SOLUTION INTRAMUSCULAR; SUBCUTANEOUS
Status: SHIPPED | OUTPATIENT
Start: 2023-11-16

## 2023-11-16 RX ORDER — METFORMIN HYDROCHLORIDE 500 MG/1
500 TABLET, EXTENDED RELEASE ORAL
Qty: 30 TABLET | Refills: 2 | Status: SHIPPED | OUTPATIENT
Start: 2023-11-16 | End: 2024-02-14

## 2023-11-16 RX ADMIN — CYANOCOBALAMIN 1000 MCG: 1000 INJECTION, SOLUTION INTRAMUSCULAR; SUBCUTANEOUS at 10:50

## 2023-11-16 NOTE — PROGRESS NOTES
Injection  Injection performed in right arm by Crystal Avalos. Patient tolerated the procedure well without complications.  11/16/23   Crystal Avalos

## 2023-11-16 NOTE — PROGRESS NOTES
Chief Complaint  Insomnia (Problems sleeping. States takes awhile and then she can fall asleep but wakes frequently at night), Headache (Increased headaches since not sleeping.  No nausea, no sensitivity to light. ), Difficulty Urinating (Has had problems since 03/2022 after giving birth to twins.  But not getting any better.  Urinary urgency, inability to control urine. Occasionally very slow stream but if patient leans forward while on commode then urine will come out faster. No dysuria, no temp.  Patient states she always wears a pad to catch urine. ), and Vaginal Bleeding (States period is all messed up.  Has had bleeding off and on for a couple months and comes irregular.  Patient stopped taking her birth control a couple weeks ago.  States has been heavier since stopping the birth control)    Subjective      Insomnia  Associated symptoms include headaches.   Headache  Difficulty Urinating  Associated symptoms include headaches.   Vaginal Bleeding  Associated symptoms include headaches.     Karine Swanson is a 33 y.o. female who presents to [facility] with a past medical history of  No past medical history on file.    Patient comes today for labs review.       Anemia resolve comparing with previous blood work.   Prediabetes status. A1c okay   Borderline B12. Will start B12 supplementation as we will start metformin of label for weight loss.       Patient will elevated BMI of 43.93 severe obesity. She might benefit of weight loss. Will start metformin off label and reeval. Will try to obtain semaglutide for weight loss if possible.     She is complaining of chronic back pain. Straight leg positive bilateral. Weight loss is important for control of her symptoms. She might benefit from Physical therapy. Will obtain an XR lumbar spine.     She is having stress incontinence after twins were born. She might benefit from kegel exercise information given. She also might benefit from  "gynecology evaluation.     Objective   Vital Signs:   Vitals:    11/16/23 1005   BP: 120/70   Pulse: 71   Temp: 98.4 °F (36.9 °C)   SpO2: 100%   Weight: 112 kg (248 lb)   Height: 160 cm (63\")     Body mass index is 43.93 kg/m².    Wt Readings from Last 3 Encounters:   11/16/23 112 kg (248 lb)   11/01/23 114 kg (250 lb 9.6 oz)   10/24/23 112 kg (248 lb)     BP Readings from Last 3 Encounters:   11/16/23 120/70   11/01/23 118/70   10/24/23 110/77       Health Maintenance   Topic Date Due    COVID-19 Vaccine (1) Never done    TDAP/TD VACCINES (1 - Tdap) Never done    ANNUAL PHYSICAL  Never done    PAP SMEAR  Never done    INFLUENZA VACCINE  08/01/2023    BMI FOLLOWUP  11/01/2024    HEPATITIS C SCREENING  Completed    Pneumococcal Vaccine 0-64  Aged Out       Physical Exam  Vitals reviewed.   Constitutional:       Comments: Straight leg positive bilateral    HENT:      Head: Normocephalic.      Mouth/Throat:      Mouth: Mucous membranes are moist.   Eyes:      Pupils: Pupils are equal, round, and reactive to light.   Cardiovascular:      Rate and Rhythm: Normal rate.   Abdominal:      General: Abdomen is flat.   Musculoskeletal:         General: Normal range of motion.      Cervical back: Normal range of motion.   Skin:     General: Skin is warm.      Capillary Refill: Capillary refill takes less than 2 seconds.   Neurological:      Mental Status: She is alert.            Result Review :  The following data was reviewed by: Anand Swenson MD on 11/16/2023:             Assessment and Plan   Diagnoses and all orders for this visit:    1. Chronic back pain, unspecified back location, unspecified back pain laterality (Primary)  -     XR Spine Lumbar 2 or 3 View; Future    2. Urinary incontinence in female  -     Ambulatory Referral to Gynecology    3. Chronic bilateral back pain, unspecified back location  -     Ambulatory Referral to Physical Therapy    4. Postpartum depression    5. Morbid (severe) obesity " due to excess calories  -     Semaglutide-Weight Management 0.25 MG/0.5ML solution auto-injector; Inject 0.25 mg under the skin into the appropriate area as directed 1 (One) Time Per Week.  Dispense: 2 mL; Refill: 0  -     metFORMIN ER (GLUCOPHAGE-XR) 500 MG 24 hr tablet; Take 1 tablet by mouth Daily With Breakfast for 90 days.  Dispense: 30 tablet; Refill: 2  -     Ambulatory Referral to Nutrition Services    6. Vitamin B12 deficiency  -     cyanocobalamin injection 1,000 mcg                  FOLLOW UP  No follow-ups on file.  Patient was given instructions and counseling regarding her condition or for health maintenance advice. Please see specific information pulled into the AVS if appropriate.       Anand Swenson MD  11/16/23  10:48 EST    CURRENT & DISCONTINUED MEDICATIONS  Current Outpatient Medications   Medication Instructions    buPROPion XL (WELLBUTRIN XL) 300 mg, Oral, Daily    FLUoxetine (PROZAC) 40 mg, Oral, Daily    LORazepam (ATIVAN) 1 mg, Oral, Every Night at Bedtime    metFORMIN ER (GLUCOPHAGE-XR) 500 mg, Oral, Daily With Breakfast    norgestimate-ethinyl estradiol (ORTHO-CYCLEN) 0.25-35 MG-MCG per tablet 1 tablet, Oral, Daily    propranolol (INDERAL) 20 MG tablet TAKE ONE TABLET BY MOUTH TWICE DAILY (hold IF heart rate is less THAN 60 beats PER minute)    Semaglutide-Weight Management 0.25 mg, Subcutaneous, Weekly    traZODone (DESYREL)  mg, Oral, Nightly PRN, for sleep       There are no discontinued medications.

## 2023-11-16 NOTE — TELEPHONE ENCOUNTER
Pharmacy Name:  [facility] - [place], [place] - [address] - [phone]  - [phone] (Pharmacy)     Pharmacy representative name: THIAGO    Pharmacy representative phone number: [phone]      What medication are you calling in regards to: WEGOVY    What question does the pharmacy have: THIAGO STATED THAT THE PATIENT DOES NOT HAVE INSURANCE AND COST IS $1400 FOR A 4 WEEK SUPPLY. ARE THE TO PROCEED WITHOUT INSURANCE TO FILL.     Who is the provider that prescribed the medication:   MARIE FARRAR MD

## 2023-11-25 ENCOUNTER — HOSPITAL ENCOUNTER (OUTPATIENT)
Dept: ULTRASOUND IMAGING | Facility: HOSPITAL | Age: 33
Discharge: HOME | End: 2023-11-25
Admitting: STUDENT IN AN ORGANIZED HEALTH CARE EDUCATION/TRAINING PROGRAM

## 2023-11-25 DIAGNOSIS — R74.8 ELEVATED ALKALINE PHOSPHATASE LEVEL: ICD-10-CM

## 2023-11-25 PROCEDURE — 76705 ECHO EXAM OF ABDOMEN: CPT

## 2023-11-28 ENCOUNTER — OFFICE VISIT (OUTPATIENT)
Dept: FAMILY MEDICINE CLINIC | Facility: CLINIC | Age: 33
End: 2023-11-28
Payer: MEDICAID

## 2023-11-28 VITALS
HEART RATE: 73 BPM | WEIGHT: 252 LBS | SYSTOLIC BLOOD PRESSURE: 108 MMHG | HEIGHT: 63 IN | DIASTOLIC BLOOD PRESSURE: 75 MMHG | BODY MASS INDEX: 44.65 KG/M2 | OXYGEN SATURATION: 98 % | TEMPERATURE: 98.1 F

## 2023-11-28 DIAGNOSIS — Z12.4 PAP SMEAR FOR CERVICAL CANCER SCREENING: ICD-10-CM

## 2023-11-28 DIAGNOSIS — Z01.419 ENCOUNTER FOR WELL WOMAN EXAM WITH ROUTINE GYNECOLOGICAL EXAM: Primary | ICD-10-CM

## 2023-11-28 DIAGNOSIS — R10.2 PELVIC PAIN: ICD-10-CM

## 2023-11-28 DIAGNOSIS — N89.8 VAGINAL DISCHARGE: ICD-10-CM

## 2023-11-28 DIAGNOSIS — N64.4 BREAST PAIN IN FEMALE: ICD-10-CM

## 2023-11-28 DIAGNOSIS — N39.46 MIXED STRESS AND URGE INCONTINENCE: ICD-10-CM

## 2023-11-28 LAB
CANDIDA SPECIES: NEGATIVE
GARDNERELLA VAGINALIS: NEGATIVE
T VAGINALIS DNA VAG QL PROBE+SIG AMP: NEGATIVE

## 2023-11-28 PROCEDURE — 87510 GARDNER VAG DNA DIR PROBE: CPT | Performed by: NURSE PRACTITIONER

## 2023-11-28 PROCEDURE — 87660 TRICHOMONAS VAGIN DIR PROBE: CPT | Performed by: NURSE PRACTITIONER

## 2023-11-28 PROCEDURE — G0123 SCREEN CERV/VAG THIN LAYER: HCPCS | Performed by: NURSE PRACTITIONER

## 2023-11-28 PROCEDURE — 87624 HPV HI-RISK TYP POOLED RSLT: CPT | Performed by: NURSE PRACTITIONER

## 2023-11-28 PROCEDURE — 87480 CANDIDA DNA DIR PROBE: CPT | Performed by: NURSE PRACTITIONER

## 2023-11-28 RX ORDER — TOLTERODINE 2 MG/1
2 CAPSULE, EXTENDED RELEASE ORAL DAILY
Qty: 30 CAPSULE | Refills: 2 | Status: SHIPPED | OUTPATIENT
Start: 2023-11-28

## 2023-11-28 NOTE — PROGRESS NOTES
Female Physical / PAP Note      Patient Name: Karine Swanson  : 1990   MRN: [id number]     Chief Complaint:    Chief Complaint   Patient presents with    Gynecologic Exam       History of Present Illness: Karine Swanson is a 33 y.o. female who is here today for her annual health maintenance and physical.       Pap- 2022  Lmp- 23.  For the last 2 months she has been having a period every 2 weeks. She says it is heavier than is use to be.  Stopped BCP for a few months, resumed 4 weeks ago       Also c/o urinary frequency, urinary urgency with incontinence after having her twins   3.22.2023 twins were born     Pt c/o vaginal itching and discharge   Also c/o painful intercourse     Also c/o pain in both breasts for a few weeks, worse in right breast  No injury  Caffeine, limited to 2 cups of coffee per week     Subjective      Review of Systems:   Review of Systems   Constitutional:  Negative for fever.   Respiratory:  Negative for shortness of breath.    Cardiovascular:  Negative for chest pain.   Gastrointestinal:  Negative for abdominal pain, diarrhea, nausea and vomiting.   Genitourinary:  Positive for frequency and urgency. Negative for dysuria.   Skin:  Negative for rash.      Breast - No tenderness, lumps, discharge, or blood from nipples.      Past Medical History, Social History, Family History and Care Team were all reviewed with patient and updated as appropriate.     Medications:     Current Outpatient Medications:     buPROPion XL (Wellbutrin XL) 300 MG 24 hr tablet, Take 1 tablet by mouth Daily., Disp: 90 tablet, Rfl: 3    FLUoxetine (PROzac) 40 MG capsule, Take 1 capsule by mouth Daily., Disp: 90 capsule, Rfl: 3    LORazepam (ATIVAN) 1 MG tablet, Take 1 tablet by mouth every night at bedtime., Disp: , Rfl:     metFORMIN ER (GLUCOPHAGE-XR) 500 MG 24 hr tablet, Take 1 tablet by mouth Daily With Breakfast for 90 days., Disp: 30 tablet, Rfl: 2    norgestimate-ethinyl estradiol (ORTHO-CYCLEN)  "0.25-35 MG-MCG per tablet, Take 1 tablet by mouth Daily., Disp: 28 tablet, Rfl: 12    propranolol (INDERAL) 20 MG tablet, TAKE ONE TABLET BY MOUTH TWICE DAILY (hold IF heart rate is less THAN 60 beats PER minute), Disp: , Rfl:     Semaglutide-Weight Management 0.25 MG/0.5ML solution auto-injector, Inject 0.25 mg under the skin into the appropriate area as directed 1 (One) Time Per Week., Disp: 2 mL, Rfl: 0    traZODone (DESYREL) 100 MG tablet, Take 0.5-1 tablets by mouth At Night As Needed. for sleep, Disp: , Rfl:     tolterodine LA (Detrol LA) 2 MG 24 hr capsule, Take 1 capsule by mouth Daily., Disp: 30 capsule, Rfl: 2    Current Facility-Administered Medications:     cyanocobalamin injection 1,000 mcg, 1,000 mcg, Subcutaneous, Q28 Days, Anand Pierre MD, 1,000 mcg at 11/16/23 1050    Allergies:   No Known Allergies              Objective     Physical Exam:  Vital Signs:   Vitals:    11/28/23 0932   BP: 108/75   Pulse: 73   Temp: 98.1 °F (36.7 °C)   SpO2: 98%   Weight: 114 kg (252 lb)   Height: 160 cm (63\")     Body mass index is 44.64 kg/m².           Physical Exam  Cardiovascular:      Rate and Rhythm: Normal rate and regular rhythm.      Heart sounds: Normal heart sounds. No murmur heard.  Pulmonary:      Effort: Pulmonary effort is normal.      Breath sounds: Normal breath sounds.   Chest:   Breasts:     Right: Tenderness present. No inverted nipple, mass, nipple discharge or skin change.      Left: Tenderness present. No inverted nipple, mass, nipple discharge or skin change.   Abdominal:      General: Bowel sounds are normal.      Palpations: Abdomen is soft.   Genitourinary:     General: Normal vulva.      Vagina: Vaginal discharge present.      Cervix: Normal.      Uterus: Normal.       Adnexa:         Right: Tenderness present.         Left: Tenderness present.       Rectum: Normal.   Skin:     General: Skin is warm and dry.   Neurological:      Mental Status: She is alert.             Assessment " "/ Plan      Assessment/Plan:   Diagnoses and all orders for this visit:    1. Encounter for well woman exam with routine gynecological exam (Primary)    2. Pap smear for cervical cancer screening  -     IgP, Aptima HPV    3. Mixed stress and urge incontinence  -     Ambulatory Referral to Physical Therapy Pelvic Floor    4. Vaginal discharge  -     Gardnerella vaginalis, Trichomonas vaginalis, Candida albicans, DNA - Swab, Vagina    5. Pelvic pain  -     US Non-ob Transvaginal; Future    6. Breast pain in female  -     Mammo Diagnostic Digital Tomosynthesis Bilateral With CAD; Future    Other orders  -     tolterodine LA (Detrol LA) 2 MG 24 hr capsule; Take 1 capsule by mouth Daily.  Dispense: 30 capsule; Refill: 2       Counter for well woman exam pelvic exam completed Pap smear collected  Vaginal discharge vaginal screen collected we will call with results and further recommendations  Mixed stress and urge incontinence will refer to physical therapy for pelvic floor exercises and start Detrol LA  Pelvic pain will obtain pelvic ultrasound call with results and further recommendations  Breast pain in female will obtain diagnostic bilateral mammogram call with results and further recommendations        Follow Up:   Return in about 1 year (around 11/28/2024).    Healthcare Maintenance:   Counseling provided on screening  mammogram , screening pap, screening colonoscopy   Karine Swanson voices understanding and acceptance of this advice and will call back with any further questions or concerns. AVS with preventive healthcare tips printed for patient.     Zoë Yang, APRN    \"Please note that portions of this note were completed with a voice recognition program.\"    "

## 2023-11-30 LAB
CYTOLOGIST CVX/VAG CYTO: NORMAL
CYTOLOGY CVX/VAG DOC CYTO: NORMAL
CYTOLOGY CVX/VAG DOC THIN PREP: NORMAL
DX ICD CODE: NORMAL
HIV 1 & 2 AB SER-IMP: NORMAL
HPV I/H RISK 4 DNA CVX QL PROBE+SIG AMP: NEGATIVE
OTHER STN SPEC: NORMAL
STAT OF ADQ CVX/VAG CYTO-IMP: NORMAL

## 2023-12-05 DIAGNOSIS — N64.4 BREAST PAIN: Primary | ICD-10-CM

## 2023-12-13 ENCOUNTER — HOSPITAL ENCOUNTER (OUTPATIENT)
Dept: MAMMOGRAPHY | Facility: HOSPITAL | Age: 33
Discharge: HOME | End: 2023-12-13
Payer: MEDICAID

## 2023-12-13 ENCOUNTER — HOSPITAL ENCOUNTER (OUTPATIENT)
Dept: ULTRASOUND IMAGING | Facility: HOSPITAL | Age: 33
Discharge: HOME | End: 2023-12-13
Payer: MEDICAID

## 2023-12-13 DIAGNOSIS — N64.4 BREAST PAIN: ICD-10-CM

## 2023-12-13 DIAGNOSIS — N64.4 BREAST PAIN IN FEMALE: ICD-10-CM

## 2023-12-13 DIAGNOSIS — R10.2 PELVIC PAIN: ICD-10-CM

## 2023-12-13 PROCEDURE — 77066 DX MAMMO INCL CAD BI: CPT

## 2023-12-13 PROCEDURE — 76642 ULTRASOUND BREAST LIMITED: CPT

## 2023-12-13 PROCEDURE — 76830 TRANSVAGINAL US NON-OB: CPT

## 2023-12-13 PROCEDURE — G0279 TOMOSYNTHESIS, MAMMO: HCPCS

## 2023-12-15 ENCOUNTER — TELEPHONE (OUTPATIENT)
Dept: FAMILY MEDICINE CLINIC | Facility: CLINIC | Age: 33
End: 2023-12-15

## 2023-12-15 DIAGNOSIS — R93.89 ABNORMAL TRANSVAGINAL ULTRASOUND: Primary | ICD-10-CM

## 2023-12-15 NOTE — TELEPHONE ENCOUNTER
Caller: Karine Swanson    Relationship: Self    Best call back number: [phone]     Caller requesting test results: YES    What test was performed: ULTRASOUND OF BREAST    When was the test performed: 12.13.2023    Where was the test performed: [facility]    Additional notes: PATIENT WAS ADVISED BY [facility]IC [facility] THAT RESULTS FOR ULTRASOUND SHOULD BE MADE AVAILABLE BY 12.15.2023. PATIENT WANTING TO SPEAK WITH SOMEONE ABOUT RESULTS AS SOON AS POSSIBLE. PLEASE ADVISE.

## 2023-12-18 NOTE — TELEPHONE ENCOUNTER
Spoke to Mrs. Swanson  and she will have her call us 12/19/23 ,letter with sid. Had been mailed as well
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
## 2023-12-28 ENCOUNTER — OFFICE VISIT (OUTPATIENT)
Dept: PSYCHIATRY | Facility: CLINIC | Age: 33
End: 2023-12-28
Payer: MEDICAID

## 2023-12-28 VITALS
HEART RATE: 82 BPM | SYSTOLIC BLOOD PRESSURE: 119 MMHG | BODY MASS INDEX: 44.83 KG/M2 | WEIGHT: 253 LBS | DIASTOLIC BLOOD PRESSURE: 78 MMHG | HEIGHT: 63 IN

## 2023-12-28 DIAGNOSIS — F33.1 MAJOR DEPRESSIVE DISORDER, RECURRENT, MODERATE: ICD-10-CM

## 2023-12-28 DIAGNOSIS — F43.10 POST TRAUMATIC STRESS DISORDER (PTSD): Primary | ICD-10-CM

## 2023-12-28 DIAGNOSIS — F41.1 GAD (GENERALIZED ANXIETY DISORDER): ICD-10-CM

## 2023-12-28 PROBLEM — F41.9 ANXIETY AND DEPRESSION: Status: RESOLVED | Noted: 2020-10-29 | Resolved: 2023-12-28

## 2023-12-28 PROBLEM — F32.A ANXIETY AND DEPRESSION: Status: RESOLVED | Noted: 2020-10-29 | Resolved: 2023-12-28

## 2023-12-28 RX ORDER — BUPROPION HYDROCHLORIDE 150 MG/1
150 TABLET ORAL EVERY MORNING
Qty: 90 TABLET | Refills: 0 | Status: SHIPPED | OUTPATIENT
Start: 2023-12-28

## 2023-12-28 RX ORDER — LORAZEPAM 1 MG/1
1 TABLET ORAL
Qty: 90 TABLET | Refills: 0 | Status: SHIPPED | OUTPATIENT
Start: 2023-12-28

## 2023-12-28 RX ORDER — FLUOXETINE HYDROCHLORIDE 40 MG/1
80 CAPSULE ORAL DAILY
Start: 2023-12-28

## 2023-12-28 NOTE — TREATMENT PLAN
Multi-Disciplinary Problems (from Behavioral Health Treatment Plan)      Active Problems       Problem: Anxiety  Start Date: 12/28/23      Problem Details: The patient self-scales this problem as a 3 with 10 being the worst.          Goal Priority Start Date Expected End Date End Date    Patient will develop and implement behavioral and cognitive strategies to reduce anxiety and irrational fears. -- 12/28/23 -- --    Goal Details: Progress toward goal:  Not appropriate to rate progress toward goal since this is the initial treatment plan.          Goal Intervention Frequency Start Date End Date    Help patient explore past emotional issues in relation to present anxiety. PRN 12/28/23 --    Intervention Details: Duration of treatment until until remission of symptoms.          Goal Intervention Frequency Start Date End Date    Help patient develop an awareness of their cognitive and physical responses to anxiety. PRN 12/28/23 --    Intervention Details: Duration of treatment until until remission of symptoms.                  Problem: Depression  Start Date: 12/28/23      Problem Details: The patient self-scales this problem as a 4 with 10 being the worst.          Goal Priority Start Date Expected End Date End Date    Patient will demonstrate the ability to initiate new constructive life skills outside of sessions on a consistent basis. -- 12/28/23 -- --    Goal Details: Progress toward goal:  Not appropriate to rate progress toward goal since this is the initial treatment plan.          Goal Intervention Frequency Start Date End Date    Assist patient in setting attainable activities of daily living goals. PRN 12/28/23 --      Goal Intervention Frequency Start Date End Date    Provide education about depression PRN 12/28/23 --    Intervention Details: Duration of treatment until until remission of symptoms.          Goal Intervention Frequency Start Date End Date    Assist patient in developing healthy coping  strategies. PRN 12/28/23 --    Intervention Details: Duration of treatment until until remission of symptoms.                  Problem: Post Traumatic Stress  Start Date: 12/28/23      Problem Details: The patient self-scales this problem as a 5 with 10 being the worst.          Goal Priority Start Date Expected End Date End Date    Patient will process and move through trauma in a way that improves self regard and the patients ability to function optimally in the world around them. -- 12/28/23 -- --    Goal Details: Progress toward goal:  Not appropriate to rate progress toward goal since this is the initial treatment plan.          Goal Intervention Frequency Start Date End Date    Assist patient in identifying ways that trauma has negatively impacted their view of themselves and the world. PRN 12/28/23 --    Intervention Details: Duration of treatment until until remission of symptoms.          Goal Intervention Frequency Start Date End Date    Process trauma in the context of the safe session environment. PRN 12/28/23 --    Intervention Details: Duration of treatment until until remission of symptoms.          Goal Intervention Frequency Start Date End Date    Develop a plan of behavior changes that will reduce the stress of the trauma. PRN 12/28/23 --    Intervention Details: Duration of treatment until until remission of symptoms.                                 I have discussed and reviewed this treatment plan with the patient.

## 2024-01-24 ENCOUNTER — TELEPHONE (OUTPATIENT)
Dept: OBSTETRICS AND GYNECOLOGY | Facility: CLINIC | Age: 34
End: 2024-01-24

## 2024-01-24 ENCOUNTER — HOSPITAL ENCOUNTER (OUTPATIENT)
Dept: ULTRASOUND IMAGING | Facility: HOSPITAL | Age: 34
Discharge: HOME | End: 2024-01-24
Admitting: NURSE PRACTITIONER

## 2024-01-24 DIAGNOSIS — R93.89 ABNORMAL TRANSVAGINAL ULTRASOUND: ICD-10-CM

## 2024-01-24 DIAGNOSIS — R93.89 ABNORMAL ULTRASOUND: Primary | ICD-10-CM

## 2024-01-24 PROCEDURE — 76830 TRANSVAGINAL US NON-OB: CPT

## 2024-01-29 ENCOUNTER — OFFICE VISIT (OUTPATIENT)
Dept: OBSTETRICS AND GYNECOLOGY | Facility: CLINIC | Age: 34
End: 2024-01-29

## 2024-01-29 VITALS
SYSTOLIC BLOOD PRESSURE: 119 MMHG | HEIGHT: 63 IN | HEART RATE: 73 BPM | DIASTOLIC BLOOD PRESSURE: 83 MMHG | BODY MASS INDEX: 44.82 KG/M2

## 2024-01-29 DIAGNOSIS — R10.2 PELVIC PAIN: Primary | ICD-10-CM

## 2024-01-29 DIAGNOSIS — E66.9 OBESITY (BMI 30-39.9): ICD-10-CM

## 2024-01-29 PROCEDURE — 99213 OFFICE O/P EST LOW 20 MIN: CPT | Performed by: OBSTETRICS & GYNECOLOGY

## 2024-01-29 NOTE — PROGRESS NOTES
"GYN Visit    CC: Pelvic pain    HPI:   33 y.o. who presents to discuss pelvic pain.  The patient reports that her pain is really been significant since the delivery of her twins.  She reports that her pain really is not necessarily significantly worse with her menstrual cycle.  It is intermittent throughout the month.  It is worse with increased amounts of activity or heavy lifting.  After she has very active days or lifts a lot, then she can have several days of pain afterward.  She denies any unusual vaginal discharge, menstrual changes, fever, chills, nausea or vomiting.  Denies significant pain with intercourse.    History: PMHx, Meds, Allergies, PSHx, Social Hx, and POBHx all reviewed and updated.    /83   Pulse 73   Ht 160 cm (63\")   LMP 2024   BMI 44.82 kg/m²     Physical Exam  Vitals and nursing note reviewed. Exam conducted with a chaperone present.   Constitutional:       General: She is not in acute distress.     Appearance: Normal appearance. She is obese. She is not ill-appearing.   Abdominal:      General: There is no distension.      Palpations: Abdomen is soft. There is no mass.      Tenderness: There is no abdominal tenderness. There is no guarding or rebound.      Hernia: No hernia is present. There is no hernia in the left inguinal area or right inguinal area.   Genitourinary:     General: Normal vulva.      Exam position: Lithotomy position.      Pubic Area: No rash.       Labia:         Right: No rash, tenderness, lesion or injury.         Left: No rash, tenderness, lesion or injury.       Vagina: No signs of injury. No vaginal discharge, tenderness, bleeding or prolapsed vaginal walls.      Cervix: No cervical motion tenderness, discharge, friability, lesion, erythema or cervical bleeding.      Uterus: Not deviated, not enlarged, not fixed and not tender.       Adnexa:         Right: No mass, tenderness or fullness.          Left: No mass, tenderness or fullness.        " Comments: There is no significant tenderness with the patient's pelvic exam.  There are no palpable adnexal or uterine masses.  Musculoskeletal:         General: No swelling.      Right lower leg: No edema.      Left lower leg: No edema.   Neurological:      Mental Status: She is alert and oriented to person, place, and time.   Psychiatric:         Mood and Affect: Mood normal.         Behavior: Behavior normal.         Thought Content: Thought content normal.         Judgment: Judgment normal.     Labs 11/1/2023 reviewed including CMP, A1c, TSH, anemia profile, lipid profile, CBC  Pelvic ultrasound 1/24/2024 reviewed      ASSESSMENT AND PLAN:  Diagnoses and all orders for this visit:    1. Pelvic pain (Primary)  Assessment & Plan:  I reviewed today's exam findings as well as the findings on her ultrasound and most recent laboratories.  There was really only some nonspecific findings with the pelvic ultrasound which are likely clinically insignificant.  The patient's pelvic pain is not related to her menstrual cycle and she has no pain with intercourse.  It is related to increases in physical activity which leads me to suspect a more musculoskeletal etiology of the patient's pain.  The patient states that she has been referred for pelvic floor therapy.  I feel like this is a very good initial starting point for management of the patient's symptoms.  I have encouraged her to proceed forward with the pelvic floor therapy as I think this is what is most likely to benefit her pelvic pain.  I have also encouraged weight loss.  I recommended the use of OTC NSAIDs such as ibuprofen and/or Tylenol for days where she has increased pain.  Will follow-up in a few weeks to reevaluate after some sessions with the physical therapist.      2. Obesity (BMI 30-39.9)  Assessment & Plan:  Kathleen exercise, nutrition and recommended weight loss.  I do think that weight loss would help improve the patient's pelvic pain given it is felt to  be a musculoskeletal etiology.          Counseling: TRACK MENSES, RTO if <q21 days (frequent) or >q3mo (infrequent IF not on hormonal BC), >7d long, heavy, or painful.        Follow Up:  Return in about 3 months (around 4/29/2024) for Recheck.      Avi Kelly MD  01/29/2024

## 2024-02-01 PROBLEM — R10.2 PELVIC PAIN: Status: ACTIVE | Noted: 2024-02-01

## 2024-02-01 PROBLEM — Z30.09 BIRTH CONTROL COUNSELING: Status: RESOLVED | Noted: 2022-04-26 | Resolved: 2024-02-01

## 2024-02-01 NOTE — ASSESSMENT & PLAN NOTE
I reviewed today's exam findings as well as the findings on her ultrasound and most recent laboratories.  There was really only some nonspecific findings with the pelvic ultrasound which are likely clinically insignificant.  The patient's pelvic pain is not related to her menstrual cycle and she has no pain with intercourse.  It is related to increases in physical activity which leads me to suspect a more musculoskeletal etiology of the patient's pain.  The patient states that she has been referred for pelvic floor therapy.  I feel like this is a very good initial starting point for management of the patient's symptoms.  I have encouraged her to proceed forward with the pelvic floor therapy as I think this is what is most likely to benefit her pelvic pain.  I have also encouraged weight loss.  I recommended the use of OTC NSAIDs such as ibuprofen and/or Tylenol for days where she has increased pain.  Will follow-up in a few weeks to reevaluate after some sessions with the physical therapist.

## 2024-02-01 NOTE — ASSESSMENT & PLAN NOTE
Kathleen exercise, nutrition and recommended weight loss.  I do think that weight loss would help improve the patient's pelvic pain given it is felt to be a musculoskeletal etiology.

## 2024-02-02 PROBLEM — E78.2 MIXED HYPERLIPIDEMIA: Status: ACTIVE | Noted: 2024-02-02

## 2024-02-02 PROBLEM — R73.01 IMPAIRED FASTING BLOOD SUGAR: Status: ACTIVE | Noted: 2024-02-02

## 2024-02-07 ENCOUNTER — OFFICE VISIT (OUTPATIENT)
Dept: FAMILY MEDICINE CLINIC | Facility: CLINIC | Age: 34
End: 2024-02-07

## 2024-02-07 VITALS
SYSTOLIC BLOOD PRESSURE: 122 MMHG | TEMPERATURE: 98.1 F | HEIGHT: 63 IN | DIASTOLIC BLOOD PRESSURE: 85 MMHG | WEIGHT: 254.8 LBS | BODY MASS INDEX: 45.15 KG/M2 | OXYGEN SATURATION: 98 % | HEART RATE: 71 BPM

## 2024-02-07 DIAGNOSIS — N32.81 OVERACTIVE BLADDER: ICD-10-CM

## 2024-02-07 DIAGNOSIS — R73.01 IMPAIRED FASTING BLOOD SUGAR: Primary | ICD-10-CM

## 2024-02-07 DIAGNOSIS — Z79.899 MEDICATION MANAGEMENT: ICD-10-CM

## 2024-02-07 DIAGNOSIS — G89.29 CHRONIC BILATERAL LOW BACK PAIN WITH RIGHT-SIDED SCIATICA: ICD-10-CM

## 2024-02-07 DIAGNOSIS — F33.1 MAJOR DEPRESSIVE DISORDER, RECURRENT, MODERATE: ICD-10-CM

## 2024-02-07 DIAGNOSIS — E66.01 MORBID (SEVERE) OBESITY DUE TO EXCESS CALORIES: ICD-10-CM

## 2024-02-07 DIAGNOSIS — F41.1 GAD (GENERALIZED ANXIETY DISORDER): ICD-10-CM

## 2024-02-07 DIAGNOSIS — E78.2 MIXED HYPERLIPIDEMIA: ICD-10-CM

## 2024-02-07 DIAGNOSIS — M54.41 CHRONIC BILATERAL LOW BACK PAIN WITH RIGHT-SIDED SCIATICA: ICD-10-CM

## 2024-02-07 LAB

## 2024-02-07 RX ORDER — GABAPENTIN 300 MG/1
CAPSULE ORAL
Qty: 90 CAPSULE | Refills: 0 | Status: SHIPPED | OUTPATIENT
Start: 2024-02-07

## 2024-02-07 RX ORDER — TOLTERODINE 2 MG/1
2 CAPSULE, EXTENDED RELEASE ORAL DAILY
Qty: 30 CAPSULE | Refills: 5 | Status: SHIPPED | OUTPATIENT
Start: 2024-02-07

## 2024-02-07 RX ORDER — NORGESTIMATE AND ETHINYL ESTRADIOL 0.25-0.035
1 KIT ORAL DAILY
Qty: 28 TABLET | Refills: 12 | Status: SHIPPED | OUTPATIENT
Start: 2024-02-07

## 2024-02-07 NOTE — PROGRESS NOTES
Follow Up Office Visit      Patient Name: Karine Swanson  : 1990   MRN: [id number]     Chief Complaint:    Chief Complaint   Patient presents with    Follow-up    Hyperlipidemia    Anxiety    impaired fasting glucose    Depression    Obesity    Med Refill       History of Present Illness: Karine Swanson is a 33 y.o. female who is here today to follow up for hyperlipidemia, anxiety, depression, IFG, obesity.  Review pelvic US results     Labs-2023  Pap-2023  Mammogram- 2023    C/o  irregular cycles, extremely painful   Follow up gyn consult 2024  Diagnoses and all orders for this visit:     1. Pelvic pain (Primary)  Assessment & Plan:  I reviewed today's exam findings as well as the findings on her ultrasound and most recent laboratories.  There was really only some nonspecific findings with the pelvic ultrasound which are likely clinically insignificant.  The patient's pelvic pain is not related to her menstrual cycle and she has no pain with intercourse.  It is related to increases in physical activity which leads me to suspect a more musculoskeletal etiology of the patient's pain.  The patient states that she has been referred for pelvic floor therapy.  I feel like this is a very good initial starting point for management of the patient's symptoms.  I have encouraged her to proceed forward with the pelvic floor therapy as I think this is what is most likely to benefit her pelvic pain.  I have also encouraged weight loss.  I recommended the use of OTC NSAIDs such as ibuprofen and/or Tylenol for days where she has increased pain.  Will follow-up in a few weeks to reevaluate after some sessions with the physical therapist.          Also c/o low back pain with radiation to her right leg, chronic, since her twins were 3/25/22 progressively worse  Much worse in the past 2 weeks   Taking multiple ibuprofen and tylenol no relief   History of DDD of lumbar     Subjective      Review of Systems:   Review  of Systems   Constitutional:  Negative for fever.   Respiratory:  Negative for cough.    Cardiovascular:  Negative for chest pain.   Gastrointestinal:  Negative for abdominal pain, diarrhea, nausea and vomiting.   Genitourinary:  Positive for pelvic pain. Negative for dysuria.   Musculoskeletal:  Positive for back pain.   Neurological:  Positive for numbness.        Past Medical History:   Past Medical History:   Diagnosis Date    Ovarian cyst        Past Surgical History:   Past Surgical History:   Procedure Laterality Date     SECTION N/A 3/25/2022    Procedure:  SECTION PRIMARY;  Surgeon: Avi Kelly MD;  Location: [facility] LABOR DELIVERY;  Service: Gynecology;  Laterality: N/A;    LAPAROSCOPIC CHOLECYSTECTOMY      TONSILLECTOMY         Family History:   Family History   Problem Relation Age of Onset    No Known Problems Father     No Known Problems Mother     No Known Problems Brother     No Known Problems Sister     No Known Problems Paternal Grandfather     No Known Problems Paternal Grandmother     No Known Problems Maternal Grandmother     No Known Problems Maternal Grandfather     No Known Problems Maternal Aunt     No Known Problems Maternal Uncle     No Known Problems Paternal Aunt     No Known Problems Paternal Uncle     No Known Problems Cousin     No Known Problems Other     ADD / ADHD Neg Hx     Alcohol abuse Neg Hx     Anxiety disorder Neg Hx     Bipolar disorder Neg Hx     Dementia Neg Hx     Depression Neg Hx     Drug abuse Neg Hx     OCD Neg Hx     Paranoid behavior Neg Hx     Schizophrenia Neg Hx     Seizures Neg Hx     Self-Injurious Behavior  Neg Hx     Suicide Attempts Neg Hx     Breast cancer Neg Hx     Ovarian cancer Neg Hx     Uterine cancer Neg Hx     Colon cancer Neg Hx        Social History:   Social History     Socioeconomic History    Marital status:    Tobacco Use    Smoking status: Never    Smokeless tobacco: Never   Vaping Use    Vaping Use: Never  "used   Substance and Sexual Activity    Alcohol use: Never    Drug use: Never    Sexual activity: Yes     Partners: Male     Birth control/protection: None       Medications:     Current Outpatient Medications:     buPROPion XL (Wellbutrin XL) 150 MG 24 hr tablet, Take 1 tablet by mouth Every Morning. Take along with 300 mg tablet for total daily dose of 450 mg., Disp: 90 tablet, Rfl: 0    buPROPion XL (Wellbutrin XL) 300 MG 24 hr tablet, Take 1 tablet by mouth Daily., Disp: 90 tablet, Rfl: 3    FLUoxetine (PROzac) 40 MG capsule, Take 2 capsules by mouth Daily., Disp: , Rfl:     LORazepam (ATIVAN) 1 MG tablet, Take 1 tablet by mouth every night at bedtime., Disp: 90 tablet, Rfl: 0    metFORMIN ER (GLUCOPHAGE-XR) 500 MG 24 hr tablet, Take 1 tablet by mouth Daily With Breakfast for 90 days., Disp: 30 tablet, Rfl: 2    tolterodine LA (Detrol LA) 2 MG 24 hr capsule, Take 1 capsule by mouth Daily., Disp: 30 capsule, Rfl: 5    gabapentin (NEURONTIN) 300 MG capsule, One tablet orally qhs x 3 days then take one tablet twice daily for 3 days then take one tablet orally three times daily., Disp: 90 capsule, Rfl: 0    norgestimate-ethinyl estradiol (Sprintec 28) 0.25-35 MG-MCG per tablet, Take 1 tablet by mouth Daily., Disp: 28 tablet, Rfl: 12    Current Facility-Administered Medications:     cyanocobalamin injection 1,000 mcg, 1,000 mcg, Subcutaneous, Q28 Days, Anand Pierre MD, 1,000 mcg at 11/16/23 1050    Allergies:   No Known Allergies        PHQ-2 Total Score:     PHQ-9 Total Score:       Objective     Physical Exam:  Vital Signs:   Vitals:    02/07/24 1124   BP: 122/85   BP Location: Left arm   Patient Position: Sitting   Cuff Size: Adult   Pulse: 71   Temp: 98.1 °F (36.7 °C)   TempSrc: Temporal   SpO2: 98%   Weight: 116 kg (254 lb 12.8 oz)   Height: 160 cm (63\")     Body mass index is 45.14 kg/m².           Physical Exam  Neck:      Vascular: No carotid bruit.   Cardiovascular:      Rate and Rhythm: Normal " rate and regular rhythm.      Heart sounds: Normal heart sounds. No murmur heard.  Pulmonary:      Effort: Pulmonary effort is normal.      Breath sounds: Normal breath sounds.   Abdominal:      General: Bowel sounds are normal.      Palpations: Abdomen is soft.   Musculoskeletal:      Lumbar back: Tenderness and bony tenderness present. No swelling or edema. Positive left straight leg raise test. Negative right straight leg raise test.        Back:       Right lower leg: No edema.      Left lower leg: No edema.   Skin:     General: Skin is warm and dry.   Neurological:      Mental Status: She is alert.   Psychiatric:         Mood and Affect: Mood normal.         Behavior: Behavior normal.             Assessment / Plan      Assessment/Plan:   Diagnoses and all orders for this visit:    1. Impaired fasting blood sugar (Primary)    2. Morbid (severe) obesity due to excess calories    3. Mixed hyperlipidemia    4. Major depressive disorder, recurrent, moderate    5. EMMA (generalized anxiety disorder)    6. Chronic bilateral low back pain with right-sided sciatica  -     gabapentin (NEURONTIN) 300 MG capsule; One tablet orally qhs x 3 days then take one tablet twice daily for 3 days then take one tablet orally three times daily.  Dispense: 90 capsule; Refill: 0  -     MRI Lumbar Spine Without Contrast; Future    7. Overactive bladder    8. Medication management  -     POC Urine Drug Screen [facility]    Other orders  -     norgestimate-ethinyl estradiol (Sprintec 28) 0.25-35 MG-MCG per tablet; Take 1 tablet by mouth Daily.  Dispense: 28 tablet; Refill: 12  -     tolterodine LA (Detrol LA) 2 MG 24 hr capsule; Take 1 capsule by mouth Daily.  Dispense: 30 capsule; Refill: 5       Impaired fasting glucose recommend reduce added carbs sugars increase lean protein exercise 30 minutes daily and weight loss  Morbid obesity recommend reduce overall caloric intake increase water intake patient currently does not have  "insurance  Hyperlipidemia Reca reduce fried foods red meat pork products cheese increase fruits vegetables whole grains exercise 30 minutes daily weight loss  Anxiety depression currently managed by psychiatry  Chronic low back pain with right-sided sciatica worsening we will obtain MRI lumbar spine start gabapentin follow-up in 4 weeks  Overactive bladder currently controlled with Detrol LA will provide refills      Follow Up:   Return in about 4 weeks (around 3/6/2024).    Dayana Yang, APRN    \"Please note that portions of this note were completed with a voice recognition program.\"    "

## 2024-02-12 DIAGNOSIS — G89.29 CHRONIC BILATERAL LOW BACK PAIN WITH RIGHT-SIDED SCIATICA: ICD-10-CM

## 2024-02-12 DIAGNOSIS — M54.41 CHRONIC BILATERAL LOW BACK PAIN WITH RIGHT-SIDED SCIATICA: ICD-10-CM

## 2024-02-13 ENCOUNTER — OFFICE VISIT (OUTPATIENT)
Dept: PSYCHIATRY | Facility: CLINIC | Age: 34
End: 2024-02-13

## 2024-02-13 VITALS
SYSTOLIC BLOOD PRESSURE: 138 MMHG | HEIGHT: 63 IN | DIASTOLIC BLOOD PRESSURE: 91 MMHG | HEART RATE: 78 BPM | WEIGHT: 253 LBS | BODY MASS INDEX: 44.83 KG/M2

## 2024-02-13 DIAGNOSIS — F33.1 MAJOR DEPRESSIVE DISORDER, RECURRENT, MODERATE: ICD-10-CM

## 2024-02-13 DIAGNOSIS — F43.12 NIGHTMARES ASSOCIATED WITH CHRONIC POST-TRAUMATIC STRESS DISORDER: ICD-10-CM

## 2024-02-13 DIAGNOSIS — F51.5 NIGHTMARES ASSOCIATED WITH CHRONIC POST-TRAUMATIC STRESS DISORDER: ICD-10-CM

## 2024-02-13 DIAGNOSIS — F43.10 POST TRAUMATIC STRESS DISORDER (PTSD): Primary | ICD-10-CM

## 2024-02-13 DIAGNOSIS — F41.1 GAD (GENERALIZED ANXIETY DISORDER): ICD-10-CM

## 2024-02-13 DIAGNOSIS — F51.04 INSOMNIA, PSYCHOPHYSIOLOGICAL: ICD-10-CM

## 2024-02-13 PROCEDURE — 99214 OFFICE O/P EST MOD 30 MIN: CPT | Performed by: PSYCHIATRY & NEUROLOGY

## 2024-02-13 PROCEDURE — 90833 PSYTX W PT W E/M 30 MIN: CPT | Performed by: PSYCHIATRY & NEUROLOGY

## 2024-02-13 RX ORDER — ARIPIPRAZOLE 2 MG/1
2 TABLET ORAL DAILY
Qty: 30 TABLET | Refills: 1 | Status: SHIPPED | OUTPATIENT
Start: 2024-02-13

## 2024-02-13 RX ORDER — PRAZOSIN HYDROCHLORIDE 1 MG/1
1 CAPSULE ORAL NIGHTLY
Qty: 90 CAPSULE | Refills: 0 | Status: SHIPPED | OUTPATIENT
Start: 2024-02-13

## 2024-03-05 PROBLEM — G89.29 CHRONIC RIGHT-SIDED LOW BACK PAIN WITH RIGHT-SIDED SCIATICA: Status: ACTIVE | Noted: 2024-03-05

## 2024-03-05 PROBLEM — M54.41 CHRONIC RIGHT-SIDED LOW BACK PAIN WITH RIGHT-SIDED SCIATICA: Status: ACTIVE | Noted: 2024-03-05

## 2024-03-06 ENCOUNTER — OFFICE VISIT (OUTPATIENT)
Dept: FAMILY MEDICINE CLINIC | Facility: CLINIC | Age: 34
End: 2024-03-06

## 2024-03-06 VITALS
DIASTOLIC BLOOD PRESSURE: 84 MMHG | OXYGEN SATURATION: 98 % | TEMPERATURE: 97.8 F | SYSTOLIC BLOOD PRESSURE: 117 MMHG | HEIGHT: 63 IN | BODY MASS INDEX: 45.18 KG/M2 | WEIGHT: 255 LBS | HEART RATE: 81 BPM

## 2024-03-06 DIAGNOSIS — G89.29 CHRONIC BILATERAL LOW BACK PAIN WITH RIGHT-SIDED SCIATICA: Primary | ICD-10-CM

## 2024-03-06 DIAGNOSIS — E66.01 MORBID (SEVERE) OBESITY DUE TO EXCESS CALORIES: ICD-10-CM

## 2024-03-06 DIAGNOSIS — M54.41 CHRONIC BILATERAL LOW BACK PAIN WITH RIGHT-SIDED SCIATICA: Primary | ICD-10-CM

## 2024-03-06 RX ORDER — IBUPROFEN 800 MG/1
800 TABLET ORAL EVERY 8 HOURS PRN
Qty: 270 TABLET | Refills: 1 | Status: SHIPPED | OUTPATIENT
Start: 2024-03-06

## 2024-03-06 RX ORDER — METFORMIN HYDROCHLORIDE 500 MG/1
2000 TABLET, EXTENDED RELEASE ORAL
Qty: 120 TABLET | Refills: 5 | Status: SHIPPED | OUTPATIENT
Start: 2024-03-06 | End: 2024-09-02

## 2024-03-06 RX ORDER — GABAPENTIN 300 MG/1
600 CAPSULE ORAL 3 TIMES DAILY
Qty: 540 CAPSULE | Refills: 1 | Status: SHIPPED | OUTPATIENT
Start: 2024-03-06

## 2024-03-06 NOTE — PROGRESS NOTES
Follow Up Office Visit      Patient Name: Karine Swanson  : 1990   MRN: [id number]     Chief Complaint:    Chief Complaint   Patient presents with    chronic low back pain       History of Present Illness: Karine Swanson is a 33 y.o. female who is here today to follow up for chronic low back pain with sciatica.    MRI was normal. Recommended PT and pain management.  Pain management would not schedule due to not having insurance     She has been doing PT once a week and says it has helped with her pelvic pain, but not with her back pain.  Follow up up new start gabapentin, helping but would like to increase dose     Pain worse with sitting, standing for long periods of time    Labs-2023  Pap-2023  Mammogram- 2023        Subjective      Review of Systems:   Review of Systems   Constitutional:  Negative for fever.   Respiratory:  Negative for cough.    Cardiovascular:  Negative for chest pain.   Gastrointestinal:  Negative for abdominal pain, constipation, diarrhea and vomiting.   Genitourinary:  Negative for dysuria.   Musculoskeletal:  Positive for back pain.   Neurological:  Negative for numbness.        Past Medical History:   Past Medical History:   Diagnosis Date    Ovarian cyst        Past Surgical History:   Past Surgical History:   Procedure Laterality Date     SECTION N/A 3/25/2022    Procedure:  SECTION PRIMARY;  Surgeon: Avi Kelly MD;  Location: [facility] LABOR DELIVERY;  Service: Gynecology;  Laterality: N/A;    LAPAROSCOPIC CHOLECYSTECTOMY      TONSILLECTOMY         Family History:   Family History   Problem Relation Age of Onset    No Known Problems Father     No Known Problems Mother     No Known Problems Brother     No Known Problems Sister     No Known Problems Paternal Grandfather     No Known Problems Paternal Grandmother     No Known Problems Maternal Grandmother     No Known Problems Maternal Grandfather     No Known Problems Maternal Aunt     No Known  Problems Maternal Uncle     No Known Problems Paternal Aunt     No Known Problems Paternal Uncle     No Known Problems Cousin     No Known Problems Other     ADD / ADHD Neg Hx     Alcohol abuse Neg Hx     Anxiety disorder Neg Hx     Bipolar disorder Neg Hx     Dementia Neg Hx     Depression Neg Hx     Drug abuse Neg Hx     OCD Neg Hx     Paranoid behavior Neg Hx     Schizophrenia Neg Hx     Seizures Neg Hx     Self-Injurious Behavior  Neg Hx     Suicide Attempts Neg Hx     Breast cancer Neg Hx     Ovarian cancer Neg Hx     Uterine cancer Neg Hx     Colon cancer Neg Hx        Social History:   Social History     Socioeconomic History    Marital status:    Tobacco Use    Smoking status: Never    Smokeless tobacco: Never   Vaping Use    Vaping status: Never Used   Substance and Sexual Activity    Alcohol use: Never    Drug use: Never    Sexual activity: Yes     Partners: Male     Birth control/protection: None       Medications:     Current Outpatient Medications:     ARIPiprazole (ABILIFY) 2 MG tablet, Take 1 tablet by mouth Daily., Disp: 30 tablet, Rfl: 1    buPROPion XL (Wellbutrin XL) 150 MG 24 hr tablet, Take 1 tablet by mouth Every Morning. Take along with 300 mg tablet for total daily dose of 450 mg., Disp: 90 tablet, Rfl: 0    buPROPion XL (Wellbutrin XL) 300 MG 24 hr tablet, Take 1 tablet by mouth Daily., Disp: 90 tablet, Rfl: 3    FLUoxetine (PROzac) 40 MG capsule, Take 2 capsules by mouth Daily., Disp: , Rfl:     gabapentin (NEURONTIN) 300 MG capsule, Take 2 capsules by mouth 3 (Three) Times a Day., Disp: 540 capsule, Rfl: 1    LORazepam (ATIVAN) 1 MG tablet, Take 1 tablet by mouth every night at bedtime., Disp: 90 tablet, Rfl: 0    norgestimate-ethinyl estradiol (Sprintec 28) 0.25-35 MG-MCG per tablet, Take 1 tablet by mouth Daily., Disp: 28 tablet, Rfl: 12    prazosin (MINIPRESS) 1 MG capsule, Take 1 capsule by mouth Every Night., Disp: 90 capsule, Rfl: 0    tolterodine LA (Detrol LA) 2 MG 24 hr  "capsule, Take 1 capsule by mouth Daily., Disp: 30 capsule, Rfl: 5    ibuprofen (ADVIL,MOTRIN) 800 MG tablet, Take 1 tablet by mouth Every 8 (Eight) Hours As Needed for Moderate Pain., Disp: 270 tablet, Rfl: 1    metFORMIN ER (GLUCOPHAGE-XR) 500 MG 24 hr tablet, Take 1 tablet by mouth Daily With Breakfast for 90 days., Disp: 30 tablet, Rfl: 2    Current Facility-Administered Medications:     cyanocobalamin injection 1,000 mcg, 1,000 mcg, Subcutaneous, Q28 Days, Anand Pierre MD, 1,000 mcg at 11/16/23 1050    Allergies:   No Known Allergies          Objective     Physical Exam:  Vital Signs:   Vitals:    03/06/24 1202   BP: 117/84   Pulse: 81   Temp: 97.8 °F (36.6 °C)   SpO2: 98%   Weight: 116 kg (255 lb)   Height: 160 cm (63\")     Body mass index is 45.17 kg/m².           Physical Exam  Cardiovascular:      Rate and Rhythm: Normal rate and regular rhythm.      Heart sounds: Normal heart sounds. No murmur heard.  Pulmonary:      Effort: Pulmonary effort is normal.      Breath sounds: Normal breath sounds.   Abdominal:      General: Bowel sounds are normal.      Palpations: Abdomen is soft.   Musculoskeletal:      Lumbar back: Tenderness present. No swelling or edema. Normal range of motion. Negative right straight leg raise test and negative left straight leg raise test.        Back:       Right lower leg: No edema.      Left lower leg: No edema.   Skin:     General: Skin is warm and dry.   Neurological:      Mental Status: She is alert.      Gait: Gait normal.      Deep Tendon Reflexes: Reflexes normal.   Psychiatric:         Mood and Affect: Mood normal.         Behavior: Behavior normal.             Assessment / Plan      Assessment/Plan:   Diagnoses and all orders for this visit:    1. Chronic bilateral low back pain with right-sided sciatica (Primary)  -     gabapentin (NEURONTIN) 300 MG capsule; Take 2 capsules by mouth 3 (Three) Times a Day.  Dispense: 540 capsule; Refill: 1    Other orders  -     " "ibuprofen (ADVIL,MOTRIN) 800 MG tablet; Take 1 tablet by mouth Every 8 (Eight) Hours As Needed for Moderate Pain.  Dispense: 270 tablet; Refill: 1         Chronic low back pain, normal MRI, normal xrays, will increase gabapentin 600 mg po TID, and add ibuprofen PRN only take with food, and continue physical therapy and weight loss       Follow Up:   Return in about 3 months (around 6/6/2024).    Dayana Yang, APRN    \"Please note that portions of this note were completed with a voice recognition program.\"    "

## 2024-03-13 ENCOUNTER — OFFICE VISIT (OUTPATIENT)
Dept: PSYCHIATRY | Facility: CLINIC | Age: 34
End: 2024-03-13
Payer: MEDICAID

## 2024-03-13 VITALS
HEIGHT: 63 IN | HEART RATE: 99 BPM | SYSTOLIC BLOOD PRESSURE: 134 MMHG | BODY MASS INDEX: 45.46 KG/M2 | WEIGHT: 256.6 LBS | DIASTOLIC BLOOD PRESSURE: 82 MMHG

## 2024-03-13 DIAGNOSIS — F43.10 POST TRAUMATIC STRESS DISORDER (PTSD): Primary | ICD-10-CM

## 2024-03-13 DIAGNOSIS — F41.1 GAD (GENERALIZED ANXIETY DISORDER): ICD-10-CM

## 2024-03-13 DIAGNOSIS — F33.1 MAJOR DEPRESSIVE DISORDER, RECURRENT, MODERATE: ICD-10-CM

## 2024-03-13 RX ORDER — BUPROPION HYDROCHLORIDE 300 MG/1
300 TABLET ORAL EVERY MORNING
Qty: 90 TABLET | Refills: 0 | Status: SHIPPED | OUTPATIENT
Start: 2024-03-13

## 2024-03-13 RX ORDER — BUPROPION HYDROCHLORIDE 150 MG/1
150 TABLET ORAL EVERY MORNING
Qty: 90 TABLET | Refills: 0 | Status: SHIPPED | OUTPATIENT
Start: 2024-03-13

## 2024-03-13 RX ORDER — ARIPIPRAZOLE 2 MG/1
2 TABLET ORAL NIGHTLY
Qty: 90 TABLET | Refills: 0 | Status: SHIPPED | OUTPATIENT
Start: 2024-03-13

## 2024-03-13 RX ORDER — LORAZEPAM 1 MG/1
1 TABLET ORAL
Qty: 90 TABLET | Refills: 0 | Status: SHIPPED | OUTPATIENT
Start: 2024-03-13

## 2024-03-13 RX ORDER — FLUOXETINE HYDROCHLORIDE 40 MG/1
80 CAPSULE ORAL DAILY
Qty: 180 CAPSULE | Refills: 0 | Status: SHIPPED | OUTPATIENT
Start: 2024-03-13
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
## 2024-04-22 ENCOUNTER — OFFICE VISIT (OUTPATIENT)
Dept: OBSTETRICS AND GYNECOLOGY | Facility: CLINIC | Age: 34
End: 2024-04-22

## 2024-04-22 ENCOUNTER — OFFICE VISIT (OUTPATIENT)
Dept: FAMILY MEDICINE CLINIC | Facility: CLINIC | Age: 34
End: 2024-04-22

## 2024-04-22 VITALS
DIASTOLIC BLOOD PRESSURE: 76 MMHG | OXYGEN SATURATION: 100 % | HEART RATE: 93 BPM | SYSTOLIC BLOOD PRESSURE: 127 MMHG | BODY MASS INDEX: 45.36 KG/M2 | TEMPERATURE: 97.9 F | HEIGHT: 63 IN | WEIGHT: 256 LBS

## 2024-04-22 VITALS
DIASTOLIC BLOOD PRESSURE: 84 MMHG | HEIGHT: 63 IN | WEIGHT: 256 LBS | HEART RATE: 97 BPM | BODY MASS INDEX: 45.36 KG/M2 | SYSTOLIC BLOOD PRESSURE: 128 MMHG

## 2024-04-22 DIAGNOSIS — E66.9 OBESITY (BMI 30-39.9): ICD-10-CM

## 2024-04-22 DIAGNOSIS — L98.9 SKIN LESION: Primary | ICD-10-CM

## 2024-04-22 DIAGNOSIS — R51.9 NONINTRACTABLE HEADACHE, UNSPECIFIED CHRONICITY PATTERN, UNSPECIFIED HEADACHE TYPE: ICD-10-CM

## 2024-04-22 DIAGNOSIS — R10.2 PELVIC PAIN: Primary | ICD-10-CM

## 2024-04-22 DIAGNOSIS — Z30.09 BIRTH CONTROL COUNSELING: ICD-10-CM

## 2024-04-22 PROBLEM — E66.01 MORBID (SEVERE) OBESITY DUE TO EXCESS CALORIES: Status: RESOLVED | Noted: 2023-11-01 | Resolved: 2024-04-22

## 2024-04-22 PROCEDURE — 99213 OFFICE O/P EST LOW 20 MIN: CPT | Performed by: STUDENT IN AN ORGANIZED HEALTH CARE EDUCATION/TRAINING PROGRAM

## 2024-04-22 PROCEDURE — 99214 OFFICE O/P EST MOD 30 MIN: CPT | Performed by: OBSTETRICS & GYNECOLOGY

## 2024-04-22 RX ORDER — SODIUM CHLORIDE, SODIUM LACTATE, POTASSIUM CHLORIDE, CALCIUM CHLORIDE 600; 310; 30; 20 MG/100ML; MG/100ML; MG/100ML; MG/100ML
125 INJECTION, SOLUTION INTRAVENOUS CONTINUOUS
OUTPATIENT
Start: 2024-04-22

## 2024-04-22 RX ORDER — SODIUM CHLORIDE 9 MG/ML
40 INJECTION, SOLUTION INTRAVENOUS AS NEEDED
OUTPATIENT
Start: 2024-04-22

## 2024-04-22 RX ORDER — SODIUM CHLORIDE 0.9 % (FLUSH) 0.9 %
10 SYRINGE (ML) INJECTION AS NEEDED
OUTPATIENT
Start: 2024-04-22

## 2024-04-22 RX ORDER — SUMATRIPTAN 25 MG/1
TABLET, FILM COATED ORAL
Qty: 30 TABLET | Refills: 3 | Status: SHIPPED | OUTPATIENT
Start: 2024-04-22

## 2024-04-22 RX ORDER — ONDANSETRON 2 MG/ML
4 INJECTION INTRAMUSCULAR; INTRAVENOUS EVERY 6 HOURS PRN
OUTPATIENT
Start: 2024-04-22

## 2024-04-22 RX ORDER — SODIUM CHLORIDE 0.9 % (FLUSH) 0.9 %
3 SYRINGE (ML) INJECTION EVERY 12 HOURS SCHEDULED
OUTPATIENT
Start: 2024-04-22

## 2024-04-22 NOTE — PROGRESS NOTES
"Chief Complaint  Rash (Rash on stomach, right leg, back and left upper arm. Stays irritated.  Area on abdomen is getting better.   concerned because occasionally areas will bleed. ) and heacaches (Increased headaches. A lot of time it is on the left side of forehead and left side of head.  Also has them all the way across forehead.  Has had these headaches for years but seem like they getting worse over the last 2-3 weeks. No nausea. )    Subjective      Karine Swanson is a 33 y.o. female who presents to [facility]     Patient with small vesicle with no pattern distribute in multiple areas of her body. Pt is not concern with the skin lesions but  want her to come and check. Might benefit from dermatology eval.     Also, complaining of headache with aura. Pt use to take tylenol and ibuprofen but now it is not working. Headache daily and last 24-48 hours.     Objective   Vital Signs:   Vitals:    04/22/24 1537   BP: 127/76   BP Location: Left arm   Patient Position: Sitting   Cuff Size: Adult   Pulse: 93   Temp: 97.9 °F (36.6 °C)   SpO2: 100%   Weight: 116 kg (256 lb)   Height: 160 cm (62.99\")     Body mass index is 45.36 kg/m².    Wt Readings from Last 3 Encounters:   04/22/24 116 kg (256 lb)   04/22/24 116 kg (256 lb)   03/13/24 116 kg (256 lb 9.6 oz)     BP Readings from Last 3 Encounters:   04/22/24 127/76   04/22/24 128/84   03/13/24 134/82       Health Maintenance   Topic Date Due    ANNUAL PHYSICAL  Never done    COVID-19 Vaccine (1 - 2023-24 season) Never done    TDAP/TD VACCINES (1 - Tdap) 11/28/2024 (Originally 6/7/2010)    INFLUENZA VACCINE  08/01/2024    LIPID PANEL  11/01/2024    BMI FOLLOWUP  11/28/2024    PAP SMEAR  11/28/2026    HEPATITIS C SCREENING  Completed    Pneumococcal Vaccine 0-64  Aged Out       Physical Exam  Vitals reviewed.   HENT:      Head: Normocephalic.      Mouth/Throat:      Mouth: Mucous membranes are moist.   Eyes:      Pupils: Pupils are " equal, round, and reactive to light.   Cardiovascular:      Rate and Rhythm: Normal rate.   Abdominal:      General: Abdomen is flat.   Musculoskeletal:         General: Normal range of motion.      Cervical back: Normal range of motion.   Skin:     General: Skin is warm.      Capillary Refill: Capillary refill takes less than 2 seconds.   Neurological:      Mental Status: She is alert.          Assessment & Plan  Skin lesion  Dermatology eval   Nonintractable headache, unspecified chronicity pattern, unspecified headache type  Trial triptan and reeval    Orders Placed This Encounter   Procedures    Ambulatory Referral to Dermatology     New Medications Ordered This Visit   Medications    SUMAtriptan (IMITREX) 25 MG tablet     Sig: Take one tablet at onset of headache. May repeat dose one time in 2 hours if headache not relieved.     Dispense:  30 tablet     Refill:  3                  I spent 25 minutes caring for Karine on this date of service. This time includes time spent by me in the following activities:preparing for the visit, reviewing tests, obtaining and/or reviewing a separately obtained history, performing a medically appropriate examination and/or evaluation , counseling and educating the patient/family/caregiver, ordering medications, tests, or procedures, referring and communicating with other health care professionals , documenting information in the medical record, independently interpreting results and communicating that information with the patient/family/caregiver, and care coordination  FOLLOW UP  No follow-ups on file.  Patient was given instructions and counseling regarding her condition or for health maintenance advice. Please see specific information pulled into the AVS if appropriate.       Anand Swenson MD  04/22/24  15:55 EDT    CURRENT & DISCONTINUED MEDICATIONS  Current Outpatient Medications   Medication Instructions    ARIPiprazole (ABILIFY) 2 mg, Oral, Nightly     buPROPion XL (WELLBUTRIN XL) 150 mg, Oral, Every Morning, Take along with 300 mg tablet for total daily dose of 450 mg.    buPROPion XL (WELLBUTRIN XL) 300 mg, Oral, Every Morning, Take along with 150 mg tablet for a total daily dose of 450 mg.    FLUoxetine (PROZAC) 80 mg, Oral, Daily    gabapentin (NEURONTIN) 600 mg, Oral, 3 Times Daily    ibuprofen (ADVIL,MOTRIN) 800 mg, Oral, Every 8 Hours PRN    LORazepam (ATIVAN) 1 mg, Oral, Every Night at Bedtime    metFORMIN ER (GLUCOPHAGE-XR) 2,000 mg, Oral, Daily With Breakfast    norgestimate-ethinyl estradiol (Sprintec 28) 0.25-35 MG-MCG per tablet 1 tablet, Oral, Daily    prazosin (MINIPRESS) 1 mg, Oral, Nightly    SUMAtriptan (IMITREX) 25 MG tablet Take one tablet at onset of headache. May repeat dose one time in 2 hours if headache not relieved.    tolterodine LA (DETROL LA) 2 mg, Oral, Daily       Medications Discontinued During This Encounter   Medication Reason    cyanocobalamin injection 1,000 mcg

## 2024-04-22 NOTE — ASSESSMENT & PLAN NOTE
The patient has chronic pelvic pain that is unexplained and persistent despite medical therapies and pelvic floor therapy.  We discussed the option of pursuing a diagnostic laparoscopy to evaluate gynecologic etiologies further.  We discussed the most common gynecologic etiology to situation like this would be endometriosis.  We did discuss that I cannot guarantee that there will be any abnormal findings and that her pain may not be gynecologic in origin.  The risks, benefits, and alternatives to the procedure have been reviewed the patient.  The risks included, but not limited to, infection, bleeding, hemorrhage, blood transfusion. Injury to nearby structures including: Bowel, bladder, pelvic blood vessels and nerves, ureters, and other nearby structures.  We discussed the risks of anesthesia.  The risks of postoperative complications, such as: Venous thromboembolism, myocardial infarction, stroke, and death.  The patient expressed her understanding of the risks, benefits, and alternatives to the procedure, and wishes to proceed.  Continue prescription Motrin 800 mg every 8 hours as needed for pelvic pain.  Make sure you take this with plenty of food and/or water.

## 2024-04-22 NOTE — PROGRESS NOTES
"[facility]  Gynecological Visit    CC: Follow-up pelvic pain    Subjective:   33 y.o. who presents in follow-up of pelvic pain.  The patient reports that she has been doing pelvic floor therapy.  She may have noticed a small improvement in her symptoms but overall she still is having significant pelvic pain.  She now has been monitoring her symptoms around her cycle and definitely notices an increase in pain with the menstrual cycle.  The birth control pills have not helped this.  She also has pain outside of her menstrual cycle, particularly if she is up on her feet a lot or sits for long periods of time.    History:   Past medical history, medications, allergies, surgical history, social history, and obstetrical history all reviewed and updated.    Last Completed Pap Smear            PAP SMEAR (Every 3 Years) Next due on 2026  IgP, Aptima HPV                  Objective:/84   Pulse 97   Ht 160 cm (62.99\")   Wt 116 kg (256 lb)   LMP 2024 (Approximate)   Breastfeeding No   BMI 45.36 kg/m²     Physical Exam  Vitals and nursing note reviewed.   Constitutional:       General: She is not in acute distress.     Appearance: Normal appearance. She is not ill-appearing.   HENT:      Head: Normocephalic and atraumatic.   Neck:      Thyroid: No thyroid mass or thyromegaly.   Abdominal:      General: Abdomen is flat. There is no distension.      Palpations: Abdomen is soft. There is no mass.      Tenderness: There is no abdominal tenderness. There is no guarding or rebound.   Musculoskeletal:         General: No swelling.      Right lower leg: No edema.      Left lower leg: No edema.   Skin:     General: Skin is warm and dry.      Findings: No rash.   Neurological:      Mental Status: She is alert and oriented to person, place, and time.   Psychiatric:         Mood and Affect: Mood normal.         Behavior: Behavior normal.         Thought Content: Thought " content normal.       Assessment and Plan:  Diagnoses and all orders for this visit:    1. Pelvic pain (Primary)  Assessment & Plan:  The patient has chronic pelvic pain that is unexplained and persistent despite medical therapies and pelvic floor therapy.  We discussed the option of pursuing a diagnostic laparoscopy to evaluate gynecologic etiologies further.  We discussed the most common gynecologic etiology to situation like this would be endometriosis.  We did discuss that I cannot guarantee that there will be any abnormal findings and that her pain may not be gynecologic in origin.  The risks, benefits, and alternatives to the procedure have been reviewed the patient.  The risks included, but not limited to, infection, bleeding, hemorrhage, blood transfusion. Injury to nearby structures including: Bowel, bladder, pelvic blood vessels and nerves, ureters, and other nearby structures.  We discussed the risks of anesthesia.  The risks of postoperative complications, such as: Venous thromboembolism, myocardial infarction, stroke, and death.  The patient expressed her understanding of the risks, benefits, and alternatives to the procedure, and wishes to proceed.  Continue prescription Motrin 800 mg every 8 hours as needed for pelvic pain.  Make sure you take this with plenty of food and/or water.    Orders:  -     Case Request; Standing  -     sodium chloride 0.9 % flush 3 mL  -     sodium chloride 0.9 % flush 10 mL  -     sodium chloride 0.9 % infusion 40 mL  -     lactated ringers infusion  -     ondansetron (ZOFRAN) injection 4 mg  -     ceFAZolin (ANCEF) 2,000 mg in sodium chloride 0.9 % 100 mL IVPB  -     Case Request    2. Obesity (BMI 30-39.9)    3. Birth control counseling  Assessment & Plan:  Continue Sprintec 1 tablet oral daily.  The risk, benefits, alternatives were reviewed.      Other orders  -     Follow Anesthesia Guidelines / Protocol; Future  -     Follow Anesthesia Guidelines / Protocol;  Standing  -     Verify / Perform Chlorhexidine Skin Prep; Standing  -     Verify / Perform Chlorhexidine Skin Prep if Indicated (If Not Already Completed); Standing  -     Obtain Informed Consent; Future  -     Provide NPO Instructions to Patient; Future  -     Chlorhexidine Skin Prep; Future  -     Notify Physician - Standard; Standing  -     Type & Screen; Standing  -     Insert Peripheral IV; Standing  -     Saline Lock & Maintain IV Access; Standing  -     Place Sequential Compression Device; Standing  -     Maintain Sequential Compression Device; Standing  -     CBC & Differential; Standing  -     hCG, Quantitative, Pregnancy; Standing        Counseling: TRACK MENSES, RTO if <q21 days (frequent) or >q3mo (infrequent IF not on hormonal BC), >7d long, heavy, or painful.    All BIRTH CONTROL options R/B/A/SE/E of each reviewed in detail.  NURIA risk w hormonal BIRTH CONTROL reviewed (estrogen containing only), S/Sx to watch for discussed and questions answered.  Newer studies indicate possible increased breast cancer reviewed (both estrogen and progestin only).    Follow Up:  Return for After surgery.    Avi Kelly MD  04/22/2024

## 2024-04-30 ENCOUNTER — OFFICE VISIT (OUTPATIENT)
Dept: PSYCHIATRY | Facility: CLINIC | Age: 34
End: 2024-04-30

## 2024-04-30 VITALS
HEIGHT: 63 IN | WEIGHT: 254.6 LBS | DIASTOLIC BLOOD PRESSURE: 77 MMHG | BODY MASS INDEX: 45.11 KG/M2 | HEART RATE: 101 BPM | SYSTOLIC BLOOD PRESSURE: 126 MMHG

## 2024-04-30 DIAGNOSIS — F51.04 INSOMNIA, PSYCHOPHYSIOLOGICAL: ICD-10-CM

## 2024-04-30 DIAGNOSIS — F41.1 GAD (GENERALIZED ANXIETY DISORDER): Primary | ICD-10-CM

## 2024-04-30 DIAGNOSIS — F43.10 POST TRAUMATIC STRESS DISORDER (PTSD): ICD-10-CM

## 2024-04-30 DIAGNOSIS — F33.1 MAJOR DEPRESSIVE DISORDER, RECURRENT, MODERATE: ICD-10-CM

## 2024-04-30 DIAGNOSIS — F51.5 NIGHTMARES ASSOCIATED WITH CHRONIC POST-TRAUMATIC STRESS DISORDER: ICD-10-CM

## 2024-04-30 DIAGNOSIS — F43.12 NIGHTMARES ASSOCIATED WITH CHRONIC POST-TRAUMATIC STRESS DISORDER: ICD-10-CM

## 2024-04-30 RX ORDER — CLONIDINE HYDROCHLORIDE 0.1 MG/1
0.1 TABLET ORAL 2 TIMES DAILY PRN
Qty: 180 TABLET | Refills: 0 | Status: SHIPPED | OUTPATIENT
Start: 2024-04-30
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
## 2024-05-13 ENCOUNTER — TELEPHONE (OUTPATIENT)
Dept: PSYCHIATRY | Facility: CLINIC | Age: 34
End: 2024-05-13

## 2024-05-13 NOTE — TELEPHONE ENCOUNTER
PT RETURNED CALL.    I RELAYED PROVIDERS MESSAGE VERBATIM AND SLOWLY TO ENSURE ADEQUATE AND CLEAR UNDERSTANDING DUE TO THE LENGTH OF INFORMATION.    PT OPTED TO SCHEDULE A SOONER APPT FOR 05/15/2024 FOR IN PERSON WITH PROVIDER.    PT STATED SHE WAS GOING TO TRY AND GET A BLOOD TEST DONE SOON TO GET AN ACCURATE DETERMINATION IF SHE IS PREGNANT OR NOT.

## 2024-05-13 NOTE — TELEPHONE ENCOUNTER
The short answer is: it's complicated. We can't study the effects of medications in pregnant women, so we only have partial understanding of the effects of medication in pregnancy. Ultimately, it's a decision she'll have to make based on the benefits of the medication versus the risks that we do know about. It should be considered also that coming off of her medications could lead to her mood worsening and symptoms becoming unmanageable, so this decision can be difficult to make. If she'd like to make an early appointment, she can be scheduled urgently.    Long answer:    Lorazepam - this would be better to discontinue slowly as the fetus can become dependent on this medication; I would recommend doing this slowly by tapering the medication; if she decides to discontinue this medication I can provide further instructions on how to do this    Aripiprazole - it is possible that the infant could have withdrawal symptoms after birth including tremors, trouble feeding and/or breathing, and agitation    Fluoxetine - antidepressants are often used in pregnancy and don't often lead to complications, but known risks are persistent pulmonary hypertension and low birth weight.    Bupropion - antidepressants are often used in pregnancy and don't often lead to complications; animal studies with this medication were reassuring.    Clonidine - animal studies have shown some adverse effects    Prazosin - no known adverse effects

## 2024-05-13 NOTE — TELEPHONE ENCOUNTER
PT(PATIENT) VERIFIED     PT(PATIENT) STATES SHE THINKS SHE MAY BE PREGNANT     PT(PATIENT) STATES SHE TOOK A HOME PREGNANCY TEST, AND THERE IS A FAINT POSITIVE LINE (PT(PATIENT) CONFIRMED THE CONTROL LINE WAS ALSO PRESENT)     PT(PATIENT) IS UNSURE IF SHE SHOULD STOP ANY MEDICATIONS     PT(PATIENT) REQUESTED FOR STAFF TO RETURN CALL  [phone] (DADA BLOCKER WILL , IS A FAMILY FRIEND THAT HELPS HER WITH MESSAGES/APPTS)     PROVIDER PLEASE ADVISE

## 2024-05-13 NOTE — TELEPHONE ENCOUNTER
ATTEMPTED TO CONTACT PT(PATIENT) PER PROVIDER'S INSTRUCTIONS      DADA STATES SHE WILL NOT SEE ALBERTINA AGAIN UNTIL JENN     LEFT MESSAGE WITH INSTRUCTIONS TO RETURN CALL TO OFFICE AT [phone]

## 2024-05-15 ENCOUNTER — OFFICE VISIT (OUTPATIENT)
Dept: PSYCHIATRY | Facility: CLINIC | Age: 34
End: 2024-05-15

## 2024-05-15 ENCOUNTER — HOSPITAL ENCOUNTER (OUTPATIENT)
Dept: CT IMAGING | Facility: HOSPITAL | Age: 34
Discharge: HOME | End: 2024-05-15
Admitting: NURSE PRACTITIONER

## 2024-05-15 ENCOUNTER — OFFICE VISIT (OUTPATIENT)
Dept: FAMILY MEDICINE CLINIC | Facility: CLINIC | Age: 34
End: 2024-05-15

## 2024-05-15 VITALS
WEIGHT: 261.6 LBS | DIASTOLIC BLOOD PRESSURE: 78 MMHG | HEIGHT: 63 IN | HEART RATE: 96 BPM | BODY MASS INDEX: 46.35 KG/M2 | SYSTOLIC BLOOD PRESSURE: 123 MMHG

## 2024-05-15 VITALS
DIASTOLIC BLOOD PRESSURE: 82 MMHG | BODY MASS INDEX: 46.42 KG/M2 | HEIGHT: 63 IN | WEIGHT: 262 LBS | HEART RATE: 96 BPM | OXYGEN SATURATION: 99 % | SYSTOLIC BLOOD PRESSURE: 118 MMHG | TEMPERATURE: 98.5 F

## 2024-05-15 DIAGNOSIS — Z80.0 FAMILY HISTORY OF COLON CANCER: ICD-10-CM

## 2024-05-15 DIAGNOSIS — R11.0 NAUSEA: ICD-10-CM

## 2024-05-15 DIAGNOSIS — F33.41 MAJOR DEPRESSIVE DISORDER, RECURRENT EPISODE, IN PARTIAL REMISSION: Primary | ICD-10-CM

## 2024-05-15 DIAGNOSIS — Z32.00 POSSIBLE PREGNANCY: ICD-10-CM

## 2024-05-15 DIAGNOSIS — F51.5 NIGHTMARES ASSOCIATED WITH CHRONIC POST-TRAUMATIC STRESS DISORDER: ICD-10-CM

## 2024-05-15 DIAGNOSIS — F41.1 GAD (GENERALIZED ANXIETY DISORDER): ICD-10-CM

## 2024-05-15 DIAGNOSIS — R19.7 DIARRHEA, UNSPECIFIED TYPE: ICD-10-CM

## 2024-05-15 DIAGNOSIS — F43.10 POST TRAUMATIC STRESS DISORDER (PTSD): ICD-10-CM

## 2024-05-15 DIAGNOSIS — R10.32 LLQ ABDOMINAL PAIN: ICD-10-CM

## 2024-05-15 DIAGNOSIS — F43.12 NIGHTMARES ASSOCIATED WITH CHRONIC POST-TRAUMATIC STRESS DISORDER: ICD-10-CM

## 2024-05-15 DIAGNOSIS — N92.6 MISSED PERIOD: ICD-10-CM

## 2024-05-15 LAB
B-HCG UR QL: NEGATIVE
BILIRUB BLD-MCNC: NEGATIVE MG/DL
CLARITY, POC: CLEAR
COLOR UR: YELLOW
EXPIRATION DATE: ABNORMAL
EXPIRATION DATE: NORMAL
GLUCOSE UR STRIP-MCNC: NEGATIVE MG/DL
INTERNAL NEGATIVE CONTROL: NORMAL
INTERNAL POSITIVE CONTROL: NORMAL
KETONES UR QL: ABNORMAL
LEUKOCYTE EST, POC: NEGATIVE
Lab: ABNORMAL
Lab: NORMAL
NITRITE UR-MCNC: NEGATIVE MG/ML
PH UR: 5.5 [PH] (ref 5–8)
PROT UR STRIP-MCNC: NEGATIVE MG/DL
RBC # UR STRIP: NEGATIVE /UL
SP GR UR: 1.03 (ref 1–1.03)
UROBILINOGEN UR QL: ABNORMAL

## 2024-05-15 PROCEDURE — 25510000001 IOPAMIDOL PER 1 ML: Performed by: NURSE PRACTITIONER

## 2024-05-15 PROCEDURE — 74177 CT ABD & PELVIS W/CONTRAST: CPT

## 2024-05-15 RX ADMIN — IOPAMIDOL 100 ML: 755 INJECTION, SOLUTION INTRAVENOUS at 19:00
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
## 2024-05-15 NOTE — PROGRESS NOTES
ACUTE VISIT     Patient Name: Karine Swanson  : 1990   MRN: [id number]     Chief Complaint:    Chief Complaint   Patient presents with    Abdominal Pain       History of Present Illness: Karine Swanson is a 33 y.o. female who is here today for abdominal pain-     LLQ pain for 3-4 weeks progressively worse in the past week   Also c/o missed menses.   Urine HCG was negative today in office.  She also states she is having a little bit of nausea and diarrhea 3-4 per day x1 week, thinks this Is just nerves worrying about her coming surgery   Denies vomiting   No recent antibiotic use  Is concerned maternal grand colon cancer causing death  Maternal uncle x2 colon cancer   Scheduled for pursuing a diagnostic laparoscopy to evaluate gynecologic etiologies further  with dr kelly 2024    Lmp- 4/10/24  Pap- 2023        Subjective      Review of Systems:   Review of Systems   Constitutional:  Negative for fever.   Respiratory:  Negative for cough.    Cardiovascular:  Negative for chest pain.   Gastrointestinal:  Positive for abdominal pain, diarrhea and nausea. Negative for blood in stool and vomiting.   Genitourinary:  Positive for pelvic pain. Negative for dysuria.        Past Medical History:   Past Medical History:   Diagnosis Date    Anxiety     Depression     Migraine     Ovarian cyst        Past Surgical History:   Past Surgical History:   Procedure Laterality Date     SECTION N/A 3/25/2022    Procedure:  SECTION PRIMARY;  Surgeon: Avi Kelly MD;  Location: [facility] LABOR DELIVERY;  Service: Gynecology;  Laterality: N/A;    LAPAROSCOPIC CHOLECYSTECTOMY      TONSILLECTOMY         Family History:   Family History   Problem Relation Age of Onset    No Known Problems Father     No Known Problems Mother     No Known Problems Brother     No Known Problems Sister     No Known Problems Paternal Grandfather     No Known Problems Paternal Grandmother     No Known Problems Maternal  Grandmother     No Known Problems Maternal Grandfather     No Known Problems Maternal Aunt     No Known Problems Maternal Uncle     No Known Problems Paternal Aunt     No Known Problems Paternal Uncle     No Known Problems Cousin     No Known Problems Other     ADD / ADHD Neg Hx     Alcohol abuse Neg Hx     Anxiety disorder Neg Hx     Bipolar disorder Neg Hx     Dementia Neg Hx     Depression Neg Hx     Drug abuse Neg Hx     OCD Neg Hx     Paranoid behavior Neg Hx     Schizophrenia Neg Hx     Seizures Neg Hx     Self-Injurious Behavior  Neg Hx     Suicide Attempts Neg Hx     Breast cancer Neg Hx     Ovarian cancer Neg Hx     Uterine cancer Neg Hx     Colon cancer Neg Hx        Social History:   Social History     Socioeconomic History    Marital status:    Tobacco Use    Smoking status: Never     Passive exposure: Never    Smokeless tobacco: Never   Vaping Use    Vaping status: Never Used   Substance and Sexual Activity    Alcohol use: Never    Drug use: Never    Sexual activity: Yes     Partners: Male     Birth control/protection: None, Birth control pill       Medications:     Current Outpatient Medications:     ARIPiprazole (ABILIFY) 2 MG tablet, Take 1 tablet by mouth Every Night., Disp: 90 tablet, Rfl: 0    buPROPion XL (Wellbutrin XL) 150 MG 24 hr tablet, Take 1 tablet by mouth Every Morning. Take along with 300 mg tablet for total daily dose of 450 mg., Disp: 90 tablet, Rfl: 0    buPROPion XL (Wellbutrin XL) 300 MG 24 hr tablet, Take 1 tablet by mouth Every Morning. Take along with 150 mg tablet for a total daily dose of 450 mg., Disp: 90 tablet, Rfl: 0    cloNIDine (Catapres) 0.1 MG tablet, Take 1 tablet by mouth 2 (Two) Times a Day As Needed (anxiety/irritability)., Disp: 180 tablet, Rfl: 0    FLUoxetine (PROzac) 40 MG capsule, Take 2 capsules by mouth Daily., Disp: 180 capsule, Rfl: 0    gabapentin (NEURONTIN) 300 MG capsule, Take 2 capsules by mouth 3 (Three) Times a Day., Disp: 540 capsule, Rfl:  "1    ibuprofen (ADVIL,MOTRIN) 800 MG tablet, Take 1 tablet by mouth Every 8 (Eight) Hours As Needed for Moderate Pain., Disp: 270 tablet, Rfl: 1    LORazepam (ATIVAN) 1 MG tablet, Take 1 tablet by mouth every night at bedtime., Disp: 90 tablet, Rfl: 0    metFORMIN ER (GLUCOPHAGE-XR) 500 MG 24 hr tablet, Take 4 tablets by mouth Daily With Breakfast for 180 days., Disp: 120 tablet, Rfl: 5    norgestimate-ethinyl estradiol (Sprintec 28) 0.25-35 MG-MCG per tablet, Take 1 tablet by mouth Daily., Disp: 28 tablet, Rfl: 12    prazosin (MINIPRESS) 1 MG capsule, Take 1 capsule by mouth Every Night., Disp: 90 capsule, Rfl: 0    SUMAtriptan (IMITREX) 25 MG tablet, Take one tablet at onset of headache. May repeat dose one time in 2 hours if headache not relieved., Disp: 30 tablet, Rfl: 3    tolterodine LA (Detrol LA) 2 MG 24 hr capsule, Take 1 capsule by mouth Daily., Disp: 30 capsule, Rfl: 5    Allergies:   No Known Allergies      Objective     Physical Exam:  Vital Signs:   Vitals:    05/15/24 1346   BP: 118/82   Pulse: 96   Temp: 98.5 °F (36.9 °C)   SpO2: 99%   Weight: 119 kg (262 lb)   Height: 160 cm (63\")     Body mass index is 46.41 kg/m².     Physical Exam  Cardiovascular:      Rate and Rhythm: Normal rate and regular rhythm.      Heart sounds: Normal heart sounds. No murmur heard.  Pulmonary:      Effort: Pulmonary effort is normal.      Breath sounds: Normal breath sounds.   Abdominal:      General: Bowel sounds are normal.      Palpations: Abdomen is soft.      Tenderness: There is abdominal tenderness. There is no right CVA tenderness or left CVA tenderness.      Comments: LLQ moderate tender to palpation   Neurological:      Mental Status: She is alert.           Assessment / Plan      Assessment/Plan:   Diagnoses and all orders for this visit:    1. LLQ abdominal pain  -     CBC Auto Differential  -     Comprehensive Metabolic Panel  -     CT Abdomen Pelvis With & Without Contrast; Future    2. Missed period    3. " Possible pregnancy  -     POCT pregnancy, urine  -     POC Urinalysis Dipstick, Automated    4. Diarrhea, unspecified type  -     Enteric Bacterial Panel - Stool, Per Rectum; Future  -     Enteric Parasite Panel - Stool, Per Rectum; Future  -     Clostridioides difficile Toxin - Stool, Per Rectum  -     CT Abdomen Pelvis With & Without Contrast; Future  -     Celiac Disease Panel; Future    5. Nausea  -     CT Abdomen Pelvis With & Without Contrast; Future    6. Family history of colon cancer           Abdominal pain left lower quadrant pain with nausea and diarrhea will obtain labs stool sample CT of the abdomen and pelvis  Urine pregnancy test negative    Follow Up:   Return if symptoms worsen or fail to improve.    JEREMY Yuan      Please note that portions of this note were completed with a voice recognition program.

## 2024-06-04 ENCOUNTER — OFFICE VISIT (OUTPATIENT)
Dept: PSYCHIATRY | Facility: CLINIC | Age: 34
End: 2024-06-04

## 2024-06-04 VITALS
HEIGHT: 63 IN | HEART RATE: 97 BPM | WEIGHT: 263.8 LBS | SYSTOLIC BLOOD PRESSURE: 117 MMHG | BODY MASS INDEX: 46.74 KG/M2 | DIASTOLIC BLOOD PRESSURE: 76 MMHG

## 2024-06-04 DIAGNOSIS — F33.41 RECURRENT MAJOR DEPRESSIVE DISORDER, IN PARTIAL REMISSION: ICD-10-CM

## 2024-06-04 DIAGNOSIS — F43.10 POST TRAUMATIC STRESS DISORDER (PTSD): Primary | ICD-10-CM

## 2024-06-04 DIAGNOSIS — F41.1 GAD (GENERALIZED ANXIETY DISORDER): ICD-10-CM

## 2024-06-04 RX ORDER — BUPROPION HYDROCHLORIDE 300 MG/1
300 TABLET ORAL EVERY MORNING
Qty: 90 TABLET | Refills: 0 | Status: SHIPPED | OUTPATIENT
Start: 2024-06-04

## 2024-06-04 RX ORDER — LORAZEPAM 1 MG/1
0.5 TABLET ORAL NIGHTLY PRN
Status: SHIPPED
Start: 2024-06-04

## 2024-06-04 RX ORDER — ARIPIPRAZOLE 2 MG/1
2 TABLET ORAL NIGHTLY
Qty: 90 TABLET | Refills: 0 | Status: SHIPPED | OUTPATIENT
Start: 2024-06-04

## 2024-06-04 RX ORDER — FLUOXETINE HYDROCHLORIDE 40 MG/1
80 CAPSULE ORAL DAILY
Qty: 180 CAPSULE | Refills: 0 | Status: SHIPPED | OUTPATIENT
Start: 2024-06-04

## 2024-06-04 RX ORDER — BUPROPION HYDROCHLORIDE 150 MG/1
150 TABLET ORAL EVERY MORNING
Qty: 90 TABLET | Refills: 0 | Status: SHIPPED | OUTPATIENT
Start: 2024-06-04

## 2024-06-04 RX ORDER — PRAZOSIN HYDROCHLORIDE 1 MG/1
1 CAPSULE ORAL NIGHTLY
Qty: 90 CAPSULE | Refills: 0 | Status: SHIPPED | OUTPATIENT
Start: 2024-06-04

## 2024-06-04 NOTE — PRE-PROCEDURE INSTRUCTIONS
PRE-OP INSTRUCTIONS REVIEWED WITH PATIENT: FASTING/BATHING/ARRIVAL PROCEDURES.  INSTRUCTED TO TAKE A.M. DAY OF SURGERY: BUPROPRION, PROZAC, SPRINTEC, DETROL  UNDERSTANDING VERBALIZED.
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
## 2024-06-05 PROCEDURE — S0260 H&P FOR SURGERY: HCPCS | Performed by: OBSTETRICS & GYNECOLOGY

## 2024-06-05 NOTE — H&P
[facility]   Gyn History and Physical    Patient Name: Karine Swanson  : 1990  MRN: [id number]  Primary Care Physician:  Zoë Yang APRN  Date of admission: 2024    Subjective     Chief Complaint: Here for surgery    HPI:  Karine Swanson is a 33 y.o. female who presents for surgical evaluation and management of chronic pelvic pain.  The patient had been having significant pelvic pain for most of her life but particularly worse after the birth of her twins.  She is tried medical therapies and pelvic floor therapy with no significant improvement.  Her pain is mostly with her menstrual cycle although she does have some pain outside the menstrual cycle.  Given her lack of improvement with typical therapies we discussed the option for surgical evaluation with a diagnostic laparoscopy.  We discussed this would allow us to evaluate for common causes of chronic pelvic pain in women such as endometriosis.  After reviewing the risks and benefits the patient wished to proceed with diagnostic laparoscopy.    Review of Systems   All systems were reviewed and negative except for: Pelvic pain, painful menses, abdominal pain, painful intercourse    Personal History     Past Medical History:   Diagnosis Date    Anxiety     Depression     Migraine     Ovarian cyst     Pelvic pain     Pre-diabetes        Past Surgical History:   Procedure Laterality Date     SECTION N/A 3/25/2022    Procedure:  SECTION PRIMARY;  Surgeon: Avi Kelly MD;  Location: [facility] LABOR DELIVERY;  Service: Gynecology;  Laterality: N/A;    LAPAROSCOPIC CHOLECYSTECTOMY      TONSILLECTOMY         Family History: family history includes No Known Problems in her brother, cousin, father, maternal aunt, maternal grandfather, maternal grandmother, maternal uncle, mother, paternal aunt, paternal grandfather, paternal grandmother, paternal uncle, sister, and another family member. Otherwise pertinent FHx was reviewed and  not pertinent to current issue.    Social History:  reports that she has never smoked. She has never been exposed to tobacco smoke. She has never used smokeless tobacco. She reports that she does not drink alcohol and does not use drugs.    Home Medications:  ARIPiprazole, FLUoxetine, LORazepam, SUMAtriptan, buPROPion XL, cloNIDine, ibuprofen, metFORMIN ER, norgestimate-ethinyl estradiol, prazosin, and tolterodine LA    Allergies:  No Known Allergies    Objective     Vitals:   Temp:  [98.7 °F (37.1 °C)] 98.7 °F (37.1 °C)  Heart Rate:  [77] 77  Resp:  [16] 16  BP: (111)/(74) 111/74  Physical Exam   Constitutional: Awake, alert  Eyes: PERRLA, sclerae anicteric, no conjunctival injection  HENT: NCAT, mucous membranes moist  Neck: Supple, no thyromegaly, no lymphadenopathy, trachea midline  Respiratory: Clear to auscultation bilaterally, nonlabored respirations   Cardiovascular: RRR, no murmurs, rubs, or gallops, palpable pedal pulses bilaterally  Gastrointestinal: Positive bowel sounds, soft, nontender, nondistended  Genitourinary: Normal external genitalia, vagina, and cervix.  Musculoskeletal: No bilateral ankle edema, no clubbing or cyanosis to extremities.  The uterus is about 7 cm in size mobile and nontender there are no palpable adnexal masses but there is adnexal tenderness bilaterally.  Psychiatric: Appropriate affect, cooperative  Neurologic: Oriented x 3, strength symmetric in all extremities, speech clear  Skin: No rashes     Result Review:  I have personally reviewed the results from the time of this admission to 6/6/2024 09:43 EDT and agree with these findings:  [x]  Laboratory  []  Microbiology  [x]  Radiology  []  EKG/Telemetry   []  Cardiology/Vascular   [x]  Pathology  [x]  Old records  []  Other:    Assessment / Plan     Assessment:  Active Hospital Problems    Diagnosis     **Pelvic pain      Plan:   The patient has chronic pelvic pain refractory to medical therapy and physical therapy.  We  discussed the option of diagnostic laparoscopy to further evaluate her pelvic pain and specifically for diagnoses such as endometriosis.  The patient desires to proceed.  She understands that I cannot guarantee we will find a definitive etiology of her pelvic pain and she may require further treatment and/or surgery.  The risks, benefits, and alternatives to the procedure have been reviewed the patient.  The risks included, but not limited to, infection, bleeding, hemorrhage, blood transfusion. Injury to nearby structures including: Bowel, bladder, pelvic blood vessels and nerves, ureters, and other nearby structures.  We discussed the risks of anesthesia.  The risks of postoperative complications, such as: Venous thromboembolism, myocardial infarction, stroke, and death.  The patient expressed her understanding of the risks, benefits, and alternatives to the procedure, and wishes to proceed.    Electronically signed by Avi Kelly MD, 06/05/24, 5:03 PM EDT.

## 2024-06-06 ENCOUNTER — HOSPITAL ENCOUNTER (OUTPATIENT)
Facility: HOSPITAL | Age: 34
Setting detail: HOSPITAL OUTPATIENT SURGERY
Discharge: HOME | End: 2024-06-06
Attending: OBSTETRICS & GYNECOLOGY | Admitting: OBSTETRICS & GYNECOLOGY

## 2024-06-06 ENCOUNTER — ANESTHESIA (OUTPATIENT)
Dept: PERIOP | Facility: HOSPITAL | Age: 34
End: 2024-06-06

## 2024-06-06 ENCOUNTER — ANESTHESIA EVENT (OUTPATIENT)
Dept: PERIOP | Facility: HOSPITAL | Age: 34
End: 2024-06-06

## 2024-06-06 VITALS
HEIGHT: 63 IN | BODY MASS INDEX: 46.09 KG/M2 | WEIGHT: 260.14 LBS | RESPIRATION RATE: 18 BRPM | DIASTOLIC BLOOD PRESSURE: 66 MMHG | SYSTOLIC BLOOD PRESSURE: 107 MMHG | OXYGEN SATURATION: 96 % | TEMPERATURE: 97.9 F | HEART RATE: 67 BPM

## 2024-06-06 DIAGNOSIS — R10.2 PELVIC PAIN: ICD-10-CM

## 2024-06-06 DIAGNOSIS — Z98.890 S/P LAPAROSCOPY: Primary | ICD-10-CM

## 2024-06-06 PROBLEM — N73.6 PELVIC ADHESIONS: Status: ACTIVE | Noted: 2024-06-06

## 2024-06-06 LAB
ABO GROUP BLD: NORMAL
ANION GAP SERPL CALCULATED.3IONS-SCNC: 13 MMOL/L (ref 5–15)
BASOPHILS # BLD AUTO: 0.04 10*3/MM3 (ref 0–0.2)
BASOPHILS NFR BLD AUTO: 0.5 % (ref 0–1.5)
BLD GP AB SCN SERPL QL: NEGATIVE
BUN SERPL-MCNC: 11 MG/DL (ref 6–20)
BUN/CREAT SERPL: 15.7 (ref 7–25)
CALCIUM SPEC-SCNC: 9 MG/DL (ref 8.6–10.5)
CHLORIDE SERPL-SCNC: 102 MMOL/L (ref 98–107)
CO2 SERPL-SCNC: 22 MMOL/L (ref 22–29)
CREAT SERPL-MCNC: 0.7 MG/DL (ref 0.57–1)
DEPRECATED RDW RBC AUTO: 46.9 FL (ref 37–54)
EGFRCR SERPLBLD CKD-EPI 2021: 118 ML/MIN/1.73
EOSINOPHIL # BLD AUTO: 0.2 10*3/MM3 (ref 0–0.4)
EOSINOPHIL NFR BLD AUTO: 2.6 % (ref 0.3–6.2)
ERYTHROCYTE [DISTWIDTH] IN BLOOD BY AUTOMATED COUNT: 14.6 % (ref 12.3–15.4)
GLUCOSE SERPL-MCNC: 93 MG/DL (ref 65–99)
HCG INTACT+B SERPL-ACNC: <0.5 MIU/ML
HCT VFR BLD AUTO: 38.5 % (ref 34–46.6)
HGB BLD-MCNC: 12.3 G/DL (ref 12–15.9)
IMM GRANULOCYTES # BLD AUTO: 0.01 10*3/MM3 (ref 0–0.05)
IMM GRANULOCYTES NFR BLD AUTO: 0.1 % (ref 0–0.5)
LYMPHOCYTES # BLD AUTO: 3.06 10*3/MM3 (ref 0.7–3.1)
LYMPHOCYTES NFR BLD AUTO: 40.1 % (ref 19.6–45.3)
MCH RBC QN AUTO: 28.1 PG (ref 26.6–33)
MCHC RBC AUTO-ENTMCNC: 31.9 G/DL (ref 31.5–35.7)
MCV RBC AUTO: 88.1 FL (ref 79–97)
MONOCYTES # BLD AUTO: 0.61 10*3/MM3 (ref 0.1–0.9)
MONOCYTES NFR BLD AUTO: 8 % (ref 5–12)
NEUTROPHILS NFR BLD AUTO: 3.72 10*3/MM3 (ref 1.7–7)
NEUTROPHILS NFR BLD AUTO: 48.7 % (ref 42.7–76)
NRBC BLD AUTO-RTO: 0 /100 WBC (ref 0–0.2)
PLATELET # BLD AUTO: 409 10*3/MM3 (ref 140–450)
PMV BLD AUTO: 9.2 FL (ref 6–12)
POTASSIUM SERPL-SCNC: 4 MMOL/L (ref 3.5–5.2)
RBC # BLD AUTO: 4.37 10*6/MM3 (ref 3.77–5.28)
RH BLD: POSITIVE
SODIUM SERPL-SCNC: 137 MMOL/L (ref 136–145)
T&S EXPIRATION DATE: NORMAL
WBC NRBC COR # BLD AUTO: 7.64 10*3/MM3 (ref 3.4–10.8)

## 2024-06-06 PROCEDURE — 25010000002 FENTANYL CITRATE (PF) 50 MCG/ML SOLUTION: Performed by: NURSE ANESTHETIST, CERTIFIED REGISTERED

## 2024-06-06 PROCEDURE — 25010000002 CEFAZOLIN 3 G RECONSTITUTED SOLUTION 1 EACH VIAL: Performed by: OBSTETRICS & GYNECOLOGY

## 2024-06-06 PROCEDURE — 58660 LAPAROSCOPY LYSIS: CPT | Performed by: OBSTETRICS & GYNECOLOGY

## 2024-06-06 PROCEDURE — 25010000002 DEXAMETHASONE PER 1 MG: Performed by: NURSE ANESTHETIST, CERTIFIED REGISTERED

## 2024-06-06 PROCEDURE — 25810000003 LACTATED RINGERS PER 1000 ML: Performed by: ANESTHESIOLOGY

## 2024-06-06 PROCEDURE — 86850 RBC ANTIBODY SCREEN: CPT | Performed by: OBSTETRICS & GYNECOLOGY

## 2024-06-06 PROCEDURE — 25010000002 PROPOFOL 10 MG/ML EMULSION: Performed by: NURSE ANESTHETIST, CERTIFIED REGISTERED

## 2024-06-06 PROCEDURE — 25010000002 ONDANSETRON PER 1 MG: Performed by: NURSE ANESTHETIST, CERTIFIED REGISTERED

## 2024-06-06 PROCEDURE — 86900 BLOOD TYPING SEROLOGIC ABO: CPT | Performed by: OBSTETRICS & GYNECOLOGY

## 2024-06-06 PROCEDURE — 84702 CHORIONIC GONADOTROPIN TEST: CPT | Performed by: OBSTETRICS & GYNECOLOGY

## 2024-06-06 PROCEDURE — 25010000002 MIDAZOLAM PER 1MG: Performed by: ANESTHESIOLOGY

## 2024-06-06 PROCEDURE — 25010000002 GLYCOPYRROLATE 0.2 MG/ML SOLUTION: Performed by: NURSE ANESTHETIST, CERTIFIED REGISTERED

## 2024-06-06 PROCEDURE — 86901 BLOOD TYPING SEROLOGIC RH(D): CPT | Performed by: OBSTETRICS & GYNECOLOGY

## 2024-06-06 PROCEDURE — 25010000002 HYDROMORPHONE 1 MG/ML SOLUTION: Performed by: NURSE ANESTHETIST, CERTIFIED REGISTERED

## 2024-06-06 PROCEDURE — 85025 COMPLETE CBC W/AUTO DIFF WBC: CPT | Performed by: OBSTETRICS & GYNECOLOGY

## 2024-06-06 PROCEDURE — 80048 BASIC METABOLIC PNL TOTAL CA: CPT | Performed by: OBSTETRICS & GYNECOLOGY

## 2024-06-06 PROCEDURE — C1765 ADHESION BARRIER: HCPCS | Performed by: OBSTETRICS & GYNECOLOGY

## 2024-06-06 DEVICE — ABSORBABLE ADHESION BARRIER
Type: IMPLANTABLE DEVICE | Site: ABDOMEN | Status: FUNCTIONAL
Brand: GYNECARE INTERCEED

## 2024-06-06 RX ORDER — SODIUM CHLORIDE, SODIUM LACTATE, POTASSIUM CHLORIDE, CALCIUM CHLORIDE 600; 310; 30; 20 MG/100ML; MG/100ML; MG/100ML; MG/100ML
125 INJECTION, SOLUTION INTRAVENOUS CONTINUOUS
Status: DISCONTINUED | OUTPATIENT
Start: 2024-06-06 | End: 2024-06-06 | Stop reason: HOSPADM

## 2024-06-06 RX ORDER — DEXMEDETOMIDINE HYDROCHLORIDE 100 UG/ML
INJECTION, SOLUTION INTRAVENOUS AS NEEDED
Status: DISCONTINUED | OUTPATIENT
Start: 2024-06-06 | End: 2024-06-06 | Stop reason: SURG

## 2024-06-06 RX ORDER — ONDANSETRON 2 MG/ML
INJECTION INTRAMUSCULAR; INTRAVENOUS AS NEEDED
Status: DISCONTINUED | OUTPATIENT
Start: 2024-06-06 | End: 2024-06-06 | Stop reason: SURG

## 2024-06-06 RX ORDER — GLYCOPYRROLATE 0.2 MG/ML
INJECTION INTRAMUSCULAR; INTRAVENOUS AS NEEDED
Status: DISCONTINUED | OUTPATIENT
Start: 2024-06-06 | End: 2024-06-06 | Stop reason: SURG

## 2024-06-06 RX ORDER — SODIUM CHLORIDE 9 MG/ML
40 INJECTION, SOLUTION INTRAVENOUS AS NEEDED
Status: DISCONTINUED | OUTPATIENT
Start: 2024-06-06 | End: 2024-06-06 | Stop reason: HOSPADM

## 2024-06-06 RX ORDER — ACETAMINOPHEN 500 MG
1000 TABLET ORAL ONCE
Status: COMPLETED | OUTPATIENT
Start: 2024-06-06 | End: 2024-06-06

## 2024-06-06 RX ORDER — LIDOCAINE HYDROCHLORIDE AND EPINEPHRINE 10; 10 MG/ML; UG/ML
INJECTION, SOLUTION INFILTRATION; PERINEURAL AS NEEDED
Status: DISCONTINUED | OUTPATIENT
Start: 2024-06-06 | End: 2024-06-06 | Stop reason: HOSPADM

## 2024-06-06 RX ORDER — PROPOFOL 10 MG/ML
VIAL (ML) INTRAVENOUS AS NEEDED
Status: DISCONTINUED | OUTPATIENT
Start: 2024-06-06 | End: 2024-06-06 | Stop reason: SURG

## 2024-06-06 RX ORDER — ONDANSETRON 2 MG/ML
4 INJECTION INTRAMUSCULAR; INTRAVENOUS EVERY 6 HOURS PRN
Status: DISCONTINUED | OUTPATIENT
Start: 2024-06-06 | End: 2024-06-06 | Stop reason: HOSPADM

## 2024-06-06 RX ORDER — PHENYLEPHRINE HCL IN 0.9% NACL 1 MG/10 ML
SYRINGE (ML) INTRAVENOUS AS NEEDED
Status: DISCONTINUED | OUTPATIENT
Start: 2024-06-06 | End: 2024-06-06 | Stop reason: SURG

## 2024-06-06 RX ORDER — NALOXONE HYDROCHLORIDE 4 MG/.1ML
SPRAY NASAL
Qty: 2 EACH | Refills: 0 | Status: SHIPPED | OUTPATIENT
Start: 2024-06-06

## 2024-06-06 RX ORDER — PROMETHAZINE HYDROCHLORIDE 12.5 MG/1
25 TABLET ORAL ONCE AS NEEDED
Status: DISCONTINUED | OUTPATIENT
Start: 2024-06-06 | End: 2024-06-06 | Stop reason: HOSPADM

## 2024-06-06 RX ORDER — PROMETHAZINE HYDROCHLORIDE 25 MG/1
25 SUPPOSITORY RECTAL ONCE AS NEEDED
Status: DISCONTINUED | OUTPATIENT
Start: 2024-06-06 | End: 2024-06-06 | Stop reason: HOSPADM

## 2024-06-06 RX ORDER — SODIUM CHLORIDE 0.9 % (FLUSH) 0.9 %
3 SYRINGE (ML) INJECTION EVERY 12 HOURS SCHEDULED
Status: DISCONTINUED | OUTPATIENT
Start: 2024-06-06 | End: 2024-06-06 | Stop reason: HOSPADM

## 2024-06-06 RX ORDER — LIDOCAINE HYDROCHLORIDE 20 MG/ML
INJECTION, SOLUTION EPIDURAL; INFILTRATION; INTRACAUDAL; PERINEURAL AS NEEDED
Status: DISCONTINUED | OUTPATIENT
Start: 2024-06-06 | End: 2024-06-06 | Stop reason: SURG

## 2024-06-06 RX ORDER — MIDAZOLAM HYDROCHLORIDE 2 MG/2ML
2 INJECTION, SOLUTION INTRAMUSCULAR; INTRAVENOUS ONCE
Status: COMPLETED | OUTPATIENT
Start: 2024-06-06 | End: 2024-06-06

## 2024-06-06 RX ORDER — FENTANYL CITRATE 50 UG/ML
INJECTION, SOLUTION INTRAMUSCULAR; INTRAVENOUS AS NEEDED
Status: DISCONTINUED | OUTPATIENT
Start: 2024-06-06 | End: 2024-06-06 | Stop reason: SURG

## 2024-06-06 RX ORDER — MAGNESIUM HYDROXIDE 1200 MG/15ML
LIQUID ORAL AS NEEDED
Status: DISCONTINUED | OUTPATIENT
Start: 2024-06-06 | End: 2024-06-06 | Stop reason: HOSPADM

## 2024-06-06 RX ORDER — SODIUM CHLORIDE 0.9 % (FLUSH) 0.9 %
10 SYRINGE (ML) INJECTION AS NEEDED
Status: DISCONTINUED | OUTPATIENT
Start: 2024-06-06 | End: 2024-06-06 | Stop reason: HOSPADM

## 2024-06-06 RX ORDER — ONDANSETRON 2 MG/ML
4 INJECTION INTRAMUSCULAR; INTRAVENOUS ONCE AS NEEDED
Status: DISCONTINUED | OUTPATIENT
Start: 2024-06-06 | End: 2024-06-06 | Stop reason: HOSPADM

## 2024-06-06 RX ORDER — HYDROCODONE BITARTRATE AND ACETAMINOPHEN 5; 325 MG/1; MG/1
1-2 TABLET ORAL EVERY 6 HOURS PRN
Qty: 16 TABLET | Refills: 0 | Status: SHIPPED | OUTPATIENT
Start: 2024-06-06

## 2024-06-06 RX ORDER — MEPERIDINE HYDROCHLORIDE 25 MG/ML
12.5 INJECTION INTRAMUSCULAR; INTRAVENOUS; SUBCUTANEOUS
Status: DISCONTINUED | OUTPATIENT
Start: 2024-06-06 | End: 2024-06-06 | Stop reason: HOSPADM

## 2024-06-06 RX ORDER — DEXAMETHASONE SODIUM PHOSPHATE 4 MG/ML
INJECTION, SOLUTION INTRA-ARTICULAR; INTRALESIONAL; INTRAMUSCULAR; INTRAVENOUS; SOFT TISSUE AS NEEDED
Status: DISCONTINUED | OUTPATIENT
Start: 2024-06-06 | End: 2024-06-06 | Stop reason: SURG

## 2024-06-06 RX ORDER — SODIUM CHLORIDE, SODIUM LACTATE, POTASSIUM CHLORIDE, CALCIUM CHLORIDE 600; 310; 30; 20 MG/100ML; MG/100ML; MG/100ML; MG/100ML
9 INJECTION, SOLUTION INTRAVENOUS CONTINUOUS PRN
Status: DISCONTINUED | OUTPATIENT
Start: 2024-06-06 | End: 2024-06-06 | Stop reason: HOSPADM

## 2024-06-06 RX ORDER — OXYCODONE HYDROCHLORIDE 5 MG/1
5 TABLET ORAL
Status: COMPLETED | OUTPATIENT
Start: 2024-06-06 | End: 2024-06-06

## 2024-06-06 RX ORDER — ROCURONIUM BROMIDE 10 MG/ML
INJECTION, SOLUTION INTRAVENOUS AS NEEDED
Status: DISCONTINUED | OUTPATIENT
Start: 2024-06-06 | End: 2024-06-06 | Stop reason: SURG

## 2024-06-06 RX ADMIN — Medication 100 MCG: at 11:43

## 2024-06-06 RX ADMIN — DEXMEDETOMIDINE 10 MCG: 100 INJECTION, SOLUTION INTRAVENOUS at 12:00

## 2024-06-06 RX ADMIN — DEXAMETHASONE SODIUM PHOSPHATE 4 MG: 4 INJECTION, SOLUTION INTRAMUSCULAR; INTRAVENOUS at 11:17

## 2024-06-06 RX ADMIN — ACETAMINOPHEN 1000 MG: 500 TABLET ORAL at 09:34

## 2024-06-06 RX ADMIN — Medication 100 MCG: at 11:40

## 2024-06-06 RX ADMIN — SODIUM CHLORIDE, POTASSIUM CHLORIDE, SODIUM LACTATE AND CALCIUM CHLORIDE 9 ML/HR: 600; 310; 30; 20 INJECTION, SOLUTION INTRAVENOUS at 09:34

## 2024-06-06 RX ADMIN — PROPOFOL 200 MG: 10 INJECTION, EMULSION INTRAVENOUS at 11:11

## 2024-06-06 RX ADMIN — FENTANYL CITRATE 50 MCG: 50 INJECTION, SOLUTION INTRAMUSCULAR; INTRAVENOUS at 11:11

## 2024-06-06 RX ADMIN — SODIUM CHLORIDE, POTASSIUM CHLORIDE, SODIUM LACTATE AND CALCIUM CHLORIDE: 600; 310; 30; 20 INJECTION, SOLUTION INTRAVENOUS at 12:24

## 2024-06-06 RX ADMIN — FENTANYL CITRATE 25 MCG: 50 INJECTION, SOLUTION INTRAMUSCULAR; INTRAVENOUS at 11:39

## 2024-06-06 RX ADMIN — DEXMEDETOMIDINE 10 MCG: 100 INJECTION, SOLUTION INTRAVENOUS at 12:25

## 2024-06-06 RX ADMIN — LIDOCAINE HYDROCHLORIDE 80 MG: 20 INJECTION, SOLUTION INTRAVENOUS at 11:11

## 2024-06-06 RX ADMIN — DEXMEDETOMIDINE 10 MCG: 100 INJECTION, SOLUTION INTRAVENOUS at 12:06

## 2024-06-06 RX ADMIN — MIDAZOLAM HYDROCHLORIDE 2 MG: 1 INJECTION, SOLUTION INTRAMUSCULAR; INTRAVENOUS at 10:36

## 2024-06-06 RX ADMIN — OXYCODONE HYDROCHLORIDE 5 MG: 5 TABLET ORAL at 12:56

## 2024-06-06 RX ADMIN — SODIUM CHLORIDE 3000 MG: 9 INJECTION, SOLUTION INTRAVENOUS at 11:14

## 2024-06-06 RX ADMIN — ONDANSETRON HYDROCHLORIDE 4 MG: 2 SOLUTION INTRAMUSCULAR; INTRAVENOUS at 11:17

## 2024-06-06 RX ADMIN — HYDROMORPHONE HYDROCHLORIDE 0.5 MG: 1 INJECTION, SOLUTION INTRAMUSCULAR; INTRAVENOUS; SUBCUTANEOUS at 12:43

## 2024-06-06 RX ADMIN — ROCURONIUM BROMIDE 50 MG: 10 INJECTION, SOLUTION INTRAVENOUS at 11:11

## 2024-06-06 RX ADMIN — GLYCOPYRROLATE 0.2 MG: 0.2 INJECTION INTRAMUSCULAR; INTRAVENOUS at 11:41

## 2024-06-06 RX ADMIN — HYDROMORPHONE HYDROCHLORIDE 0.5 MG: 1 INJECTION, SOLUTION INTRAMUSCULAR; INTRAVENOUS; SUBCUTANEOUS at 12:51

## 2024-06-06 RX ADMIN — OXYCODONE HYDROCHLORIDE 5 MG: 5 TABLET ORAL at 14:15

## 2024-06-06 RX ADMIN — DEXMEDETOMIDINE 10 MCG: 100 INJECTION, SOLUTION INTRAVENOUS at 11:56

## 2024-06-06 RX ADMIN — DEXMEDETOMIDINE 10 MCG: 100 INJECTION, SOLUTION INTRAVENOUS at 11:21

## 2024-06-06 RX ADMIN — FENTANYL CITRATE 25 MCG: 50 INJECTION, SOLUTION INTRAMUSCULAR; INTRAVENOUS at 11:35

## 2024-06-06 NOTE — DISCHARGE INSTRUCTIONS
DISCHARGE INSTRUCTIONS  GYNECOLOGICAL  PROCEDURES      For your surgery you had:  General anesthesia (you may have a sore throat for the first 24 hours)    You received a medicated patch for nausea prevention today (behind your ear). It is recommended that you remove it 24-48 hours post-operatively. It must be removed within 72 hours.  You received an anesthesia medication today that can cause hormonal forms of birth control to be ineffective. You should use a different form of birth control (to prevent pregnancy) for 7 days.   You may experience dizziness, drowsiness, or lightheadedness for several hours following surgery.  Do not stay alone today or tonight.  Limit your activity for 24 hours.  Resume your diet slowly.  Follow any special dietary instructions you may have been given by your doctor.  You should not drive or operate machinery, drink alcohol, or sign legally binding documents for 24 hours or while you are taking pain medication.    Last dose of pain medication was given at:     0934AM 1000mg of Tylenol given,  do not exceed 4000mg in 24 hours.  Dilaudid given at 1243PM and 1251PM.    NOTIFY YOUR DOCTOR IF YOU EXPERIENCE ANY OF THE FOLLOWING:  Temperature greater than 101 degrees Fahrenheit  Shaking Chills  Redness or excessive drainage from incision  Nausea, vomiting and/or pain that is not controlled by prescribed medications  Increase in bleeding or bleeding that is excessive  Unable to urinate in 6 hours after surgery  If unable to reach your doctor, please go to the closest Emergency Room     [x] Nothing in the vagina for 2 weeks to include intercourse, douches, or tampons.  [x] Vaginal bleeding may be expected for several days with flow decreasing with time and never any heavier than a normal   period.  If you have an ablation, vaginal discharge is expected after bleeding stops.   If you have foul smelling discharge, notify your physician.  Medications per physician instructions as indicated  on Discharge Medication Information Sheet.      SPECIAL INSTRUCTIONS:   Check incisions daily for redness and swelling. Call MD for fever over 100 degrees or pain that is uncontrolled.            No driving while taking narcotic pain medications  No lifting more than 15 to 20 pounds for 1 to 2 weeks  No intercourse until follow-up  Keep the incisions clean and dry  Remove outer bandage 24 hours after surgery  Remove inner bandage/Steri-Strips 1 week from surgery  Call for temperature greater than 100 °F, shortness of breath or chest pain, heavy vaginal bleeding soaking a pad in less than 1 hour, redness swelling or drainage from the incisions, excessive nausea or vomiting, or pain that is worsening despite current pain medications

## 2024-06-06 NOTE — OP NOTE
[facility]  DIAGNOSTIC LAPAROSCOPY  Procedure Report    Patient Name:  Karine Swanson  YOB: 1990  MRN: [id number]    Date of Surgery:  6/6/2024     Pre-op Diagnosis:   Pelvic pain [R10.2]       Post-Op Diagnosis Codes:     * Pelvic pain [R10.2]     * Pelvic adhesions [N73.6]    Procedure(s):  DIAGNOSTIC LAPAROSCOPY, LYSIS OF ADHESIONS    Staff:  Surgeon(s):  Avi Kelly MD       Anesthesia: General    Estimated Blood Loss: 25 mL    Specimen:          None    Findings: There were extensive pelvic adhesions throughout the lower abdomen and pelvis.  There was omentum adhered to the anterior abdominal wall all the way from just below the umbilicus to the posterior cul-de-sac.  There were adhesions along the left side of the pelvis obscuring the left pelvic sidewall left fallopian tube and ovary.  These adhesions were adherent along the left round ligament and posterior surface of the uterus on the left side completely obscuring the posterior cul-de-sac.  The uterus was grossly normal in size and shape.  The right fallopian tube and ovary appeared normal.  Once lysis of adhesions have been performed and the left tube and ovary were visualized they appeared normal.  The fallopian tubes and ovaries were free and not adhered to the pelvic sidewalls or uterus.  Behind the adhesions of the sigmoid colon to the posterior uterus the remainder of the posterior cul-de-sac was free.  No evidence of endometriosis was seen.    Complications: None    Description of Procedure: After reviewing the informed consent including the risks benefits and alternatives to the procedure, the patient expressed her understanding and desire to proceed.  She was taken to the operating room with an IV in place and running.  She was placed on the operating table in the dorsal supine position.  The patient was given a general anesthetic, and intubated endotracheally.  The patient was then repositioned into the dorsal  lithotomy position.  Her arms were tucked to the side, and her legs were placed in yellowfin stirrups.  We took care in positioning both the arms and legs, padding any potential pressure points.  The bladder was drained in a sterile fashion with an in and out catheterization. After a surgical time-out was performed, the patient was prepped and draped in the usual sterile fashion.  I inserted a Mallory retractor into the posterior vagina, and a Radha retractor and the anterior vagina.  I used a single-tooth tenaculum grasped the anterior lip of the cervix, and a Hulka tenaculum was placed without difficulty.  The single-tooth tenaculum was removed.  The Mallory and [place] retractors were removed.  I then changed my gloves, and proceeded to the abdomen.    After ensuring the patient was flat, and had an orogastric tube in place, I infiltrated in the planned incision sites with a total of 10mL of half percent Marcaine with epinephrine.  I made a vertical infraumbilical skin incision with the scalpel.  With anterior traction on the anterior abdominal wall I inserted the Veress needle into the peritoneal cavity without difficulty.  Free-flowing saline through the Veress needle confirmed my intraperitoneal location.  Opening pressures were less than 7 mm of mercury.  The abdomen was insufflated to 25 mm of mercury. I then used a 5 mm Optiview trocar to enter the peritoneal cavity under direct visualization.      I then surveyed the area below the trocar insertion site, and there was no evidence of any bowel or vascular injury.  At this time I placed a left lower quadrant trocar and right lower quadrant trocar, under direct visualization.  The intra-abdominal pressure were reduced to 15 mm of mercury.  The patient was placed in Trendelenburg position, and the bowel was evacuated out of the pelvis with a blunt probe.    here were extensive pelvic adhesions throughout the lower abdomen and pelvis.  There was omentum adhered to  the anterior abdominal wall all the way from just below the umbilicus to the posterior cul-de-sac.  There were adhesions along the left side of the pelvis obscuring the left pelvic sidewall left fallopian tube and ovary.  These adhesions were adherent along the left round ligament and posterior surface of the uterus on the left side completely obscuring the posterior cul-de-sac.  The uterus was grossly normal in size and shape.  The right fallopian tube and ovary appeared normal.  At this time, I could not identify the left fallopian tube and ovary.  I called for the LigaSure device.  Starting with the adhesions of the omentum to the anterior abdominal wall carefully began to work from the most superior aspect of these adhesions.  I carefully inspected the adhesions and noted no bowel within these adhesions.  Using the LigaSure device I took these adhesions down from the anterior abdominal wall.  I carefully worked from the umbilicus down into the pelvis.  Once I had the majority the omentum off of the anterior abdominal wall I could better see the adhesions of omentum to the left pelvic sidewall, and then was able to identify that it was the sigmoid colon and sigmoid colon epiploica that was adhered to the left pelvic sidewall left round ligament and the left posterior aspect of the uterus obscuring the posterior cul-de-sac.  This mostly look like epiploica as I could see colon wall pretty readily laying to the right side of the pelvis.  I called for the laparoscopic Metzenbaum scissors.  I started again with the most superior/cephalad portion of the adhesions and took these off of the left side of the pelvis and left round ligament.  As I did this, I was able to see the left tube and ovary, which appeared to be normal.  At this point I was only left with the adhesions to the left and posterior aspect of the uterus.  Again, using the Metzenbaum scissors and no energy I carefully used both blunt and sharp dissection  to take these adhesions down.  Again as I took these adhesions down I felt I was well away from colon wall and definitely stayed towards the uterine side as I took these adhesions down completely.  At this point, I had complete visualization of the posterior cul-de-sac of the uterus was completely free.  I think the adhesion had caused some retraction of the left round ligament.  I used the LigaSure device to free the peritoneum around the round ligament taking some tension off of the uterus.  I used the cautery function of the Metzenbaum scissors to cauterize 3 small areas that were slightly oozing on the posterior surface of the uterus or more at taken down the adhesions.  I did the same on the anterior abdominal wall in the right lower pelvis and an area that was slightly oozing.    At this point, I copiously irrigated the pelvis and took representative photographs.  I closely inspected the pelvis and noted no further adhesions or endometriosis.  I closely inspected for hemostasis.  All of the pedicles that were taken down were hemostatic.  I called for a piece of Interceed.  I placed the Interceed across the uterus slightly to the left side just inferior to the fundus.  I then wrapped this around the fundus of the uterus along the posterior aspect of the uterus and left side where the previous colon adhesions have been.  The adhesions on this procedure were extensive, and the lysis of the adhesions were the entirety of this procedure.    This completed the my procedure.  The left lower quadrant and right lower quadrant trocars were removed under direct visualization.  The camera was removed, and the pneumoperitoneum was evacuated from the patient's abdomen.  Once the pneumoperitoneum was evacuated, the trocar cannula was removed.  The skin sites were then reapproximated with simple, interrupted, subcuticular 4-0 Monocryl sutures.  The incisions were then reinforced with Mastisol and Steri-Strips, and covered  with a coverlet.  The Hulka tenaculum was removed, and the tenaculum site was noted to be hemostatic.  The patient was taken out of lithotomy position, awoken from her general anesthesia, and taken to the recovery room in satisfactory condition.  All counts were correct x2, and the patient received Kefzol as her preoperative antibiotics.  The patient will be discharged home which she meets PACU criteria.  The patient will follow up with me in approximately 2 weeks, and this appointment will be made for her prior to discharge.  For discharge medications please see the medication reconciliation sheet.  The patient was instructed to call the office for any of the following: Temperature greater than 100°F, shortness of breath or chest pain, pain that is worsening despite current pain medications, excessive nausea or vomiting, redness swelling or drainage from the incisions, heavy vaginal bleeding, or other concerns.        Avi Kelly MD     Date: 6/6/2024  Time: 12:28 EDT

## 2024-06-06 NOTE — ANESTHESIA PREPROCEDURE EVALUATION
Anesthesia Evaluation     Patient summary reviewed and Nursing notes reviewed   no history of anesthetic complications:   NPO Solid Status: > 8 hours  NPO Liquid Status: > 2 hours           Airway   Mallampati: II  TM distance: >3 FB  Neck ROM: full  No difficulty expected  Dental    (+) upper dentures and lower dentures    Pulmonary - negative pulmonary ROS and normal exam    breath sounds clear to auscultation  Cardiovascular - normal exam  Exercise tolerance: good (4-7 METS)    Rhythm: regular  Rate: normal    (+) hyperlipidemia      Neuro/Psych  (+) headaches, numbness, psychiatric history  GI/Hepatic/Renal/Endo - negative ROS     Musculoskeletal (-) negative ROS    Abdominal    Substance History - negative use     OB/GYN negative ob/gyn ROS         Other - negative ROS       ROS/Med Hx Other: PAT Nursing Notes unavailable.               Anesthesia Plan    ASA 3     general     (Patient understands anesthesia not responsible for dental damage.)  intravenous induction     Anesthetic plan, risks, benefits, and alternatives have been provided, discussed and informed consent has been obtained with: patient.    Use of blood products discussed with patient .    Plan discussed with CRNA.    CODE STATUS:

## 2024-06-06 NOTE — ANESTHESIA POSTPROCEDURE EVALUATION
Patient: Karine Swanson    Procedure Summary       Date: 06/06/24 Room / Location: [facility] OR 05 / [facility] MAIN OR    Anesthesia Start: 1106 Anesthesia Stop: 1238    Procedure: DIAGNOSTIC LAPAROSCOPY, LYSIS OF ADHESIONS (Abdomen) Diagnosis:       Pelvic pain      Pelvic adhesions      (Pelvic pain [R10.2])    Surgeons: Avi Kelly MD Provider: Ish Bhardwaj MD    Anesthesia Type: general ASA Status: 3            Anesthesia Type: general    Vitals  Vitals Value Taken Time   /68 06/06/24 1236   Temp     Pulse 85 06/06/24 1239   Resp     SpO2 95 % 06/06/24 1239   Vitals shown include unfiled device data.        Post Anesthesia Care and Evaluation    Patient location during evaluation: bedside  Patient participation: complete - patient participated  Level of consciousness: awake    Airway patency: patent  PONV Status: none  Cardiovascular status: acceptable  Respiratory status: acceptable  Hydration status: acceptable

## 2024-06-19 ENCOUNTER — OFFICE VISIT (OUTPATIENT)
Dept: OBSTETRICS AND GYNECOLOGY | Facility: CLINIC | Age: 34
End: 2024-06-19

## 2024-06-19 VITALS
WEIGHT: 261 LBS | BODY MASS INDEX: 46.25 KG/M2 | DIASTOLIC BLOOD PRESSURE: 79 MMHG | SYSTOLIC BLOOD PRESSURE: 114 MMHG | HEART RATE: 81 BPM | HEIGHT: 63 IN

## 2024-06-19 DIAGNOSIS — N73.6 PELVIC ADHESIONS: ICD-10-CM

## 2024-06-19 DIAGNOSIS — F41.1 GAD (GENERALIZED ANXIETY DISORDER): ICD-10-CM

## 2024-06-19 DIAGNOSIS — M54.41 CHRONIC BILATERAL LOW BACK PAIN WITH RIGHT-SIDED SCIATICA: ICD-10-CM

## 2024-06-19 DIAGNOSIS — E66.01 MORBID (SEVERE) OBESITY DUE TO EXCESS CALORIES: ICD-10-CM

## 2024-06-19 DIAGNOSIS — G89.29 CHRONIC BILATERAL LOW BACK PAIN WITH RIGHT-SIDED SCIATICA: ICD-10-CM

## 2024-06-19 DIAGNOSIS — Z48.89 POSTOPERATIVE VISIT: Primary | ICD-10-CM

## 2024-06-19 RX ORDER — LORAZEPAM 1 MG/1
1 TABLET ORAL
Qty: 90 TABLET | Refills: 0 | Status: SHIPPED | OUTPATIENT
Start: 2024-06-19

## 2024-06-19 RX ORDER — METFORMIN HYDROCHLORIDE 500 MG/1
TABLET, EXTENDED RELEASE ORAL
Qty: 120 TABLET | Refills: 5 | Status: SHIPPED | OUTPATIENT
Start: 2024-06-19

## 2024-06-19 RX ORDER — TOLTERODINE 2 MG/1
2 CAPSULE, EXTENDED RELEASE ORAL DAILY
Qty: 30 CAPSULE | Refills: 5 | Status: SHIPPED | OUTPATIENT
Start: 2024-06-19

## 2024-06-19 RX ORDER — GABAPENTIN 300 MG/1
600 CAPSULE ORAL 3 TIMES DAILY
Qty: 540 CAPSULE | Refills: 1 | OUTPATIENT
Start: 2024-06-19

## 2024-06-19 NOTE — PROGRESS NOTES
"[facility]  Post Operative Visit      CC: Post operative follow up    Subjective:   Pain:  No  Vaginal bleeding:  No  Vaginal discharge:  No  Fever/chills:  No  Good appetite:  Yes  Normal bladder function:  Yes  Normal bowel function:  Yes  Hot flashes: No  Doing well.    /79   Pulse 81   Ht 160 cm (63\")   Wt 118 kg (261 lb)   LMP 05/13/2024   Breastfeeding No   BMI 46.23 kg/m²     Physical Exam  Vitals and nursing note reviewed.   Constitutional:       General: She is not in acute distress.     Appearance: Normal appearance. She is not ill-appearing.   HENT:      Head: Normocephalic and atraumatic.   Abdominal:      General: Abdomen is flat. There is no distension.      Palpations: Abdomen is soft. There is no mass.      Tenderness: There is no abdominal tenderness. There is no guarding or rebound.      Hernia: No hernia is present.      Comments: The incisions are clean, dry, intact and well-healing.  There are no signs of infection around the incisions.   Musculoskeletal:         General: No swelling.      Right lower leg: No edema.      Left lower leg: No edema.   Neurological:      Mental Status: She is alert and oriented to person, place, and time.   Psychiatric:         Mood and Affect: Mood normal.         Behavior: Behavior normal.         Thought Content: Thought content normal.         Judgment: Judgment normal.       Operative report, surgical findings and any pathology reviewed.    Assessment and Plan:  Diagnoses and all orders for this visit:    1. Postoperative visit (Primary)  Assessment & Plan:  Stable postoperative course  Continue OTC NSAIDs as needed for further postoperative pain  May resume normal activities      2. Pelvic adhesions  Assessment & Plan:  We discussed the presence of the pelvic adhesions at time of her procedure.  Given the significance of the adhesions I do suspect they are likely at least contributing to the patient's pain.  We discussed etiology " of pelvic adhesions including things like pelvic surgery and pelvic and/or abdominal infection.  We discussed that these pelvic adhesions could recur we will need to monitor for further symptoms.           Any available photos and/or pathology were reviewed.  All questions answered.   Ok to resume normal activities  Ok to resume intercourse  Return to school/work without limitations    Follow Up:  Return for Annual physical.    Avi Kelly MD  06/19/2024

## 2024-06-19 NOTE — TELEPHONE ENCOUNTER
Last OV:05/15/2024  Last UDS: 02/07/2024    *Gabapentin was stopped by another provider's RN on 06/04/2024

## 2024-06-19 NOTE — TELEPHONE ENCOUNTER
CONTROLLED MEDICATION REFILL REQUEST    STATE REGULATION APPT EVERY 3 MONTHS     UDS(URINE DRUG SCREEN) EVERY 6 MONTHS OR UP TO PROVIDER PREFERENCE      NEW NARC CONSENT EVERY YEAR      MEDICATION: LORazepam (ATIVAN) 1 MG tablet (06/04/2024)      NEXT OFFICE VISIT: Appointment with Ancelmo Guzman MD (08/05/2024)     LAST OFFICE VISIT: Office Visit with Ancelmo Guzman MD (06/04/2024)     NARC CONSENT: NONE IN EPIC     URINE DRUG SCREEN(STANDING ORDER): POC Urine Drug Screen [facility] (02/07/2024 12:24)     PROVIDER PLEASE ADVISE

## 2024-06-20 PROBLEM — Z30.09 BIRTH CONTROL COUNSELING: Status: RESOLVED | Noted: 2024-04-22 | Resolved: 2024-06-20

## 2024-06-20 PROBLEM — R10.2 PELVIC PAIN: Status: RESOLVED | Noted: 2024-02-01 | Resolved: 2024-06-20

## 2024-06-20 PROBLEM — Z48.89 POSTOPERATIVE VISIT: Status: ACTIVE | Noted: 2024-06-20

## 2024-06-20 NOTE — ASSESSMENT & PLAN NOTE
We discussed the presence of the pelvic adhesions at time of her procedure.  Given the significance of the adhesions I do suspect they are likely at least contributing to the patient's pain.  We discussed etiology of pelvic adhesions including things like pelvic surgery and pelvic and/or abdominal infection.  We discussed that these pelvic adhesions could recur we will need to monitor for further symptoms.

## 2024-06-20 NOTE — ASSESSMENT & PLAN NOTE
Stable postoperative course  Continue OTC NSAIDs as needed for further postoperative pain  May resume normal activities

## 2024-07-11 NOTE — PROGRESS NOTES
Follow Up Office Visit      Patient Name: Karine Swanson  : 1990   MRN: [id number]     Chief Complaint:    Chief Complaint   Patient presents with    Anxiety    Depression    Insomnia       History of Present Illness: Karine Swanson is a 34 y.o. female who is here today to follow up for hyperlipidemia, anxiety, depression, IFG, obesity.    Labs-2024  Pap-2023  Mammogram- 2023    Follow up S/P laparoscopy 2024  Follow up per gyn dr munoz per progress note 2024  2. Pelvic adhesions  Assessment & Plan:  We discussed the presence of the pelvic adhesions at time of her procedure.  Given the significance of the adhesions I do suspect they are likely at least contributing to the patient's pain.  We discussed etiology of pelvic adhesions including things like pelvic surgery and pelvic and/or abdominal infection.  We discussed that these pelvic adhesions could recur we will need to monitor for further symptoms.      She says she is doing better than she has in a long time.      Low back pain still bothers her x-ray and MRI negative patient reports gabapentin not helping stopped taking, stopped physical therapy due to cost, she is still self pay    Migraines less than 6 per  month  Same presentation     Psychiatry currently dr alvarez feels anxiety and depression controlled with prozac and wellbutrin       Subjective      Review of Systems:   Review of Systems   Constitutional:  Negative for fever.   Respiratory:  Negative for cough.    Cardiovascular:  Negative for chest pain.   Gastrointestinal:  Negative for abdominal pain, constipation, diarrhea, nausea and vomiting.   Genitourinary:  Negative for dysuria.   Musculoskeletal:  Positive for back pain.   Neurological:  Negative for numbness.        Past Medical History:   Past Medical History:   Diagnosis Date    Anxiety     Depression     Migraine     Ovarian cyst     Pelvic pain     Pre-diabetes        Past Surgical History:   Past Surgical History:    Procedure Laterality Date     SECTION N/A 3/25/2022    Procedure:  SECTION PRIMARY;  Surgeon: Avi Kelly MD;  Location: [facility] LABOR DELIVERY;  Service: Gynecology;  Laterality: N/A;    DIAGNOSTIC LAPAROSCOPY N/A 2024    Procedure: DIAGNOSTIC LAPAROSCOPY, LYSIS OF ADHESIONS;  Surgeon: Avi Kelly MD;  Location: [facility] MAIN OR;  Service: Gynecology;  Laterality: N/A;    LAPAROSCOPIC CHOLECYSTECTOMY      TONSILLECTOMY         Family History:   Family History   Problem Relation Age of Onset    No Known Problems Mother     No Known Problems Father     No Known Problems Sister     No Known Problems Brother     No Known Problems Maternal Aunt     No Known Problems Maternal Uncle     No Known Problems Paternal Aunt     No Known Problems Paternal Uncle     No Known Problems Maternal Grandmother     No Known Problems Maternal Grandfather     No Known Problems Paternal Grandmother     No Known Problems Paternal Grandfather     No Known Problems Cousin     No Known Problems Other     ADD / ADHD Neg Hx     Alcohol abuse Neg Hx     Anxiety disorder Neg Hx     Bipolar disorder Neg Hx     Dementia Neg Hx     Depression Neg Hx     Drug abuse Neg Hx     OCD Neg Hx     Paranoid behavior Neg Hx     Schizophrenia Neg Hx     Seizures Neg Hx     Self-Injurious Behavior  Neg Hx     Suicide Attempts Neg Hx     Breast cancer Neg Hx     Ovarian cancer Neg Hx     Uterine cancer Neg Hx     Colon cancer Neg Hx     Malig Hyperthermia Neg Hx        Social History:   Social History     Socioeconomic History    Marital status:    Tobacco Use    Smoking status: Never     Passive exposure: Never    Smokeless tobacco: Never   Vaping Use    Vaping status: Never Used   Substance and Sexual Activity    Alcohol use: Never    Drug use: Never    Sexual activity: Defer     Partners: Male     Birth control/protection: None, Birth control pill       Medications:     Current Outpatient Medications:      "buPROPion XL (Wellbutrin XL) 300 MG 24 hr tablet, Take 1 tablet by mouth Every Morning. Take along with 150 mg tablet for a total daily dose of 450 mg., Disp: 90 tablet, Rfl: 0    prazosin (MINIPRESS) 1 MG capsule, Take 1 capsule by mouth Every Night., Disp: 90 capsule, Rfl: 0    SUMAtriptan (IMITREX) 25 MG tablet, Take one tablet at onset of headache. May repeat dose one time in 2 hours if headache not relieved., Disp: 30 tablet, Rfl: 3    tolterodine LA (DETROL LA) 2 MG 24 hr capsule, Take 1 capsule by mouth Daily., Disp: 30 capsule, Rfl: 5    FLUoxetine (PROzac) 40 MG capsule, Take 2 capsules by mouth Daily. (Patient not taking: Reported on 7/17/2024), Disp: 180 capsule, Rfl: 0    meloxicam (Mobic) 15 MG tablet, Take 1 tablet by mouth Daily., Disp: 30 tablet, Rfl: 2    Allergies:   No Known Allergies            Objective     Physical Exam:  Vital Signs:   Vitals:    07/17/24 1525   BP: 120/85   Pulse: 85   Temp: 98.5 °F (36.9 °C)   SpO2: 97%   Weight: 119 kg (263 lb)   Height: 160 cm (63\")     Body mass index is 46.59 kg/m².           Physical Exam  Constitutional:       Appearance: She is obese.   HENT:      Nose: Nose normal.      Mouth/Throat:      Mouth: Mucous membranes are moist.   Eyes:      Conjunctiva/sclera: Conjunctivae normal.   Neck:      Vascular: No carotid bruit.   Cardiovascular:      Rate and Rhythm: Normal rate and regular rhythm.      Heart sounds: Normal heart sounds. No murmur heard.  Pulmonary:      Effort: Pulmonary effort is normal.      Breath sounds: Normal breath sounds.   Abdominal:      General: Bowel sounds are normal.      Palpations: Abdomen is soft.   Musculoskeletal:      Right lower leg: No edema.      Left lower leg: No edema.   Skin:     General: Skin is warm and dry.   Neurological:      Mental Status: She is alert.   Psychiatric:         Mood and Affect: Mood normal.         Behavior: Behavior normal.             Assessment / Plan      Assessment/Plan:   Diagnoses and all " "orders for this visit:    1. Mixed hyperlipidemia (Primary)    2. Impaired fasting blood sugar    3. Chronic bilateral low back pain with right-sided sciatica    4. Overactive bladder    5. Recurrent major depressive disorder, in partial remission    6. Class 3 severe obesity due to excess calories with serious comorbidity and body mass index (BMI) of 45.0 to 49.9 in adult    7. Migraine with aura and without status migrainosus, not intractable  -     SUMAtriptan (IMITREX) 25 MG tablet; Take one tablet at onset of headache. May repeat dose one time in 2 hours if headache not relieved.  Dispense: 30 tablet; Refill: 3    Other orders  -     tolterodine LA (DETROL LA) 2 MG 24 hr capsule; Take 1 capsule by mouth Daily.  Dispense: 30 capsule; Refill: 5  -     meloxicam (Mobic) 15 MG tablet; Take 1 tablet by mouth Daily.  Dispense: 30 tablet; Refill: 2         Hyperlipidemia recommend cutting out fried foods red meat pork products cheese increasing fruits vegetables whole grains increasing exercise of 30 minutes daily  Impaired fasting glucose recommend reduce added carbs sugars exercise 30 minutes daily weight loss  Chronic low back pain gabapentin not helping had to stop physical therapy due to the cost will start meloxicam recommend take with food  Overactive bladder currently controlled Detrol LA denies dysuria will provide refill  Depression currently managed by psychiatry stable on Wellbutrin and fluoxetine  Migraines less than 14 days/month Imitrex abortive therapy resolves migraines  Class III obesity recommend reducing overall caloric intake and increasing exercise patient is self-pay at this time      Follow Up:   Return in about 6 months (around 1/17/2025).    Dayana Yang, JEREMY    \"Please note that portions of this note were completed with a voice recognition program.\"    "

## 2024-07-17 ENCOUNTER — OFFICE VISIT (OUTPATIENT)
Dept: FAMILY MEDICINE CLINIC | Facility: CLINIC | Age: 34
End: 2024-07-17

## 2024-07-17 VITALS
HEART RATE: 85 BPM | SYSTOLIC BLOOD PRESSURE: 120 MMHG | DIASTOLIC BLOOD PRESSURE: 85 MMHG | HEIGHT: 63 IN | TEMPERATURE: 98.5 F | WEIGHT: 263 LBS | BODY MASS INDEX: 46.6 KG/M2 | OXYGEN SATURATION: 97 %

## 2024-07-17 DIAGNOSIS — E78.2 MIXED HYPERLIPIDEMIA: Primary | ICD-10-CM

## 2024-07-17 DIAGNOSIS — G89.29 CHRONIC BILATERAL LOW BACK PAIN WITH RIGHT-SIDED SCIATICA: ICD-10-CM

## 2024-07-17 DIAGNOSIS — F33.41 RECURRENT MAJOR DEPRESSIVE DISORDER, IN PARTIAL REMISSION: ICD-10-CM

## 2024-07-17 DIAGNOSIS — R73.01 IMPAIRED FASTING BLOOD SUGAR: ICD-10-CM

## 2024-07-17 DIAGNOSIS — G43.109 MIGRAINE WITH AURA AND WITHOUT STATUS MIGRAINOSUS, NOT INTRACTABLE: ICD-10-CM

## 2024-07-17 DIAGNOSIS — M54.41 CHRONIC BILATERAL LOW BACK PAIN WITH RIGHT-SIDED SCIATICA: ICD-10-CM

## 2024-07-17 DIAGNOSIS — E66.01 CLASS 3 SEVERE OBESITY DUE TO EXCESS CALORIES WITH SERIOUS COMORBIDITY AND BODY MASS INDEX (BMI) OF 45.0 TO 49.9 IN ADULT: ICD-10-CM

## 2024-07-17 DIAGNOSIS — N32.81 OVERACTIVE BLADDER: ICD-10-CM

## 2024-07-17 PROBLEM — E66.813 CLASS 3 SEVERE OBESITY DUE TO EXCESS CALORIES WITH SERIOUS COMORBIDITY AND BODY MASS INDEX (BMI) OF 45.0 TO 49.9 IN ADULT: Status: ACTIVE | Noted: 2023-11-01

## 2024-07-17 RX ORDER — TOLTERODINE 2 MG/1
2 CAPSULE, EXTENDED RELEASE ORAL DAILY
Qty: 30 CAPSULE | Refills: 5 | Status: SHIPPED | OUTPATIENT
Start: 2024-07-17

## 2024-07-17 RX ORDER — GABAPENTIN 300 MG/1
2 CAPSULE ORAL 3 TIMES DAILY
COMMUNITY
Start: 2024-06-20 | End: 2024-07-17

## 2024-07-17 RX ORDER — SUMATRIPTAN 25 MG/1
TABLET, FILM COATED ORAL
Qty: 30 TABLET | Refills: 3 | Status: SHIPPED | OUTPATIENT
Start: 2024-07-17

## 2024-07-17 RX ORDER — MELOXICAM 15 MG/1
15 TABLET ORAL DAILY
Qty: 30 TABLET | Refills: 2 | Status: SHIPPED | OUTPATIENT
Start: 2024-07-17

## 2024-08-05 ENCOUNTER — OFFICE VISIT (OUTPATIENT)
Dept: PSYCHIATRY | Facility: CLINIC | Age: 34
End: 2024-08-05
Payer: MEDICAID

## 2024-08-05 VITALS
SYSTOLIC BLOOD PRESSURE: 138 MMHG | BODY MASS INDEX: 46.6 KG/M2 | WEIGHT: 263 LBS | DIASTOLIC BLOOD PRESSURE: 78 MMHG | HEIGHT: 63 IN | HEART RATE: 95 BPM

## 2024-08-05 DIAGNOSIS — F43.10 POST TRAUMATIC STRESS DISORDER (PTSD): Primary | ICD-10-CM

## 2024-08-05 DIAGNOSIS — F33.41 RECURRENT MAJOR DEPRESSIVE DISORDER, IN PARTIAL REMISSION: ICD-10-CM

## 2024-08-05 DIAGNOSIS — F41.1 GAD (GENERALIZED ANXIETY DISORDER): ICD-10-CM

## 2024-08-05 PROCEDURE — 87088 URINE BACTERIA CULTURE: CPT | Performed by: NURSE PRACTITIONER

## 2024-08-05 PROCEDURE — 87186 SC STD MICRODIL/AGAR DIL: CPT | Performed by: NURSE PRACTITIONER

## 2024-08-05 PROCEDURE — 87086 URINE CULTURE/COLONY COUNT: CPT | Performed by: NURSE PRACTITIONER

## 2024-08-05 RX ORDER — BUPROPION HYDROCHLORIDE 300 MG/1
300 TABLET ORAL EVERY MORNING
Qty: 90 TABLET | Refills: 0 | Status: SHIPPED | OUTPATIENT
Start: 2024-08-05

## 2024-08-05 RX ORDER — FLUOXETINE HYDROCHLORIDE 40 MG/1
80 CAPSULE ORAL DAILY
Qty: 90 CAPSULE | Refills: 0 | Status: SHIPPED | OUTPATIENT
Start: 2024-08-05

## 2024-08-05 RX ORDER — PRAZOSIN HYDROCHLORIDE 1 MG/1
1 CAPSULE ORAL NIGHTLY
Qty: 90 CAPSULE | Refills: 0 | Status: SHIPPED | OUTPATIENT
Start: 2024-08-05
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
## 2024-08-15 ENCOUNTER — OFFICE VISIT (OUTPATIENT)
Dept: FAMILY MEDICINE CLINIC | Facility: CLINIC | Age: 34
End: 2024-08-15
Payer: MEDICAID

## 2024-08-15 VITALS
OXYGEN SATURATION: 100 % | WEIGHT: 260.4 LBS | SYSTOLIC BLOOD PRESSURE: 126 MMHG | HEART RATE: 85 BPM | DIASTOLIC BLOOD PRESSURE: 82 MMHG | BODY MASS INDEX: 46.14 KG/M2 | HEIGHT: 63 IN

## 2024-08-15 DIAGNOSIS — R39.9 UTI SYMPTOMS: Primary | ICD-10-CM

## 2024-08-15 LAB
BILIRUB BLD-MCNC: NEGATIVE MG/DL
CLARITY, POC: ABNORMAL
COLOR UR: ABNORMAL
EXPIRATION DATE: ABNORMAL
GLUCOSE UR STRIP-MCNC: NEGATIVE MG/DL
KETONES UR QL: NEGATIVE
LEUKOCYTE EST, POC: NEGATIVE
Lab: ABNORMAL
NITRITE UR-MCNC: NEGATIVE MG/ML
PH UR: 7 [PH] (ref 5–8)
PROT UR STRIP-MCNC: NEGATIVE MG/DL
RBC # UR STRIP: NEGATIVE /UL
SP GR UR: 1.02 (ref 1–1.03)
UROBILINOGEN UR QL: ABNORMAL

## 2024-08-15 PROCEDURE — 99213 OFFICE O/P EST LOW 20 MIN: CPT | Performed by: STUDENT IN AN ORGANIZED HEALTH CARE EDUCATION/TRAINING PROGRAM

## 2024-08-15 PROCEDURE — 1125F AMNT PAIN NOTED PAIN PRSNT: CPT | Performed by: STUDENT IN AN ORGANIZED HEALTH CARE EDUCATION/TRAINING PROGRAM

## 2024-08-15 NOTE — PROGRESS NOTES
"Chief Complaint  Urinary Tract Infection (Urgent care follow up )    Subjective      Karine Swanson is a 34 y.o. female who presents to [facility]     Coming from urgent care after UTI. She was treated UA today clear. Pt asymptomatic.       Objective   Vital Signs:   Vitals:    08/15/24 1418   BP: 126/82   BP Location: Right arm   Patient Position: Sitting   Cuff Size: Adult   Pulse: 85   SpO2: 100%   Weight: 118 kg (260 lb 6.4 oz)   Height: 160 cm (62.99\")     Body mass index is 46.14 kg/m².    Wt Readings from Last 3 Encounters:   08/15/24 118 kg (260 lb 6.4 oz)   08/05/24 119 kg (263 lb)   08/05/24 119 kg (263 lb)     BP Readings from Last 3 Encounters:   08/15/24 126/82   08/05/24 138/78   08/05/24 134/87       Health Maintenance   Topic Date Due    ANNUAL PHYSICAL  Never done    INFLUENZA VACCINE  08/01/2024    TDAP/TD VACCINES (1 - Tdap) 11/28/2024 (Originally 6/7/2010)    COVID-19 Vaccine (1 - 2023-24 season) 05/14/2025 (Originally 9/1/2023)    LIPID PANEL  11/01/2024    BMI FOLLOWUP  11/28/2024    PAP SMEAR  11/28/2026    HEPATITIS C SCREENING  Completed    Pneumococcal Vaccine 0-64  Aged Out       Physical Exam  Vitals reviewed.   HENT:      Head: Normocephalic.      Mouth/Throat:      Mouth: Mucous membranes are moist.   Eyes:      Pupils: Pupils are equal, round, and reactive to light.   Cardiovascular:      Rate and Rhythm: Normal rate.   Abdominal:      General: Abdomen is flat.   Musculoskeletal:         General: Normal range of motion.      Cervical back: Normal range of motion.   Skin:     General: Skin is warm.      Capillary Refill: Capillary refill takes less than 2 seconds.   Neurological:      Mental Status: She is alert.          Assessment & Plan  UTI symptoms  Resolved.  Orders Placed This Encounter   Procedures    POC Urinalysis Dipstick, Automated                     I spent 10 minutes caring for Karine on this date of service. This time includes time spent by me " in the following activities:preparing for the visit, reviewing tests, obtaining and/or reviewing a separately obtained history, performing a medically appropriate examination and/or evaluation , counseling and educating the patient/family/caregiver, ordering medications, tests, or procedures, referring and communicating with other health care professionals , documenting information in the medical record, independently interpreting results and communicating that information with the patient/family/caregiver, and care coordination  FOLLOW UP  No follow-ups on file.  Patient was given instructions and counseling regarding her condition or for health maintenance advice. Please see specific information pulled into the AVS if appropriate.       Anand Swenson MD  08/15/24  15:02 EDT    CURRENT & DISCONTINUED MEDICATIONS  Current Outpatient Medications   Medication Instructions    buPROPion XL (WELLBUTRIN XL) 300 mg, Oral, Every Morning    FLUoxetine (PROZAC) 80 mg, Oral, Daily    meloxicam (MOBIC) 15 mg, Oral, Daily    prazosin (MINIPRESS) 1 mg, Oral, Nightly    SUMAtriptan (IMITREX) 25 MG tablet Take one tablet at onset of headache. May repeat dose one time in 2 hours if headache not relieved.    tolterodine LA (DETROL LA) 2 mg, Oral, Daily       There are no discontinued medications.

## 2024-08-30 ENCOUNTER — APPOINTMENT (OUTPATIENT)
Dept: CT IMAGING | Facility: HOSPITAL | Age: 34
End: 2024-08-30
Payer: MEDICAID

## 2024-08-30 ENCOUNTER — HOSPITAL ENCOUNTER (EMERGENCY)
Facility: HOSPITAL | Age: 34
Discharge: HOME | End: 2024-08-30
Attending: EMERGENCY MEDICINE
Payer: MEDICAID

## 2024-08-30 VITALS
HEART RATE: 73 BPM | WEIGHT: 260.8 LBS | BODY MASS INDEX: 46.21 KG/M2 | OXYGEN SATURATION: 100 % | DIASTOLIC BLOOD PRESSURE: 92 MMHG | RESPIRATION RATE: 20 BRPM | TEMPERATURE: 98.2 F | SYSTOLIC BLOOD PRESSURE: 156 MMHG | HEIGHT: 63 IN

## 2024-08-30 DIAGNOSIS — N39.0 ACUTE UTI: Primary | ICD-10-CM

## 2024-08-30 PROBLEM — R30.0 DYSURIA: Status: ACTIVE | Noted: 2024-08-30

## 2024-08-30 LAB
ALBUMIN SERPL-MCNC: 4.3 G/DL (ref 3.5–5.2)
ALBUMIN/GLOB SERPL: 1.2 G/DL
ALP SERPL-CCNC: 111 U/L (ref 39–117)
ALT SERPL W P-5'-P-CCNC: 11 U/L (ref 1–33)
ANION GAP SERPL CALCULATED.3IONS-SCNC: 13 MMOL/L (ref 5–15)
AST SERPL-CCNC: 13 U/L (ref 1–32)
BACTERIA UR QL AUTO: ABNORMAL /HPF
BASOPHILS # BLD AUTO: 0.04 10*3/MM3 (ref 0–0.2)
BASOPHILS NFR BLD AUTO: 0.4 % (ref 0–1.5)
BILIRUB SERPL-MCNC: 0.2 MG/DL (ref 0–1.2)
BILIRUB UR QL STRIP: NEGATIVE
BUN SERPL-MCNC: 12 MG/DL (ref 6–20)
BUN/CREAT SERPL: 15 (ref 7–25)
CALCIUM SPEC-SCNC: 9.7 MG/DL (ref 8.6–10.5)
CHLORIDE SERPL-SCNC: 102 MMOL/L (ref 98–107)
CLARITY UR: CLEAR
CO2 SERPL-SCNC: 22 MMOL/L (ref 22–29)
COLOR UR: YELLOW
CREAT SERPL-MCNC: 0.8 MG/DL (ref 0.57–1)
D-LACTATE SERPL-SCNC: 1 MMOL/L (ref 0.5–2)
DEPRECATED RDW RBC AUTO: 42.6 FL (ref 37–54)
EGFRCR SERPLBLD CKD-EPI 2021: 99.9 ML/MIN/1.73
EOSINOPHIL # BLD AUTO: 0.12 10*3/MM3 (ref 0–0.4)
EOSINOPHIL NFR BLD AUTO: 1.1 % (ref 0.3–6.2)
ERYTHROCYTE [DISTWIDTH] IN BLOOD BY AUTOMATED COUNT: 13.3 % (ref 12.3–15.4)
GLOBULIN UR ELPH-MCNC: 3.5 GM/DL
GLUCOSE SERPL-MCNC: 97 MG/DL (ref 65–99)
GLUCOSE UR STRIP-MCNC: NEGATIVE MG/DL
HCG INTACT+B SERPL-ACNC: <0.5 MIU/ML
HCT VFR BLD AUTO: 39 % (ref 34–46.6)
HGB BLD-MCNC: 12.3 G/DL (ref 12–15.9)
HGB UR QL STRIP.AUTO: ABNORMAL
HOLD SPECIMEN: NORMAL
HOLD SPECIMEN: NORMAL
HYALINE CASTS UR QL AUTO: ABNORMAL /LPF
IMM GRANULOCYTES # BLD AUTO: 0.02 10*3/MM3 (ref 0–0.05)
IMM GRANULOCYTES NFR BLD AUTO: 0.2 % (ref 0–0.5)
KETONES UR QL STRIP: NEGATIVE
LEUKOCYTE ESTERASE UR QL STRIP.AUTO: ABNORMAL
LIPASE SERPL-CCNC: 20 U/L (ref 13–60)
LYMPHOCYTES # BLD AUTO: 3.17 10*3/MM3 (ref 0.7–3.1)
LYMPHOCYTES NFR BLD AUTO: 28.7 % (ref 19.6–45.3)
MCH RBC QN AUTO: 27.6 PG (ref 26.6–33)
MCHC RBC AUTO-ENTMCNC: 31.5 G/DL (ref 31.5–35.7)
MCV RBC AUTO: 87.4 FL (ref 79–97)
MONOCYTES # BLD AUTO: 0.68 10*3/MM3 (ref 0.1–0.9)
MONOCYTES NFR BLD AUTO: 6.2 % (ref 5–12)
NEUTROPHILS NFR BLD AUTO: 63.4 % (ref 42.7–76)
NEUTROPHILS NFR BLD AUTO: 7.01 10*3/MM3 (ref 1.7–7)
NITRITE UR QL STRIP: NEGATIVE
NRBC BLD AUTO-RTO: 0 /100 WBC (ref 0–0.2)
PH UR STRIP.AUTO: 5.5 [PH] (ref 5–8)
PLATELET # BLD AUTO: 420 10*3/MM3 (ref 140–450)
PMV BLD AUTO: 9.2 FL (ref 6–12)
POTASSIUM SERPL-SCNC: 3.9 MMOL/L (ref 3.5–5.2)
PROT SERPL-MCNC: 7.8 G/DL (ref 6–8.5)
PROT UR QL STRIP: NEGATIVE
RBC # BLD AUTO: 4.46 10*6/MM3 (ref 3.77–5.28)
RBC # UR STRIP: ABNORMAL /HPF
REF LAB TEST METHOD: ABNORMAL
SODIUM SERPL-SCNC: 137 MMOL/L (ref 136–145)
SP GR UR STRIP: 1.01 (ref 1–1.03)
SQUAMOUS #/AREA URNS HPF: ABNORMAL /HPF
UROBILINOGEN UR QL STRIP: ABNORMAL
WBC # UR STRIP: ABNORMAL /HPF
WBC NRBC COR # BLD AUTO: 11.04 10*3/MM3 (ref 3.4–10.8)
WHOLE BLOOD HOLD COAG: NORMAL
WHOLE BLOOD HOLD SPECIMEN: NORMAL

## 2024-08-30 PROCEDURE — 36415 COLL VENOUS BLD VENIPUNCTURE: CPT

## 2024-08-30 PROCEDURE — 80053 COMPREHEN METABOLIC PANEL: CPT

## 2024-08-30 PROCEDURE — 87088 URINE BACTERIA CULTURE: CPT

## 2024-08-30 PROCEDURE — 83605 ASSAY OF LACTIC ACID: CPT

## 2024-08-30 PROCEDURE — 87040 BLOOD CULTURE FOR BACTERIA: CPT

## 2024-08-30 PROCEDURE — 25010000002 CEFTRIAXONE PER 250 MG

## 2024-08-30 PROCEDURE — 25510000001 IOPAMIDOL PER 1 ML: Performed by: EMERGENCY MEDICINE

## 2024-08-30 PROCEDURE — 96365 THER/PROPH/DIAG IV INF INIT: CPT

## 2024-08-30 PROCEDURE — 87086 URINE CULTURE/COLONY COUNT: CPT

## 2024-08-30 PROCEDURE — 83690 ASSAY OF LIPASE: CPT

## 2024-08-30 PROCEDURE — 87186 SC STD MICRODIL/AGAR DIL: CPT

## 2024-08-30 PROCEDURE — 36415 COLL VENOUS BLD VENIPUNCTURE: CPT | Performed by: EMERGENCY MEDICINE

## 2024-08-30 PROCEDURE — 99285 EMERGENCY DEPT VISIT HI MDM: CPT

## 2024-08-30 PROCEDURE — 85025 COMPLETE CBC W/AUTO DIFF WBC: CPT

## 2024-08-30 PROCEDURE — 84702 CHORIONIC GONADOTROPIN TEST: CPT

## 2024-08-30 PROCEDURE — 74177 CT ABD & PELVIS W/CONTRAST: CPT

## 2024-08-30 PROCEDURE — 81001 URINALYSIS AUTO W/SCOPE: CPT

## 2024-08-30 RX ORDER — SULFAMETHOXAZOLE/TRIMETHOPRIM 800-160 MG
1 TABLET ORAL 2 TIMES DAILY
Qty: 20 TABLET | Refills: 0 | Status: SHIPPED | OUTPATIENT
Start: 2024-08-30

## 2024-08-30 RX ORDER — IOPAMIDOL 755 MG/ML
100 INJECTION, SOLUTION INTRAVASCULAR
Status: COMPLETED | OUTPATIENT
Start: 2024-08-30 | End: 2024-08-30

## 2024-08-30 RX ORDER — SULFAMETHOXAZOLE/TRIMETHOPRIM 800-160 MG
1 TABLET ORAL 2 TIMES DAILY
Qty: 20 TABLET | Refills: 0 | Status: SHIPPED | OUTPATIENT
Start: 2024-08-30 | End: 2024-08-30

## 2024-08-30 RX ORDER — SODIUM CHLORIDE 0.9 % (FLUSH) 0.9 %
10 SYRINGE (ML) INJECTION AS NEEDED
Status: DISCONTINUED | OUTPATIENT
Start: 2024-08-30 | End: 2024-08-30 | Stop reason: HOSPADM

## 2024-08-30 RX ADMIN — CEFTRIAXONE SODIUM 1000 MG: 1 INJECTION, POWDER, FOR SOLUTION INTRAMUSCULAR; INTRAVENOUS at 20:33

## 2024-08-30 RX ADMIN — IOPAMIDOL 100 ML: 755 INJECTION, SOLUTION INTRAVENOUS at 20:05

## 2024-08-30 NOTE — ED PROVIDER NOTES
Time: 7:34 PM EDT  Date of encounter:  2024  Independent Historian/Clinical History and Information was obtained by:   Patient    History is limited by: N/A    Chief Complaint: Abdominal pain      History of Present Illness:  Patient is a 34 y.o. year old female who presents to the emergency department for evaluation of abdominal pain.  Patient is having right-sided lower abdominal pain right flank pain x 1 week.  She began having dysuria this morning.  She states that she has felt weak all week.  She denies vomiting or diarrhea but does states she has had nausea.  She denies hematuria or frequency.  She states she began developing a fever this morning with highest home being 100.  She admits to history of cholecystectomy.  Patient was seen in urgent care today and referred to the emergency department for further evaluation.  No other complaints.      Patient Care Team  Primary Care Provider: Zoë Yang APRN    Past Medical History:     No Known Allergies  Past Medical History:   Diagnosis Date    Anxiety     Depression     Migraine     Ovarian cyst     Pelvic pain     Pre-diabetes      Past Surgical History:   Procedure Laterality Date     SECTION N/A 3/25/2022    Procedure:  SECTION PRIMARY;  Surgeon: Avi Kelly MD;  Location: [facility] LABOR DELIVERY;  Service: Gynecology;  Laterality: N/A;    DIAGNOSTIC LAPAROSCOPY N/A 2024    Procedure: DIAGNOSTIC LAPAROSCOPY, LYSIS OF ADHESIONS;  Surgeon: Avi Kelly MD;  Location: [facility] MAIN OR;  Service: Gynecology;  Laterality: N/A;    LAPAROSCOPIC CHOLECYSTECTOMY      TONSILLECTOMY       Family History   Problem Relation Age of Onset    No Known Problems Mother     No Known Problems Father     No Known Problems Sister     No Known Problems Brother     No Known Problems Maternal Aunt     No Known Problems Maternal Uncle     No Known Problems Paternal Aunt     No Known Problems Paternal Uncle     No Known Problems  Maternal Grandmother     No Known Problems Maternal Grandfather     No Known Problems Paternal Grandmother     No Known Problems Paternal Grandfather     No Known Problems Cousin     No Known Problems Other     ADD / ADHD Neg Hx     Alcohol abuse Neg Hx     Anxiety disorder Neg Hx     Bipolar disorder Neg Hx     Dementia Neg Hx     Depression Neg Hx     Drug abuse Neg Hx     OCD Neg Hx     Paranoid behavior Neg Hx     Schizophrenia Neg Hx     Seizures Neg Hx     Self-Injurious Behavior  Neg Hx     Suicide Attempts Neg Hx     Breast cancer Neg Hx     Ovarian cancer Neg Hx     Uterine cancer Neg Hx     Colon cancer Neg Hx     Malig Hyperthermia Neg Hx        Home Medications:  Prior to Admission medications    Medication Sig Start Date End Date Taking? Authorizing Provider   buPROPion XL (Wellbutrin XL) 300 MG 24 hr tablet Take 1 tablet by mouth Every Morning. 8/5/24   Ancelmo Guzman MD   FLUoxetine (PROzac) 40 MG capsule Take 2 capsules by mouth Daily. 8/5/24   Ancelmo Guzman MD   meloxicam (Mobic) 15 MG tablet Take 1 tablet by mouth Daily.  Patient not taking: Reported on 8/5/2024 7/17/24   Zoë Yang APRN   prazosin (MINIPRESS) 1 MG capsule Take 1 capsule by mouth Every Night. 8/5/24   Ancelmo Guzman MD   SUMAtriptan (IMITREX) 25 MG tablet Take one tablet at onset of headache. May repeat dose one time in 2 hours if headache not relieved. 7/17/24   Zoë Yang APRN   tolterodine LA (DETROL LA) 2 MG 24 hr capsule Take 1 capsule by mouth Daily. 7/17/24   Zoë Yang APRN        Social History:   Social History     Tobacco Use    Smoking status: Never     Passive exposure: Never    Smokeless tobacco: Never   Vaping Use    Vaping status: Never Used   Substance Use Topics    Alcohol use: Never    Drug use: Never         Review of Systems:  Review of Systems   Constitutional:  Positive for fever.   Respiratory:  Negative for shortness of breath.   "  Cardiovascular:  Negative for chest pain.   Gastrointestinal:  Positive for abdominal pain and nausea. Negative for diarrhea and vomiting.   Genitourinary:  Positive for dysuria and flank pain. Negative for hematuria.   Neurological:  Positive for weakness.        Physical Exam:  /92 (BP Location: Left arm, Patient Position: Sitting)   Pulse 73   Temp 98.2 °F (36.8 °C) (Oral)   Resp 20   Ht 160 cm (63\")   Wt 118 kg (260 lb 12.9 oz)   LMP 07/08/2024 (Exact Date)   SpO2 100%   BMI 46.20 kg/m²     Physical Exam  Vitals and nursing note reviewed.   Constitutional:       General: She is not in acute distress.     Appearance: Normal appearance. She is well-developed. She is not ill-appearing, toxic-appearing or diaphoretic.   HENT:      Head: Normocephalic and atraumatic.      Nose: Nose normal.   Eyes:      Extraocular Movements: Extraocular movements intact.      Conjunctiva/sclera: Conjunctivae normal.      Pupils: Pupils are equal, round, and reactive to light.   Cardiovascular:      Rate and Rhythm: Normal rate and regular rhythm.      Heart sounds: Normal heart sounds.   Pulmonary:      Effort: Pulmonary effort is normal.      Breath sounds: Normal breath sounds.   Abdominal:      General: Abdomen is flat. There is no distension.      Palpations: Abdomen is soft. There is no hepatomegaly, splenomegaly, mass or pulsatile mass.      Tenderness: There is abdominal tenderness in the right lower quadrant and suprapubic area. There is no guarding or rebound. Positive signs include McBurney's sign. Negative signs include Solares's sign and Rovsing's sign.   Musculoskeletal:         General: Normal range of motion.      Cervical back: Normal range of motion and neck supple.   Skin:     General: Skin is warm and dry.   Neurological:      General: No focal deficit present.      Mental Status: She is alert and oriented to person, place, and time.   Psychiatric:         Mood and Affect: Mood normal.         " Behavior: Behavior normal.         Thought Content: Thought content normal.         Judgment: Judgment normal.                Procedures:  Procedures      Medical Decision Making:    Comorbidities that affect care:    None    External Notes reviewed:    Previous Clinic Note: Urgent care note from today where patient was seen for same complaint      The following orders were placed and all results were independently analyzed by me:  Orders Placed This Encounter   Procedures    Blood Culture - Blood,    Blood Culture - Blood,    Urine Culture - Urine,    CT Abdomen Pelvis With Contrast    [place] Draw    Comprehensive Metabolic Panel    Lipase    Urinalysis With Microscopic If Indicated (No Culture) - Urine, Clean Catch    hCG, Quantitative, Pregnancy    CBC Auto Differential    Urinalysis, Microscopic Only - Urine, Clean Catch    Lactic Acid, Plasma    NPO Diet NPO Type: Strict NPO    Undress & Gown    Insert Peripheral IV    CBC & Differential    Green Top (Gel)    Lavender Top    Gold Top - SST    Light Blue Top       Medications Given in the Emergency Department:  Medications   sodium chloride 0.9 % flush 10 mL (has no administration in time range)   cefTRIAXone (ROCEPHIN) 1,000 mg in sodium chloride 0.9 % 100 mL IVPB-VTB (1,000 mg Intravenous New Bag 8/30/24 2033)   iopamidol (ISOVUE-370) 76 % injection 100 mL (100 mL Intravenous Given 8/30/24 2005)        ED Course:         Labs:    Lab Results (last 24 hours)       Procedure Component Value Units Date/Time    POC Urinalysis Dipstick, Multipro (Automated Dipstick) [[id number]]  (Abnormal) Resulted: 08/30/24 1723    Specimen: Urine Updated: 08/30/24 1724     Color Yellow     Clarity, UA Cloudy     Glucose, UA Negative mg/dL      Bilirubin Negative     Ketones, UA Negative     Specific Gravity  1.020     Blood, UA 1+     pH, Urine 6.0     Protein, POC Negative mg/dL      Urobilinogen, UA Normal     Nitrite, UA Negative     Leukocytes Large (3+)    POC Pregnancy,  Urine [[id number]]  (Normal) Resulted: 08/30/24 1736    Specimen: Urine Updated: 08/30/24 1737     HCG, Urine, QL Negative     Lot Number #[id number]     Internal Positive Control Passed     Internal Negative Control Passed     Expiration Date 10/5/2025    CBC & Differential [[id number]]  (Abnormal) Collected: 08/30/24 1817    Specimen: Blood from Arm, Right Updated: 08/30/24 1832    Narrative:      The following orders were created for panel order CBC & Differential.  Procedure                               Abnormality         Status                     ---------                               -----------         ------                     CBC Auto Differential[[id number]]        Abnormal            Final result                 Please view results for these tests on the individual orders.    Comprehensive Metabolic Panel [[id number]] Collected: 08/30/24 1817    Specimen: Blood from Arm, Right Updated: 08/30/24 1854     Glucose 97 mg/dL      BUN 12 mg/dL      Creatinine 0.80 mg/dL      Sodium 137 mmol/L      Potassium 3.9 mmol/L      Chloride 102 mmol/L      CO2 22.0 mmol/L      Calcium 9.7 mg/dL      Total Protein 7.8 g/dL      Albumin 4.3 g/dL      ALT (SGPT) 11 U/L      AST (SGOT) 13 U/L      Alkaline Phosphatase 111 U/L      Total Bilirubin 0.2 mg/dL      Globulin 3.5 gm/dL      A/G Ratio 1.2 g/dL      BUN/Creatinine Ratio 15.0     Anion Gap 13.0 mmol/L      eGFR 99.9 mL/min/1.73     Narrative:      GFR Normal >60  Chronic Kidney Disease <60  Kidney Failure <15      Lipase [[id number]]  (Normal) Collected: 08/30/24 1817    Specimen: Blood from Arm, Right Updated: 08/30/24 1854     Lipase 20 U/L     hCG, Quantitative, Pregnancy [[id number]] Collected: 08/30/24 1817    Specimen: Blood from Arm, Right Updated: 08/30/24 1849     HCG Quantitative <0.50 mIU/mL     Narrative:      HCG Ranges by Gestational Age    Females - non-pregnant premenopausal   </= 1mIU/mL HCG  Females - postmenopausal               </= 7mIU/mL  HCG    3 Weeks       5.4   -      72 mIU/mL  4 Weeks      10.2   -     708 mIU/mL  5 Weeks       217   -   8,245 mIU/mL  6 Weeks       152   -  32,177 mIU/mL  7 Weeks     4,059   - 153,767 mIU/mL  8 Weeks    31,366   - 149,094 mIU/mL  9 Weeks    59,109   - 135,901 mIU/mL  10 Weeks   44,186   - 170,409 mIU/mL  12 Weeks   27,107   - 201,615 mIU/mL  14 Weeks   24,302   -  93,646 mIU/mL  15 Weeks   12,540   -  69,747 mIU/mL  16 Weeks    8,904   -  55,332 mIU/mL  17 Weeks    8,240   -  51,793 mIU/mL  18 Weeks    9,649   -  55,271 mIU/mL      CBC Auto Differential [[id number]]  (Abnormal) Collected: 08/30/24 1817    Specimen: Blood from Arm, Right Updated: 08/30/24 1832     WBC 11.04 10*3/mm3      RBC 4.46 10*6/mm3      Hemoglobin 12.3 g/dL      Hematocrit 39.0 %      MCV 87.4 fL      MCH 27.6 pg      MCHC 31.5 g/dL      RDW 13.3 %      RDW-SD 42.6 fl      MPV 9.2 fL      Platelets 420 10*3/mm3      Neutrophil % 63.4 %      Lymphocyte % 28.7 %      Monocyte % 6.2 %      Eosinophil % 1.1 %      Basophil % 0.4 %      Immature Grans % 0.2 %      Neutrophils, Absolute 7.01 10*3/mm3      Lymphocytes, Absolute 3.17 10*3/mm3      Monocytes, Absolute 0.68 10*3/mm3      Eosinophils, Absolute 0.12 10*3/mm3      Basophils, Absolute 0.04 10*3/mm3      Immature Grans, Absolute 0.02 10*3/mm3      nRBC 0.0 /100 WBC     Urinalysis With Microscopic If Indicated (No Culture) - Urine, Clean Catch [[id number]]  (Abnormal) Collected: 08/30/24 1822    Specimen: Urine, Clean Catch Updated: 08/30/24 1843     Color, UA Yellow     Appearance, UA Clear     pH, UA 5.5     Specific Gravity, UA 1.009     Glucose, UA Negative     Ketones, UA Negative     Bilirubin, UA Negative     Blood, UA Small (1+)     Protein, UA Negative     Leuk Esterase, UA Large (3+)     Nitrite, UA Negative     Urobilinogen, UA 0.2 E.U./dL    Urinalysis, Microscopic Only - Urine, Clean Catch [[id number]]  (Abnormal) Collected: 08/30/24 1822    Specimen: Urine, Clean Catch  Updated: 08/30/24 1843     RBC, UA 3-5 /HPF      WBC, UA Too Numerous to Count /HPF      Bacteria, UA 3+ /HPF      Squamous Epithelial Cells, UA 0-2 /HPF      Hyaline Casts, UA 0-2 /LPF      Methodology Automated Microscopy    Urine Culture - Urine, Urine, Clean Catch [[id number]] Collected: 08/30/24 1822    Specimen: Urine, Clean Catch Updated: 08/30/24 1942    Lactic Acid, Plasma [[id number]]  (Normal) Collected: 08/30/24 1948    Specimen: Blood from Arm, Right Updated: 08/30/24 2021     Lactate 1.0 mmol/L     Blood Culture - Blood, Arm, Right [[id number]] Collected: 08/30/24 1948    Specimen: Blood from Arm, Right Updated: 08/30/24 1954    Blood Culture - Blood, Arm, Right [[id number]] Collected: 08/30/24 2000    Specimen: Blood from Arm, Right Updated: 08/30/24 2004             Imaging:    CT Abdomen Pelvis With Contrast    Result Date: 8/30/2024  CT ABDOMEN PELVIS W CONTRAST Date of Exam: 8/30/2024 8:05 PM EDT Indication: Right flank and right lower quadrant abdominal pain. Comparison: CT scan of the abdomen and pelvis 5/15/2024. Technique: Axial CT images were obtained of the abdomen and pelvis after the uneventful intravenous administration of iodinated contrast. Reconstructed coronal and sagittal images were also obtained. Automated exposure control and iterative construction methods were used. Findings: Subsegmental atelectasis is seen at the lung bases. The liver is of normal size and uniform density. The gallbladder is absent, surgical clips are seen in the gallbladder bed. No pancreatic or adrenal mass is evident. The spleen is of normal size. The kidneys enhance bilaterally. No renal or ureteral stones are seen. There is no evidence of hydronephrosis. The urinary bladder is not abnormally distended. The uterus measures 6 cm in greatest transverse dimension. No pelvic mass is seen. No free pelvic fluid is evident. The stomach is not abnormally distended. Bowel loops are not abnormally dilated. The appendix  is not visualized. No inflammatory change is seen in the region of the cecum. Small umbilical hernia containing fat is stable. Bony structures appear intact.     Impression: CT scan of the abdomen and pelvis with IV contrast demonstrating previous cholecystectomy. Small umbilical hernia containing fat. Electronically Signed: Guanakito Charles MD  8/30/2024 8:29 PM EDT  Workstation ID: [id number]       Differential Diagnosis and Discussion:    Abdominal Pain: Based on the patient's signs and symptoms, I considered abdominal aortic aneurysm, small bowel obstruction, pancreatitis, acute cholecystitis, acute appendecitis, peptic ulcer disease, gastritis, colitis, endocrine disorders, irritable bowel syndrome and other differential diagnosis an etiology of the patient's abdominal pain.    All labs were reviewed and interpreted by me.  CT scan radiology impression was interpreted by me.    MDM  Number of Diagnoses or Management Options  Diagnosis management comments: Patient presented to the emergency department today for evaluation of abdominal pain and dysuria.  CBC notes Levacet, 11.04.  CMP unremarkable.  hCG negative.  Lipase unremarkable.  Urinalysis positive for acute UTI.  Urine culture sent.  Lactic acid unremarkable.  Patient given 1 g Rocephin in the emergency department.  Patient did have a recent urine culture not begin patient on Bactrim due to associated abdominal and flank pain.  CT abdomen pelvis had no acute findings.  Patient given return to the emergency department guidelines.       Amount and/or Complexity of Data Reviewed  Clinical lab tests: reviewed and ordered  Tests in the radiology section of CPT®: reviewed and ordered    Risk of Complications, Morbidity, and/or Mortality  Presenting problems: moderate  Diagnostic procedures: moderate  Management options: low    Patient Progress  Patient progress: stable      Patient Care Considerations:    SEPSIS was considered but is NOT present in the emergency  department as SIRS criteria is not present.      Consultants/Shared Management Plan:    None    Social Determinants of Health:    Patient is independent, reliable, and has access to care.       Disposition and Care Coordination:    Discharged: The patient is suitable and stable for discharge with no need for consideration of admission.    I have explained the patient´s condition, diagnoses and treatment plan based on the information available to me at this time. I have answered questions and addressed any concerns. The patient has a good  understanding of the patient´s diagnosis, condition, and treatment plan as can be expected at this point. The vital signs have been stable. The patient´s condition is stable and appropriate for discharge from the emergency department.      The patient will pursue further outpatient evaluation with the primary care physician or other designated or consulting physician as outlined in the discharge instructions. They are agreeable to this plan of care and follow-up instructions have been explained in detail. The patient has received these instructions in written format and has expressed an understanding of the discharge instructions. The patient is aware that any significant change in condition or worsening of symptoms should prompt an immediate return to this or the closest emergency department or call to 911.  I have explained discharge medications and the need for follow up with the patient/caretakers. This was also printed in the discharge instructions. Patient was discharged with the following medications and follow up:      Medication List        New Prescriptions      sulfamethoxazole-trimethoprim 800-160 MG per tablet  Commonly known as: BACTRIM DS,SEPTRA DS  Take 1 tablet by mouth 2 (Two) Times a Day.               Where to Get Your Medications        These medications were sent to [facility] [id number] [place], [place] - [address] [facility] [address] -  [phone]  - [phone] FX  [address] [place] [address]      Phone: [phone]   sulfamethoxazole-trimethoprim 800-160 MG per tablet      Zoë Yang, APRN  [address] [place] [address]  [phone]             Final diagnoses:   Acute UTI        ED Disposition       ED Disposition   Discharge    Condition   Stable    Comment   --               This medical record created using voice recognition software.             Fazal Jain PA-C  08/30/24 [id number]

## 2024-08-30 NOTE — ED TRIAGE NOTES
Pt to ed from [facility] with c/o RLQ pain. Pain with urination. RLQ pain  into back. Pt had surgery removing scar tissue. Pt unsure what surgery was for. Hx of UTI. Negative pregnancy test.

## 2024-08-31 NOTE — DISCHARGE INSTRUCTIONS
Take the full course of antibiotics as directed.  We are culturing your urine so if there is need for antibiotic change you receive a phone call in the next 2 to 3 days.  Return to the emergency department for chest pain, shortness of breath, new or worsening symptoms concerning to you.

## 2024-09-01 LAB — BACTERIA SPEC AEROBE CULT: ABNORMAL

## 2024-09-04 LAB
BACTERIA SPEC AEROBE CULT: NORMAL
BACTERIA SPEC AEROBE CULT: NORMAL

## 2024-09-19 DIAGNOSIS — F33.41 RECURRENT MAJOR DEPRESSIVE DISORDER, IN PARTIAL REMISSION: ICD-10-CM

## 2024-09-19 DIAGNOSIS — F41.1 GAD (GENERALIZED ANXIETY DISORDER): ICD-10-CM

## 2024-09-19 RX ORDER — ARIPIPRAZOLE 2 MG/1
2 TABLET ORAL
Qty: 90 TABLET | Refills: 0 | Status: SHIPPED | OUTPATIENT
Start: 2024-09-19

## 2024-09-19 RX ORDER — BUPROPION HYDROCHLORIDE 150 MG/1
TABLET ORAL
Qty: 90 TABLET | Refills: 0 | Status: SHIPPED | OUTPATIENT
Start: 2024-09-19

## 2024-09-19 RX ORDER — LORAZEPAM 1 MG/1
1 TABLET ORAL
Qty: 90 TABLET | Refills: 0 | Status: SHIPPED | OUTPATIENT
Start: 2024-09-19

## 2024-10-17 NOTE — PROGRESS NOTES
ACUTE VISIT     Patient Name: Karine Swanson  : 1990   MRN: [id number]     Chief Complaint:    Chief Complaint   Patient presents with    Possible Pregnancy       History of Present Illness: Karine Swanson is a 34 y.o. female who is here today for pregnancy conformation.       She took an at home pregnancy test 10/2/24 positive.  Lmp-  she was having cramping and spotting blood and then it got heavier and she says she passed what she thinks was the start of a baby forming. Cramping since Monday, bleeding through yesterday morning   Negative pregnancy test in office.  PAP 2023    Subjective      Review of Systems:   Review of Systems   Constitutional:  Negative for fever.   Respiratory:  Negative for cough.    Cardiovascular:  Negative for chest pain.   Gastrointestinal:  Negative for abdominal distention.   Genitourinary:  Positive for menstrual problem and pelvic pain. Negative for dysuria.        Past Medical History:   Past Medical History:   Diagnosis Date    Anxiety     Depression     Migraine     Ovarian cyst     Pelvic pain     Pre-diabetes        Past Surgical History:   Past Surgical History:   Procedure Laterality Date     SECTION N/A 3/25/2022    Procedure:  SECTION PRIMARY;  Surgeon: Avi Kelly MD;  Location: [facility] LABOR DELIVERY;  Service: Gynecology;  Laterality: N/A;    DIAGNOSTIC LAPAROSCOPY N/A 2024    Procedure: DIAGNOSTIC LAPAROSCOPY, LYSIS OF ADHESIONS;  Surgeon: Avi Kelly MD;  Location: [facility] MAIN OR;  Service: Gynecology;  Laterality: N/A;    LAPAROSCOPIC CHOLECYSTECTOMY      TONSILLECTOMY         Family History:   Family History   Problem Relation Age of Onset    No Known Problems Mother     No Known Problems Father     No Known Problems Sister     No Known Problems Brother     No Known Problems Maternal Aunt     No Known Problems Maternal Uncle     No Known Problems Paternal Aunt     No Known Problems Paternal Uncle     No  Known Problems Maternal Grandmother     No Known Problems Maternal Grandfather     No Known Problems Paternal Grandmother     No Known Problems Paternal Grandfather     No Known Problems Cousin     No Known Problems Other     ADD / ADHD Neg Hx     Alcohol abuse Neg Hx     Anxiety disorder Neg Hx     Bipolar disorder Neg Hx     Dementia Neg Hx     Depression Neg Hx     Drug abuse Neg Hx     OCD Neg Hx     Paranoid behavior Neg Hx     Schizophrenia Neg Hx     Seizures Neg Hx     Self-Injurious Behavior  Neg Hx     Suicide Attempts Neg Hx     Breast cancer Neg Hx     Ovarian cancer Neg Hx     Uterine cancer Neg Hx     Colon cancer Neg Hx     Malig Hyperthermia Neg Hx        Social History:   Social History     Socioeconomic History    Marital status:    Tobacco Use    Smoking status: Never     Passive exposure: Never    Smokeless tobacco: Never   Vaping Use    Vaping status: Never Used   Substance and Sexual Activity    Alcohol use: Never    Drug use: Never    Sexual activity: Defer     Partners: Male     Birth control/protection: None, Birth control pill       Medications:     Current Outpatient Medications:     ARIPiprazole (ABILIFY) 2 MG tablet, TAKE ONE TABLET BY MOUTH EVERY NIGHT, Disp: 90 tablet, Rfl: 0    buPROPion XL (WELLBUTRIN XL) 150 MG 24 hr tablet, TAKE ONE TABLET BY MOUTH EVERY MORNING. TAKE along WITH 300mg TABLET FOR total DAILY DOSE OF 450mg, Disp: 90 tablet, Rfl: 0    buPROPion XL (Wellbutrin XL) 300 MG 24 hr tablet, Take 1 tablet by mouth Every Morning., Disp: 90 tablet, Rfl: 0    FLUoxetine (PROzac) 40 MG capsule, Take 2 capsules by mouth Daily., Disp: 90 capsule, Rfl: 0    LORazepam (ATIVAN) 1 MG tablet, TAKE ONE TABLET BY MOUTH EVERY NIGHT AT BEDTIME, Disp: 90 tablet, Rfl: 0    prazosin (MINIPRESS) 1 MG capsule, Take 1 capsule by mouth Every Night., Disp: 90 capsule, Rfl: 0    sulfamethoxazole-trimethoprim (BACTRIM DS,SEPTRA DS) 800-160 MG per tablet, Take 1 tablet by mouth 2 (Two) Times a  "Day., Disp: 20 tablet, Rfl: 0    SUMAtriptan (IMITREX) 25 MG tablet, Take one tablet at onset of headache. May repeat dose one time in 2 hours if headache not relieved., Disp: 30 tablet, Rfl: 3    tolterodine LA (DETROL LA) 2 MG 24 hr capsule, Take 1 capsule by mouth Daily., Disp: 30 capsule, Rfl: 5    meloxicam (Mobic) 15 MG tablet, Take 1 tablet by mouth Daily. (Patient not taking: Reported on 10/18/2024), Disp: 30 tablet, Rfl: 2    Allergies:   No Known Allergies      Objective     Physical Exam:  Vital Signs:   Vitals:    10/18/24 0819   BP: 119/84   Pulse: 75   Temp: 97.7 °F (36.5 °C)   SpO2: 100%   Weight: 118 kg (261 lb)   Height: 160 cm (63\")     Body mass index is 46.23 kg/m².     Physical Exam  Cardiovascular:      Rate and Rhythm: Normal rate and regular rhythm.      Heart sounds: Normal heart sounds. No murmur heard.  Pulmonary:      Effort: Pulmonary effort is normal.      Breath sounds: Normal breath sounds.   Abdominal:      General: Bowel sounds are normal.      Palpations: Abdomen is soft.   Skin:     General: Skin is warm and dry.   Neurological:      Mental Status: She is alert.             Assessment / Plan      Assessment/Plan:   Diagnoses and all orders for this visit:    1. Positive pregnancy test (Primary)  -     hCG, Quantitative, Pregnancy; Future  -     hCG, Serum, Qualitative; Future  -     POCT pregnancy, urine  -     Cancel: US Ob Transvaginal; Future  -     US Ob Transvaginal; Future    2. Vaginal bleeding  -     Cancel: US Ob Transvaginal; Future  -     US Ob Transvaginal; Future  -     CBC Auto Differential         Positive pregnancy test with vaginal bleeding will obtain stat OB ultrasound labs call with results and further recommendations      Follow Up:   Return if symptoms worsen or fail to improve.    JEREMY Yuan      Please note that portions of this note were completed with a voice recognition program.  "

## 2024-10-18 ENCOUNTER — LAB (OUTPATIENT)
Dept: LAB | Facility: HOSPITAL | Age: 34
End: 2024-10-18
Payer: MEDICAID

## 2024-10-18 ENCOUNTER — OFFICE VISIT (OUTPATIENT)
Dept: FAMILY MEDICINE CLINIC | Facility: CLINIC | Age: 34
End: 2024-10-18
Payer: MEDICAID

## 2024-10-18 VITALS
HEIGHT: 63 IN | BODY MASS INDEX: 46.25 KG/M2 | DIASTOLIC BLOOD PRESSURE: 84 MMHG | SYSTOLIC BLOOD PRESSURE: 119 MMHG | HEART RATE: 75 BPM | TEMPERATURE: 97.7 F | OXYGEN SATURATION: 100 % | WEIGHT: 261 LBS

## 2024-10-18 DIAGNOSIS — N93.9 VAGINAL BLEEDING: ICD-10-CM

## 2024-10-18 DIAGNOSIS — Z32.01 POSITIVE PREGNANCY TEST: Primary | ICD-10-CM

## 2024-10-18 DIAGNOSIS — Z32.01 POSITIVE PREGNANCY TEST: ICD-10-CM

## 2024-10-18 LAB
B-HCG UR QL: NEGATIVE
BASOPHILS # BLD AUTO: 0.04 10*3/MM3 (ref 0–0.2)
BASOPHILS NFR BLD AUTO: 0.6 % (ref 0–1.5)
DEPRECATED RDW RBC AUTO: 39.8 FL (ref 37–54)
EOSINOPHIL # BLD AUTO: 0.16 10*3/MM3 (ref 0–0.4)
EOSINOPHIL NFR BLD AUTO: 2.4 % (ref 0.3–6.2)
ERYTHROCYTE [DISTWIDTH] IN BLOOD BY AUTOMATED COUNT: 12.7 % (ref 12.3–15.4)
EXPIRATION DATE: NORMAL
HCG INTACT+B SERPL-ACNC: <1 MIU/ML
HCG SERPL QL: NEGATIVE
HCT VFR BLD AUTO: 41.9 % (ref 34–46.6)
HGB BLD-MCNC: 13.3 G/DL (ref 12–15.9)
IMM GRANULOCYTES # BLD AUTO: 0.01 10*3/MM3 (ref 0–0.05)
IMM GRANULOCYTES NFR BLD AUTO: 0.1 % (ref 0–0.5)
INTERNAL NEGATIVE CONTROL: NEGATIVE
INTERNAL POSITIVE CONTROL: POSITIVE
LYMPHOCYTES # BLD AUTO: 2.49 10*3/MM3 (ref 0.7–3.1)
LYMPHOCYTES NFR BLD AUTO: 37.3 % (ref 19.6–45.3)
Lab: NORMAL
MCH RBC QN AUTO: 27.3 PG (ref 26.6–33)
MCHC RBC AUTO-ENTMCNC: 31.7 G/DL (ref 31.5–35.7)
MCV RBC AUTO: 86 FL (ref 79–97)
MONOCYTES # BLD AUTO: 0.5 10*3/MM3 (ref 0.1–0.9)
MONOCYTES NFR BLD AUTO: 7.5 % (ref 5–12)
NEUTROPHILS NFR BLD AUTO: 3.47 10*3/MM3 (ref 1.7–7)
NEUTROPHILS NFR BLD AUTO: 52.1 % (ref 42.7–76)
NRBC BLD AUTO-RTO: 0 /100 WBC (ref 0–0.2)
PLATELET # BLD AUTO: 433 10*3/MM3 (ref 140–450)
PMV BLD AUTO: 10 FL (ref 6–12)
RBC # BLD AUTO: 4.87 10*6/MM3 (ref 3.77–5.28)
WBC NRBC COR # BLD AUTO: 6.67 10*3/MM3 (ref 3.4–10.8)

## 2024-10-18 PROCEDURE — 1125F AMNT PAIN NOTED PAIN PRSNT: CPT | Performed by: NURSE PRACTITIONER

## 2024-10-18 PROCEDURE — 81025 URINE PREGNANCY TEST: CPT | Performed by: NURSE PRACTITIONER

## 2024-10-18 PROCEDURE — 84702 CHORIONIC GONADOTROPIN TEST: CPT

## 2024-10-18 PROCEDURE — 36415 COLL VENOUS BLD VENIPUNCTURE: CPT

## 2024-10-18 PROCEDURE — 99213 OFFICE O/P EST LOW 20 MIN: CPT | Performed by: NURSE PRACTITIONER

## 2024-10-18 PROCEDURE — 85025 COMPLETE CBC W/AUTO DIFF WBC: CPT | Performed by: NURSE PRACTITIONER

## 2024-10-18 PROCEDURE — 84703 CHORIONIC GONADOTROPIN ASSAY: CPT

## 2024-12-06 PROCEDURE — 87088 URINE BACTERIA CULTURE: CPT

## 2024-12-06 PROCEDURE — 87186 SC STD MICRODIL/AGAR DIL: CPT

## 2024-12-06 PROCEDURE — 87086 URINE CULTURE/COLONY COUNT: CPT

## 2024-12-08 ENCOUNTER — TELEPHONE (OUTPATIENT)
Dept: URGENT CARE | Facility: CLINIC | Age: 34
End: 2024-12-08
Payer: MEDICAID

## 2024-12-08 NOTE — TELEPHONE ENCOUNTER
----- Message from Fredy Almonte sent at 12/8/2024 11:13 AM EST -----  Patient is on Macrobid.  It is susceptible.

## 2024-12-11 ENCOUNTER — OFFICE VISIT (OUTPATIENT)
Dept: PSYCHIATRY | Facility: CLINIC | Age: 34
End: 2024-12-11
Payer: MEDICAID

## 2024-12-11 VITALS
HEART RATE: 80 BPM | OXYGEN SATURATION: 98 % | DIASTOLIC BLOOD PRESSURE: 82 MMHG | SYSTOLIC BLOOD PRESSURE: 129 MMHG | HEIGHT: 63 IN | WEIGHT: 269 LBS | BODY MASS INDEX: 47.66 KG/M2

## 2024-12-11 DIAGNOSIS — F43.10 POST TRAUMATIC STRESS DISORDER (PTSD): ICD-10-CM

## 2024-12-11 DIAGNOSIS — F41.1 GAD (GENERALIZED ANXIETY DISORDER): ICD-10-CM

## 2024-12-11 DIAGNOSIS — F33.41 RECURRENT MAJOR DEPRESSIVE DISORDER, IN PARTIAL REMISSION: Primary | ICD-10-CM

## 2024-12-11 PROBLEM — F33.1 MAJOR DEPRESSIVE DISORDER, RECURRENT, MODERATE: Status: RESOLVED | Noted: 2023-12-28 | Resolved: 2024-12-11

## 2024-12-11 RX ORDER — FLUOXETINE 40 MG/1
80 CAPSULE ORAL DAILY
Qty: 90 CAPSULE | Refills: 0 | Status: SHIPPED | OUTPATIENT
Start: 2024-12-11

## 2024-12-11 RX ORDER — PRAZOSIN HYDROCHLORIDE 1 MG/1
1 CAPSULE ORAL NIGHTLY
Qty: 90 CAPSULE | Refills: 0 | Status: SHIPPED | OUTPATIENT
Start: 2024-12-11

## 2024-12-11 RX ORDER — BUPROPION HYDROCHLORIDE 300 MG/1
300 TABLET ORAL EVERY MORNING
Qty: 90 TABLET | Refills: 0 | Status: SHIPPED | OUTPATIENT
Start: 2024-12-11
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
## 2024-12-12 DIAGNOSIS — F41.1 GAD (GENERALIZED ANXIETY DISORDER): ICD-10-CM

## 2024-12-12 RX ORDER — LORAZEPAM 1 MG/1
1 TABLET ORAL
Qty: 90 TABLET | Refills: 0 | OUTPATIENT
Start: 2024-12-12

## 2024-12-12 NOTE — TELEPHONE ENCOUNTER
REFILL REQUEST:     LORazepam (ATIVAN) 1 MG tablet (09/19/2024)    -MEDICATION WAS DISCONTINUED.    F/UP- 03/11/2025.  LOV: 12/11/2024.

## 2025-01-27 NOTE — PROGRESS NOTES
Follow Up Office Visit      Patient Name: Karine Swanson  : 1990   MRN: [id number]     Chief Complaint:    Chief Complaint   Patient presents with    Hyperlipidemia    Anxiety    Depression    Migraine         History of Present Illness: Karine Swanson is a 34 y.o. female who is here today to follow up for hyperlipidemia, anxiety, depression, IFG, migraine migrainemigraine     Patient would like to talk about weight loss , would like a referral to bariatric surgery      Currently psychiatry with behavioral health Dr. Kang     LMP  1/15/2025 becoming more pain, would like to try injection to help regular cycle   DIAGNOSTIC LAPAROSCOPY, LYSIS OF ADHESIONS completed 2024 by Dr. Kelly patient reports this helped pain syndrome and the heaviness of her cycle  Pap-2023  Mammogram- 2023  Labs-2024    Patient states she is having 1-2 migraine a week  it has improved a lot, imitrex resolves migraine     Pt c/o chronic low back pain radiating to right side  Now has insurance would like a referral to pain management  MRI LSPINE 2024 negative        Subjective      Review of Systems:   Review of Systems   Constitutional:  Negative for fever.   Respiratory:  Negative for cough.    Cardiovascular:  Negative for chest pain.   Gastrointestinal:  Negative for abdominal pain.   Genitourinary:  Positive for menstrual problem. Negative for dysuria.   Musculoskeletal:  Positive for back pain.        Past Medical History:   Past Medical History:   Diagnosis Date    Anxiety     Depression     Migraine     Ovarian cyst     Pelvic pain     Pre-diabetes        Past Surgical History:   Past Surgical History:   Procedure Laterality Date     SECTION N/A 3/25/2022    Procedure:  SECTION PRIMARY;  Surgeon: Avi Kelly MD;  Location: [facility] DELIVERY;  Service: Gynecology;  Laterality: N/A;    DIAGNOSTIC LAPAROSCOPY N/A 2024    Procedure: DIAGNOSTIC LAPAROSCOPY, LYSIS OF ADHESIONS;   Surgeon: Avi Kelly MD;  Location: [facility] MAIN OR;  Service: Gynecology;  Laterality: N/A;    LAPAROSCOPIC CHOLECYSTECTOMY      TONSILLECTOMY         Family History:   Family History   Problem Relation Age of Onset    No Known Problems Mother     No Known Problems Father     No Known Problems Sister     No Known Problems Brother     No Known Problems Maternal Aunt     No Known Problems Maternal Uncle     No Known Problems Paternal Aunt     No Known Problems Paternal Uncle     No Known Problems Maternal Grandmother     No Known Problems Maternal Grandfather     No Known Problems Paternal Grandmother     No Known Problems Paternal Grandfather     No Known Problems Cousin     No Known Problems Other     ADD / ADHD Neg Hx     Alcohol abuse Neg Hx     Anxiety disorder Neg Hx     Bipolar disorder Neg Hx     Dementia Neg Hx     Depression Neg Hx     Drug abuse Neg Hx     OCD Neg Hx     Paranoid behavior Neg Hx     Schizophrenia Neg Hx     Seizures Neg Hx     Self-Injurious Behavior  Neg Hx     Suicide Attempts Neg Hx     Breast cancer Neg Hx     Ovarian cancer Neg Hx     Uterine cancer Neg Hx     Colon cancer Neg Hx     Malig Hyperthermia Neg Hx        Social History:   Social History     Socioeconomic History    Marital status:    Tobacco Use    Smoking status: Never     Passive exposure: Never    Smokeless tobacco: Never   Vaping Use    Vaping status: Never Used   Substance and Sexual Activity    Alcohol use: Never    Drug use: Never    Sexual activity: Defer     Partners: Male     Birth control/protection: None, Birth control pill       Medications:     Current Outpatient Medications:     buPROPion XL (Wellbutrin XL) 300 MG 24 hr tablet, Take 1 tablet by mouth Every Morning., Disp: 90 tablet, Rfl: 0    FLUoxetine (PROzac) 40 MG capsule, Take 2 capsules by mouth Daily., Disp: 90 capsule, Rfl: 0    prazosin (MINIPRESS) 1 MG capsule, Take 1 capsule by mouth Every Night., Disp: 90 capsule, Rfl: 0     "SUMAtriptan (IMITREX) 25 MG tablet, Take one tablet at onset of headache. May repeat dose one time in 2 hours if headache not relieved., Disp: 30 tablet, Rfl: 3    tolterodine LA (DETROL LA) 2 MG 24 hr capsule, Take 1 capsule by mouth Daily., Disp: 90 capsule, Rfl: 1  No current facility-administered medications for this visit.    Allergies:   No Known Allergies        \    Objective     Physical Exam:  Vital Signs:   Vitals:    02/04/25 1358   BP: 129/85   Pulse: 85   Temp: 97.6 °F (36.4 °C)   SpO2: 98%   Weight: 123 kg (271 lb 9.6 oz)   Height: 160 cm (63\")   PainSc: 0-No pain     Body mass index is 48.11 kg/m².   BMI is >= 30 and <35. (Class 1 Obesity). The following options were offered after discussion;: exercise counseling/recommendations and nutrition counseling/recommendations       Physical Exam  Constitutional:       Appearance: She is obese.   Eyes:      Conjunctiva/sclera: Conjunctivae normal.   Neck:      Thyroid: No thyroid tenderness.      Vascular: No carotid bruit.   Cardiovascular:      Rate and Rhythm: Normal rate and regular rhythm.      Heart sounds: Normal heart sounds. No murmur heard.  Pulmonary:      Effort: Pulmonary effort is normal.      Breath sounds: Normal breath sounds.   Abdominal:      General: Bowel sounds are normal.      Palpations: Abdomen is soft.   Musculoskeletal:      Right lower leg: No edema.      Left lower leg: No edema.   Skin:     General: Skin is warm and dry.   Neurological:      Mental Status: She is alert.   Psychiatric:         Mood and Affect: Mood normal.         Behavior: Behavior normal.             Assessment / Plan      Assessment/Plan:   Diagnoses and all orders for this visit:    1. Mixed hyperlipidemia (Primary)  -     Lipid Panel  -     Ambulatory Referral to Bariatric Surgery    2. Impaired fasting blood sugar  -     Comprehensive Metabolic Panel  -     Hemoglobin A1c  -     Urinalysis With Culture If Indicated - Urine, Clean Catch  -     Ambulatory " "Referral to Bariatric Surgery    3. EMMA (generalized anxiety disorder)    4. Recurrent major depressive disorder, in partial remission    5. Migraine with aura and without status migrainosus, not intractable    6. Chronic bilateral low back pain with right-sided sciatica  -     Ambulatory Referral to Pain Management    7. Screening for thyroid disorder  -     TSH  -     T4, Free    8. Vitamin D deficiency  -     Vitamin D,25-Hydroxy    9. Class 3 severe obesity with serious comorbidity and body mass index (BMI) of 45.0 to 49.9 in adult, unspecified obesity type  -     Ambulatory Referral to Bariatric Surgery    10. Overactive bladder    11. Diarrhea, unspecified type  -     Celiac Disease Panel    12. Encounter for contraceptive management, unspecified type  -     MedroxyPROGESTERone Acetate (DEPO-PROVERA) injection 150 mg  -     POC Pregnancy, Urine    13. Menses painful    Other orders  -     tolterodine LA (DETROL LA) 2 MG 24 hr capsule; Take 1 capsule by mouth Daily.  Dispense: 90 capsule; Refill: 1    Lipidemia obtain lipid panel to monitor  Impaired fasting glucose obtain hemoglobin A1c to monitor with class III obesity impaired fasting glucose hyperlipidemia will refer to bariatric surgery  Anxiety depression currently managed by behavioral health  Migraines currently controlled less than 14 migraine days per month using abortive therapy Imitrex  Chronic low back pain with right-sided sciatica uncontrolled will refer to pain management MRI lumbar spine was negative  Vitamin D deficiency will obtain labs to monitor no current supplementation  Overactive bladder symptoms currently controlled with Detrol LA  Painful menses contraceptive management pregnancy test negative will start Depo-Provera per patient request      Follow Up:   Return in about 6 months (around 8/4/2025).    Dayana Yang, JEREMY    \"Please note that portions of this note were completed with a voice recognition program.\"     "

## 2025-02-04 ENCOUNTER — OFFICE VISIT (OUTPATIENT)
Dept: FAMILY MEDICINE CLINIC | Facility: CLINIC | Age: 35
End: 2025-02-04
Payer: MEDICAID

## 2025-02-04 VITALS
OXYGEN SATURATION: 98 % | DIASTOLIC BLOOD PRESSURE: 85 MMHG | WEIGHT: 271.6 LBS | BODY MASS INDEX: 48.12 KG/M2 | SYSTOLIC BLOOD PRESSURE: 129 MMHG | HEIGHT: 63 IN | HEART RATE: 85 BPM | TEMPERATURE: 97.6 F

## 2025-02-04 DIAGNOSIS — N94.6 MENSES PAINFUL: ICD-10-CM

## 2025-02-04 DIAGNOSIS — E78.2 MIXED HYPERLIPIDEMIA: Primary | ICD-10-CM

## 2025-02-04 DIAGNOSIS — R73.01 IMPAIRED FASTING BLOOD SUGAR: ICD-10-CM

## 2025-02-04 DIAGNOSIS — Z30.9 ENCOUNTER FOR CONTRACEPTIVE MANAGEMENT, UNSPECIFIED TYPE: ICD-10-CM

## 2025-02-04 DIAGNOSIS — R19.7 DIARRHEA, UNSPECIFIED TYPE: ICD-10-CM

## 2025-02-04 DIAGNOSIS — G43.109 MIGRAINE WITH AURA AND WITHOUT STATUS MIGRAINOSUS, NOT INTRACTABLE: ICD-10-CM

## 2025-02-04 DIAGNOSIS — E55.9 VITAMIN D DEFICIENCY: ICD-10-CM

## 2025-02-04 DIAGNOSIS — F41.1 GAD (GENERALIZED ANXIETY DISORDER): ICD-10-CM

## 2025-02-04 DIAGNOSIS — F33.41 RECURRENT MAJOR DEPRESSIVE DISORDER, IN PARTIAL REMISSION: ICD-10-CM

## 2025-02-04 DIAGNOSIS — E66.813 CLASS 3 SEVERE OBESITY WITH SERIOUS COMORBIDITY AND BODY MASS INDEX (BMI) OF 45.0 TO 49.9 IN ADULT, UNSPECIFIED OBESITY TYPE: ICD-10-CM

## 2025-02-04 DIAGNOSIS — G89.29 CHRONIC BILATERAL LOW BACK PAIN WITH RIGHT-SIDED SCIATICA: ICD-10-CM

## 2025-02-04 DIAGNOSIS — Z13.29 SCREENING FOR THYROID DISORDER: ICD-10-CM

## 2025-02-04 DIAGNOSIS — E66.01 CLASS 3 SEVERE OBESITY WITH SERIOUS COMORBIDITY AND BODY MASS INDEX (BMI) OF 45.0 TO 49.9 IN ADULT, UNSPECIFIED OBESITY TYPE: ICD-10-CM

## 2025-02-04 DIAGNOSIS — M54.41 CHRONIC BILATERAL LOW BACK PAIN WITH RIGHT-SIDED SCIATICA: ICD-10-CM

## 2025-02-04 DIAGNOSIS — N32.81 OVERACTIVE BLADDER: ICD-10-CM

## 2025-02-04 LAB
25(OH)D3 SERPL-MCNC: 27 NG/ML (ref 30–100)
ALBUMIN SERPL-MCNC: 4.2 G/DL (ref 3.5–5.2)
ALBUMIN/GLOB SERPL: 1.1 G/DL
ALP SERPL-CCNC: 102 U/L (ref 39–117)
ALT SERPL W P-5'-P-CCNC: 19 U/L (ref 1–33)
ANION GAP SERPL CALCULATED.3IONS-SCNC: 10.6 MMOL/L (ref 5–15)
AST SERPL-CCNC: 18 U/L (ref 1–32)
B-HCG UR QL: NEGATIVE
BILIRUB SERPL-MCNC: <0.2 MG/DL (ref 0–1.2)
BILIRUB UR QL STRIP: NEGATIVE
BUN SERPL-MCNC: 10 MG/DL (ref 6–20)
BUN/CREAT SERPL: 14.3 (ref 7–25)
CALCIUM SPEC-SCNC: 9.4 MG/DL (ref 8.6–10.5)
CHLORIDE SERPL-SCNC: 105 MMOL/L (ref 98–107)
CHOLEST SERPL-MCNC: 204 MG/DL (ref 0–200)
CLARITY UR: ABNORMAL
CO2 SERPL-SCNC: 23.4 MMOL/L (ref 22–29)
COLOR UR: YELLOW
CREAT SERPL-MCNC: 0.7 MG/DL (ref 0.57–1)
EGFRCR SERPLBLD CKD-EPI 2021: 117.3 ML/MIN/1.73
EXPIRATION DATE: NORMAL
GLOBULIN UR ELPH-MCNC: 3.7 GM/DL
GLUCOSE SERPL-MCNC: 92 MG/DL (ref 65–99)
GLUCOSE UR STRIP-MCNC: NEGATIVE MG/DL
HBA1C MFR BLD: 5.7 % (ref 4.8–5.6)
HDLC SERPL-MCNC: 42 MG/DL (ref 40–60)
HGB UR QL STRIP.AUTO: NEGATIVE
HOLD SPECIMEN: NORMAL
INTERNAL NEGATIVE CONTROL: NORMAL
INTERNAL POSITIVE CONTROL: NORMAL
KETONES UR QL STRIP: NEGATIVE
LDLC SERPL CALC-MCNC: 145 MG/DL (ref 0–100)
LDLC/HDLC SERPL: 3.41 {RATIO}
LEUKOCYTE ESTERASE UR QL STRIP.AUTO: ABNORMAL
Lab: NORMAL
NITRITE UR QL STRIP: NEGATIVE
PH UR STRIP.AUTO: 8.5 [PH] (ref 5–8)
POTASSIUM SERPL-SCNC: 3.8 MMOL/L (ref 3.5–5.2)
PROT SERPL-MCNC: 7.9 G/DL (ref 6–8.5)
PROT UR QL STRIP: ABNORMAL
SODIUM SERPL-SCNC: 139 MMOL/L (ref 136–145)
SP GR UR STRIP: 1.03 (ref 1–1.03)
T4 FREE SERPL-MCNC: 0.99 NG/DL (ref 0.92–1.68)
TRIGL SERPL-MCNC: 94 MG/DL (ref 0–150)
TSH SERPL DL<=0.05 MIU/L-ACNC: 1.69 UIU/ML (ref 0.27–4.2)
UROBILINOGEN UR QL STRIP: ABNORMAL
VLDLC SERPL-MCNC: 17 MG/DL (ref 5–40)

## 2025-02-04 PROCEDURE — 87086 URINE CULTURE/COLONY COUNT: CPT | Performed by: NURSE PRACTITIONER

## 2025-02-04 PROCEDURE — 84443 ASSAY THYROID STIM HORMONE: CPT | Performed by: NURSE PRACTITIONER

## 2025-02-04 PROCEDURE — 1126F AMNT PAIN NOTED NONE PRSNT: CPT | Performed by: NURSE PRACTITIONER

## 2025-02-04 PROCEDURE — 81001 URINALYSIS AUTO W/SCOPE: CPT | Performed by: NURSE PRACTITIONER

## 2025-02-04 PROCEDURE — 99214 OFFICE O/P EST MOD 30 MIN: CPT | Performed by: NURSE PRACTITIONER

## 2025-02-04 PROCEDURE — 82306 VITAMIN D 25 HYDROXY: CPT | Performed by: NURSE PRACTITIONER

## 2025-02-04 PROCEDURE — 80061 LIPID PANEL: CPT | Performed by: NURSE PRACTITIONER

## 2025-02-04 PROCEDURE — 86364 TISS TRNSGLTMNASE EA IG CLAS: CPT | Performed by: NURSE PRACTITIONER

## 2025-02-04 PROCEDURE — 81025 URINE PREGNANCY TEST: CPT | Performed by: NURSE PRACTITIONER

## 2025-02-04 PROCEDURE — 86231 EMA EACH IG CLASS: CPT | Performed by: NURSE PRACTITIONER

## 2025-02-04 PROCEDURE — 82784 ASSAY IGA/IGD/IGG/IGM EACH: CPT | Performed by: NURSE PRACTITIONER

## 2025-02-04 PROCEDURE — 84439 ASSAY OF FREE THYROXINE: CPT | Performed by: NURSE PRACTITIONER

## 2025-02-04 PROCEDURE — 96372 THER/PROPH/DIAG INJ SC/IM: CPT | Performed by: NURSE PRACTITIONER

## 2025-02-04 PROCEDURE — 83036 HEMOGLOBIN GLYCOSYLATED A1C: CPT | Performed by: NURSE PRACTITIONER

## 2025-02-04 PROCEDURE — 80053 COMPREHEN METABOLIC PANEL: CPT | Performed by: NURSE PRACTITIONER

## 2025-02-04 RX ORDER — MEDROXYPROGESTERONE ACETATE 150 MG/ML
150 INJECTION, SUSPENSION INTRAMUSCULAR ONCE
Status: COMPLETED | OUTPATIENT
Start: 2025-02-04 | End: 2025-02-04

## 2025-02-04 RX ORDER — TOLTERODINE 2 MG/1
2 CAPSULE, EXTENDED RELEASE ORAL DAILY
Qty: 90 CAPSULE | Refills: 1 | Status: SHIPPED | OUTPATIENT
Start: 2025-02-04

## 2025-02-04 RX ADMIN — MEDROXYPROGESTERONE ACETATE 150 MG: 150 INJECTION, SUSPENSION INTRAMUSCULAR at 15:30

## 2025-02-05 LAB
BACTERIA UR QL AUTO: ABNORMAL /HPF
ENDOMYSIUM IGA SER QL: NEGATIVE
HYALINE CASTS UR QL AUTO: ABNORMAL /LPF
IGA SERPL-MCNC: 174 MG/DL (ref 87–352)
RBC # UR STRIP: ABNORMAL /HPF
REF LAB TEST METHOD: ABNORMAL
SQUAMOUS #/AREA URNS HPF: ABNORMAL /HPF
TTG IGA SER-ACNC: <2 U/ML (ref 0–3)
WBC # UR STRIP: ABNORMAL /HPF

## 2025-02-06 LAB — BACTERIA SPEC AEROBE CULT: NO GROWTH

## 2025-02-27 ENCOUNTER — TELEPHONE (OUTPATIENT)
Dept: OBSTETRICS AND GYNECOLOGY | Facility: CLINIC | Age: 35
End: 2025-02-27
Payer: MEDICAID

## 2025-02-27 NOTE — TELEPHONE ENCOUNTER
Provider: DR GARCIA    Caller: Karine Swanson    Relationship to Patient: Self    Phone Number: [phone] THIS IS HER EMERGENCY CONTACT'S PH # PLEASE CALL AND SAY WE ARE NEEDING TO SPEAK TO HER AND PT WILL CALL US BACK - PT DOES NOT HAVE A PHONE.    Reason for Call: NEW OB PT CALLED TODAY TO MAKE APPT; LMP 01/18 SCHEDULED NEXT AVAIL 04/09; PT STATED SHE WENT TO HER PCP ON 02/04 AND HAD THE DEPO BIRTH CONTROL SHOT. PLEASE CALL PT TO ADVISE ABOUT THE DEPO PT SEEMED CONCERNED.

## 2025-02-28 DIAGNOSIS — O20.0 THREATENED ABORTION: Primary | ICD-10-CM

## 2025-03-04 ENCOUNTER — TELEPHONE (OUTPATIENT)
Dept: OBSTETRICS AND GYNECOLOGY | Facility: CLINIC | Age: 35
End: 2025-03-04
Payer: MEDICAID

## 2025-03-04 NOTE — TELEPHONE ENCOUNTER
See phone encounter dated 3/4/25.    Tried calling back and left a message. Person who answered states they will be in town Thursday and stop by the office. She will not be able to get over there until then due to other obligations. Advised to have Karine ask for me when she arrives.

## 2025-03-04 NOTE — TELEPHONE ENCOUNTER
Hub staff attempted to follow warm transfer process and was unsuccessful     Caller: Vicky Echols    Relationship to patient: Emergency Contact    Best call back number: [phone]     Patient is needing: VICKY RETURNED MISSED CALLS.PATIENT DOES NOT HAVE A PHONE. VICKY DROVE TO PATIENT'S HOME AND WILL STAY FOR A COUPLE MINUTES. HUB UNABLE TO TRANSFER CALL.   UNABLE TO ADDEND YESTERDAY'S TELEPHONE ENCOUNTER.

## 2025-03-06 ENCOUNTER — CLINICAL SUPPORT (OUTPATIENT)
Dept: OBSTETRICS AND GYNECOLOGY | Facility: CLINIC | Age: 35
End: 2025-03-06
Payer: MEDICAID

## 2025-03-06 DIAGNOSIS — O20.0 THREATENED ABORTION: ICD-10-CM

## 2025-03-06 LAB — HCG INTACT+B SERPL-ACNC: NORMAL MIU/ML

## 2025-03-06 PROCEDURE — 84702 CHORIONIC GONADOTROPIN TEST: CPT | Performed by: OBSTETRICS & GYNECOLOGY

## 2025-03-06 NOTE — TELEPHONE ENCOUNTER
Patient came in the office today. She was advised of recommendations and is to have HCG drawn today and to repeat on Saturday. She did states she initially gave the incorrect LMP and it was actually 1/11/25.

## 2025-03-06 NOTE — PROGRESS NOTES
Venipuncture Blood Specimen Collection  Venipuncture performed in right arm by Nicole Bales with good hemostasis. Patient tolerated the procedure well without complications.   03/06/25   Nicole Bales

## 2025-03-07 DIAGNOSIS — Z34.93 PREGNANCY WITH UNCERTAIN DATES IN THIRD TRIMESTER: Primary | ICD-10-CM

## 2025-03-08 ENCOUNTER — HOSPITAL ENCOUNTER (EMERGENCY)
Facility: HOSPITAL | Age: 35
Discharge: HOME | End: 2025-03-08
Attending: EMERGENCY MEDICINE
Payer: MEDICAID

## 2025-03-08 VITALS
DIASTOLIC BLOOD PRESSURE: 76 MMHG | OXYGEN SATURATION: 98 % | TEMPERATURE: 98 F | RESPIRATION RATE: 16 BRPM | SYSTOLIC BLOOD PRESSURE: 121 MMHG | HEART RATE: 93 BPM

## 2025-03-08 DIAGNOSIS — Z34.90 ENCOUNTER FOR PREGNANCY RELATED EXAMINATION, ANTEPARTUM: Primary | ICD-10-CM

## 2025-03-08 LAB — HCG INTACT+B SERPL-ACNC: NORMAL MIU/ML

## 2025-03-08 PROCEDURE — 36415 COLL VENOUS BLD VENIPUNCTURE: CPT

## 2025-03-08 PROCEDURE — 99283 EMERGENCY DEPT VISIT LOW MDM: CPT

## 2025-03-08 PROCEDURE — 84702 CHORIONIC GONADOTROPIN TEST: CPT

## 2025-03-08 RX ORDER — ONDANSETRON 4 MG/1
4 TABLET, ORALLY DISINTEGRATING ORAL 4 TIMES DAILY PRN
Qty: 30 TABLET | Refills: 0 | Status: SHIPPED | OUTPATIENT
Start: 2025-03-08

## 2025-03-08 NOTE — ED PROVIDER NOTES
Time: 10:14 AM EST  Date of encounter:  3/8/2025  Independent Historian/Clinical History and Information was obtained by:   Patient    History is limited by: N/A    Chief Complaint: Requesting labs      History of Present Illness:  Patient is a 34 y.o. year old female who presents to the emergency department for evaluation of requesting labs.  Patient presents the emergency department today requesting hCG level to be drawn.  She had levels drawn on 3 - 6 - 25 and from her OB visit they wanted this repeated in 48 hours.  Patient tried to go to outpatient lab however they are closed today due to software issues.  Patient's not had any vaginal bleeding.  She does states she has had some morning sickness and urgent care was post to send her some nausea medications but she states they never arrived to her pharmacy.  Patient believes she is approximately 8 weeks gestation.      Patient Care Team  Primary Care Provider: Zoë Yang APRN    Past Medical History:     No Known Allergies  Past Medical History:   Diagnosis Date    Anxiety     Depression     Migraine     Ovarian cyst     Pelvic pain     Pre-diabetes      Past Surgical History:   Procedure Laterality Date     SECTION N/A 3/25/2022    Procedure:  SECTION PRIMARY;  Surgeon: Avi Kelly MD;  Location: [facility] LABOR DELIVERY;  Service: Gynecology;  Laterality: N/A;    DIAGNOSTIC LAPAROSCOPY N/A 2024    Procedure: DIAGNOSTIC LAPAROSCOPY, LYSIS OF ADHESIONS;  Surgeon: Avi Kelly MD;  Location: [facility] MAIN OR;  Service: Gynecology;  Laterality: N/A;    LAPAROSCOPIC CHOLECYSTECTOMY      TONSILLECTOMY       Family History   Problem Relation Age of Onset    No Known Problems Mother     No Known Problems Father     No Known Problems Sister     No Known Problems Brother     No Known Problems Maternal Aunt     No Known Problems Maternal Uncle     No Known Problems Paternal Aunt     No Known Problems Paternal Uncle     No  Known Problems Maternal Grandmother     No Known Problems Maternal Grandfather     No Known Problems Paternal Grandmother     No Known Problems Paternal Grandfather     No Known Problems Cousin     No Known Problems Other     ADD / ADHD Neg Hx     Alcohol abuse Neg Hx     Anxiety disorder Neg Hx     Bipolar disorder Neg Hx     Dementia Neg Hx     Depression Neg Hx     Drug abuse Neg Hx     OCD Neg Hx     Paranoid behavior Neg Hx     Schizophrenia Neg Hx     Seizures Neg Hx     Self-Injurious Behavior  Neg Hx     Suicide Attempts Neg Hx     Breast cancer Neg Hx     Ovarian cancer Neg Hx     Uterine cancer Neg Hx     Colon cancer Neg Hx     Malig Hyperthermia Neg Hx        Home Medications:  Prior to Admission medications    Medication Sig Start Date End Date Taking? Authorizing Provider   buPROPion XL (Wellbutrin XL) 300 MG 24 hr tablet Take 1 tablet by mouth Every Morning. 12/11/24   Ancelmo Guzman MD   doxylamine-pyridoxine (DICLEGIS) 10-10 MG tablet delayed-release EC tablet Take 2 tablets by mouth 3 (Three) Times a Day As Needed (Nausea and vomiting) for up to 30 days. 2/27/25 3/29/25  Christal Yousif APRN   FLUoxetine (PROzac) 40 MG capsule Take 2 capsules by mouth Daily. 12/11/24   Ancelmo Guzman MD   prazosin (MINIPRESS) 1 MG capsule Take 1 capsule by mouth Every Night. 12/11/24   Ancelmo Guzman MD   SUMAtriptan (IMITREX) 25 MG tablet Take one tablet at onset of headache. May repeat dose one time in 2 hours if headache not relieved. 7/17/24   Zoë Yang APRN   tolterodine LA (DETROL LA) 2 MG 24 hr capsule Take 1 capsule by mouth Daily. 2/4/25   Zoë Yang APRN        Social History:   Social History     Tobacco Use    Smoking status: Never     Passive exposure: Never    Smokeless tobacco: Never   Vaping Use    Vaping status: Never Used   Substance Use Topics    Alcohol use: Never    Drug use: Never         Review of Systems:  Review of Systems    Gastrointestinal:  Positive for nausea.   Genitourinary:  Negative for pelvic pain and vaginal bleeding.        Physical Exam:  /76 (BP Location: Left arm, Patient Position: Sitting)   Pulse 93   Temp 98 °F (36.7 °C) (Oral)   Resp 16   LMP 01/18/2025 (Exact Date)   SpO2 98%     Physical Exam  Vitals and nursing note reviewed.   Constitutional:       General: She is not in acute distress.     Appearance: Normal appearance. She is not ill-appearing, toxic-appearing or diaphoretic.   HENT:      Head: Normocephalic and atraumatic.      Nose: Nose normal.   Eyes:      Conjunctiva/sclera: Conjunctivae normal.   Cardiovascular:      Rate and Rhythm: Normal rate and regular rhythm.      Heart sounds: Normal heart sounds.   Pulmonary:      Effort: Pulmonary effort is normal.      Breath sounds: Normal breath sounds.   Abdominal:      General: Abdomen is flat. There is no distension.   Musculoskeletal:         General: Normal range of motion.      Cervical back: Normal range of motion and neck supple.   Skin:     General: Skin is warm and dry.   Neurological:      General: No focal deficit present.      Mental Status: She is alert and oriented to person, place, and time.   Psychiatric:         Mood and Affect: Mood normal.         Behavior: Behavior normal.         Thought Content: Thought content normal.         Judgment: Judgment normal.                  Medical Decision Making:    Comorbidities that affect care:    Pregnancy    External Notes reviewed:    Previous Labs: hCG level completed on 3/6/25      The following orders were placed and all results were independently analyzed by me:  Orders Placed This Encounter   Procedures    hCG, Quantitative, Pregnancy       Medications Given in the Emergency Department:  Medications - No data to display     ED Course:    ED Course as of 03/08/25 1123   Sat Mar 08, 2025   1103 Patient needed to leave prior to hCG resulting because her ride was ready.  Patient does not  have telephone contact according to RN but there is contact listed in patient chart.  Patient is scheduled to follow-up with OB/GYN. [MD]      ED Course User Index  [MD] Fazal Jain PA-C       Labs:    Lab Results (last 24 hours)       Procedure Component Value Units Date/Time    hCG, Quantitative, Pregnancy [[id number]] Collected: 03/08/25 1019    Specimen: Blood Updated: 03/08/25 1121     HCG Quantitative 69,414.00 mIU/mL     Narrative:      HCG Ranges by Gestational Age    Females - non-pregnant premenopausal   </= 1mIU/mL HCG  Females - postmenopausal               </= 7mIU/mL HCG    3 Weeks       5.4   -      72 mIU/mL  4 Weeks      10.2   -     708 mIU/mL  5 Weeks       217   -   8,245 mIU/mL  6 Weeks       152   -  32,177 mIU/mL  7 Weeks     4,059   - 153,767 mIU/mL  8 Weeks    31,366   - 149,094 mIU/mL  9 Weeks    59,109   - 135,901 mIU/mL  10 Weeks   44,186   - 170,409 mIU/mL  12 Weeks   27,107   - 201,615 mIU/mL  14 Weeks   24,302   -  93,646 mIU/mL  15 Weeks   12,540   -  69,747 mIU/mL  16 Weeks    8,904   -  55,332 mIU/mL  17 Weeks    8,240   -  51,793 mIU/mL  18 Weeks    9,649   -  55,271 mIU/mL               Imaging:    No Radiology Exams Resulted Within Past 24 Hours      Differential Diagnosis and Discussion:    hCG labs: Differential diagnosis includes but is not limited to normal pregnancy, miscarriage    PROCEDURES:    Labs were collected in the emergency department and all labs were reviewed and interpreted by me.    No orders to display       Procedures    MDM  Number of Diagnoses or Management Options  Encounter for pregnancy related examination, antepartum  Diagnosis management comments: Patient presented to the emergency department today for evaluation of labs related to pregnancy.  She recently had them completed on 3-6/25 and noted to have a hCG level of 66,417.  Her OB/GYN had asked that she had these repeated 48 hours later and was unable to have these completed at outpatient lab  today so she came to the emergency department.  Patient had no pregnancy related complaints other than nausea and vomiting which I did provide her Zofran for her.  We attempted to wait for the hCG level to come back while patient was in the emergency department but she had to leave so that she had proper ride home prior to this resulting.  Repeat hCG level was 69,417.  Will have patient continue follow-up with OB/GYN as scheduled for ultrasound and prenatal visit.       Amount and/or Complexity of Data Reviewed  Clinical lab tests: reviewed and ordered  Decide to obtain previous medical records or to obtain history from someone other than the patient: yes    Risk of Complications, Morbidity, and/or Mortality  Presenting problems: moderate  Diagnostic procedures: low  Management options: low    Patient Progress  Patient progress: stable       Patient Care Considerations:    I considered pelvic ultrasound however patient has no pain or vaginal bleeding      Consultants/Shared Management Plan:    None    Social Determinants of Health:    Patient is independent, reliable, and has access to care.       Disposition and Care Coordination:    Discharged: The patient is suitable and stable for discharge with no need for consideration of admission.    I have explained the patient´s condition, diagnoses and treatment plan based on the information available to me at this time. I have answered questions and addressed any concerns. The patient has a good  understanding of the patient´s diagnosis, condition, and treatment plan as can be expected at this point. The vital signs have been stable. The patient´s condition is stable and appropriate for discharge from the emergency department.      The patient will pursue further outpatient evaluation with the primary care physician or other designated or consulting physician as outlined in the discharge instructions. They are agreeable to this plan of care and follow-up instructions  have been explained in detail. The patient has received these instructions in written format and has expressed an understanding of the discharge instructions. The patient is aware that any significant change in condition or worsening of symptoms should prompt an immediate return to this or the closest emergency department or call to 911.  I have explained discharge medications and the need for follow up with the patient/caretakers. This was also printed in the discharge instructions. Patient was discharged with the following medications and follow up:      Medication List        New Prescriptions      ondansetron ODT 4 MG disintegrating tablet  Commonly known as: ZOFRAN-ODT  Place 1 tablet on the tongue 4 (Four) Times a Day As Needed for Nausea or Vomiting.               Where to Get Your Medications        These medications were sent to [facility] - [place], [place] - [address] - [phone]  - [phone] FX  [address] [address] [place] [address]      Phone: [phone]   ondansetron ODT 4 MG disintegrating tablet      Avi Kelly MD  [facility] [place] [id number] [phone]    Schedule an appointment as soon as possible for a visit          Final diagnoses:   Encounter for pregnancy related examination, antepartum        ED Disposition       ED Disposition   Discharge    Condition   Stable    Comment   --               This medical record created using voice recognition software.             Fazal Jain PA-C  03/08/25 1129

## 2025-03-08 NOTE — DISCHARGE INSTRUCTIONS
We still do not have your blood pregnancy levels back however I am going to go ahead and discharge you because of your need to have a ride home.  You can check for the results on your MyChart later this evening, if there is concern for a drop in the hCG level I will contact you later today.  Return to the emergency department for pelvic pain, vaginal bleeding, other symptoms concerning to you.  I did send nausea medications to your pharmacy in [place].

## 2025-03-11 ENCOUNTER — OFFICE VISIT (OUTPATIENT)
Dept: PSYCHIATRY | Facility: CLINIC | Age: 35
End: 2025-03-11
Payer: MEDICAID

## 2025-03-11 VITALS
HEIGHT: 63 IN | BODY MASS INDEX: 47.09 KG/M2 | SYSTOLIC BLOOD PRESSURE: 123 MMHG | HEART RATE: 78 BPM | DIASTOLIC BLOOD PRESSURE: 76 MMHG | WEIGHT: 265.8 LBS

## 2025-03-11 DIAGNOSIS — F41.1 GAD (GENERALIZED ANXIETY DISORDER): ICD-10-CM

## 2025-03-11 DIAGNOSIS — F43.10 POST TRAUMATIC STRESS DISORDER (PTSD): ICD-10-CM

## 2025-03-11 DIAGNOSIS — F33.41 RECURRENT MAJOR DEPRESSIVE DISORDER, IN PARTIAL REMISSION: Primary | ICD-10-CM

## 2025-03-11 DIAGNOSIS — F33.41 RECURRENT MAJOR DEPRESSIVE DISORDER, IN PARTIAL REMISSION: ICD-10-CM

## 2025-03-11 RX ORDER — BUPROPION HYDROCHLORIDE 300 MG/1
300 TABLET ORAL EVERY MORNING
Qty: 90 TABLET | Refills: 0 | OUTPATIENT
Start: 2025-03-11

## 2025-03-11 RX ORDER — PRAZOSIN HYDROCHLORIDE 1 MG/1
1 CAPSULE ORAL NIGHTLY
Qty: 90 CAPSULE | Refills: 0 | OUTPATIENT
Start: 2025-03-11

## 2025-03-11 RX ORDER — FLUOXETINE HYDROCHLORIDE 40 MG/1
80 CAPSULE ORAL DAILY
Qty: 90 CAPSULE | Refills: 0 | Status: SHIPPED | OUTPATIENT
Start: 2025-03-11

## 2025-03-11 RX ORDER — FLUOXETINE HYDROCHLORIDE 40 MG/1
80 CAPSULE ORAL DAILY
Qty: 180 CAPSULE | Refills: 0 | OUTPATIENT
Start: 2025-03-11

## 2025-03-11 RX ORDER — PRAZOSIN HYDROCHLORIDE 1 MG/1
1 CAPSULE ORAL NIGHTLY
Qty: 90 CAPSULE | Refills: 0 | Status: SHIPPED | OUTPATIENT
Start: 2025-03-11

## 2025-03-11 RX ORDER — BUPROPION HYDROCHLORIDE 150 MG/1
TABLET ORAL
Qty: 30 TABLET | Refills: 0 | Status: SHIPPED | OUTPATIENT
Start: 2025-03-11

## 2025-03-11 NOTE — TREATMENT PLAN
Multi-Disciplinary Problems (from Behavioral Health Treatment Plan)      Active Problems       Problem:  Patient Care Overview (Adult)  Start Date: 03/11/25      Problem Details: Treatment Planning for Karine    Applicable diagnoses/problems:    - Depression: enhance motivation and interest with regard to ego-syntonic items of living via routine building and disruption of inhibitory executive cognitive circuits; challenge withdrawal from ego-syntonic items of living; cultivate ilana and satisfaction via a consistent practice of appreciation; consistently practice redirection from intrusive ego-dystonic thoughts towards actionable items to reduce influence these thoughts have in emotions and behavior.  - progress: good, maintaining  - frequency of intervention: 12 weeks  - duration of treatment: until optimized functional status is met    - Anxiety: enhance engagement with regard to ego-syntonic items of living via routine building and disruption of inhibitory executive cognitive circuits; challenge avoidance of ego-syntonic items of living via disruption to perceived barriers; reduce salience of fears and overwhelm with regard to their effects on thinking and behavior.  - progress: good, maintaining  - frequency of intervention: 12 weeks  - duration of treatment: until optimized functional status is met    - PTSD: reduce salience of trauma history with regard to its impact on thinking and behavior; work towards acceptance of that which has transpired not as acceptable, but has having happened; dampen emotional salience of trigger items via progressive desensitization; enhance motivation and interest with regard to ego-syntonic items of living via routine building and disruption of inhibitory executive cognitive circuits; challenge withdrawal from ego-syntonic items of living; cultivate ilana and satisfaction via a consistent practice of appreciation.  - progress: good, maintaining  - frequency of intervention: 12  weeks  - duration of treatment: until optimized functional status is met        Goal Priority Start Date Expected End Date End Date    Plan of Care Review -- 03/11/25 09/09/25 --
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
## 2025-03-11 NOTE — TELEPHONE ENCOUNTER
REFILL REQUEST:     FLUoxetine (PROzac) 40 MG capsule (12/11/2024)     buPROPion XL (Wellbutrin XL) 300 MG 24 hr tablet (12/11/2024)     prazosin (MINIPRESS) 1 MG capsule (12/11/2024)     F/UP- NONE IN EPIC.  LOV: 12/11/2024.

## 2025-03-12 ENCOUNTER — OFFICE VISIT (OUTPATIENT)
Dept: OBSTETRICS AND GYNECOLOGY | Facility: CLINIC | Age: 35
End: 2025-03-12
Payer: MEDICAID

## 2025-03-12 VITALS
DIASTOLIC BLOOD PRESSURE: 76 MMHG | HEART RATE: 73 BPM | SYSTOLIC BLOOD PRESSURE: 109 MMHG | HEIGHT: 63 IN | BODY MASS INDEX: 47.13 KG/M2 | WEIGHT: 266 LBS

## 2025-03-12 DIAGNOSIS — Z32.00 VISIT FOR CONFIRMATION OF PREGNANCY TEST RESULT WITH PHYSICAL EXAM: Primary | ICD-10-CM

## 2025-03-12 DIAGNOSIS — F51.04 INSOMNIA, PSYCHOPHYSIOLOGICAL: ICD-10-CM

## 2025-03-12 DIAGNOSIS — F41.1 GAD (GENERALIZED ANXIETY DISORDER): ICD-10-CM

## 2025-03-12 NOTE — PROGRESS NOTES
"[facility]  Gynecological Visit    CC: Follow-up ultrasound    Subjective:   34 y.o. who presents in follow-up of a dating ultrasound.  The patient denies any pelvic pain or vaginal bleeding.  The pregnancy is a significant surprise as she was just starting Depo-Provera through her PCP.  She thinks she may have been pregnant either when she got the injection or conceive just after the injection was given.  She denies any vaginal bleeding.  She does report nausea and vomiting.  She is currently taking Diclegis as well as Zofran.  This does help.    History:   Past medical history, medications, allergies, surgical history, social history, and obstetrical history all reviewed and updated.    Last Completed Pap Smear            Upcoming       PAP SMEAR (Every 3 Years) Next due on 2026  IgP, Aptima HPV    2022  IGP,CtNgTv,rfx Aptima HPV ASCU                          Objective:/76   Pulse 73   Ht 160 cm (63\")   Wt 121 kg (266 lb)   LMP 2025 (Exact Date)   BMI 47.12 kg/m²     Physical Exam  Vitals and nursing note reviewed.   Constitutional:       General: She is not in acute distress.     Appearance: Normal appearance. She is not ill-appearing.   HENT:      Head: Normocephalic and atraumatic.   Neck:      Thyroid: No thyroid mass or thyromegaly.   Abdominal:      General: Abdomen is flat. There is no distension.      Palpations: Abdomen is soft. There is no mass.      Tenderness: There is no abdominal tenderness. There is no guarding or rebound.   Musculoskeletal:         General: No swelling.      Right lower leg: No edema.      Left lower leg: No edema.   Skin:     General: Skin is warm and dry.      Findings: No rash.   Neurological:      Mental Status: She is alert and oriented to person, place, and time.   Psychiatric:         Mood and Affect: Mood normal.         Behavior: Behavior normal.         Thought Content: Thought content normal. "       Assessment and Plan:  Diagnoses and all orders for this visit:    1. Visit for confirmation of pregnancy test result with physical exam (Primary)  Assessment & Plan:  Today's dating ultrasound was reviewed.  The patient's EDC was confirmed.  Continue daily prenatal vitamin  We discussed that typically Depo-Provera is not felt to be harmful to the pregnancy.  There is no prevailing data showing increased risk for congenital anomaly or fetal loss.  Will continue to monitor closely.      2. Insomnia, psychophysiological  Assessment & Plan:  The patient is currently taking prazosin for sleep.  We have reviewed the risks of prazosin in pregnancy.  Again prevailing data shows no evidence of increased congenital anomalies with the use of prazosin.  We did discuss the potential of rebound hypertension if the medication is discontinued.  Will need to be mindful of this during the pregnancy.      3. EMMA (generalized anxiety disorder)  Assessment & Plan:  Patient currently takes Wellbutrin and fluoxetine.  We have discussed that both of those medicines are considered safe in pregnancy.  Data shows there is no expected increased risk in congenital anomalies.  We have discussed that exposure to these medications in third trimester can cause some withdrawal symptoms however the [employer] College of OB[employer]GY[employer] recommends that we not withheld these medications if they are needed.  Patient does feel like she does much better with her medications.  I recommend she continue both fluoxetine and the Wellbutrin.        Follow Up:  Return in about 4 weeks (around 4/9/2025) for inital ob.    Avi Kelly MD  03/12/2025

## 2025-03-13 PROBLEM — R19.7 DIARRHEA: Status: RESOLVED | Noted: 2024-05-15 | Resolved: 2025-03-13

## 2025-03-13 PROBLEM — R10.32 LLQ ABDOMINAL PAIN: Status: RESOLVED | Noted: 2024-05-15 | Resolved: 2025-03-13

## 2025-03-13 PROBLEM — Z32.00 VISIT FOR CONFIRMATION OF PREGNANCY TEST RESULT WITH PHYSICAL EXAM: Status: ACTIVE | Noted: 2025-03-13

## 2025-03-13 PROBLEM — Z48.89 POSTOPERATIVE VISIT: Status: RESOLVED | Noted: 2024-06-20 | Resolved: 2025-03-13

## 2025-03-13 PROBLEM — N94.6 MENSES PAINFUL: Status: RESOLVED | Noted: 2025-02-04 | Resolved: 2025-03-13

## 2025-03-13 PROBLEM — Z98.890 S/P LAPAROSCOPY: Status: RESOLVED | Noted: 2024-06-06 | Resolved: 2025-03-13

## 2025-03-13 PROBLEM — R30.0 DYSURIA: Status: RESOLVED | Noted: 2024-08-30 | Resolved: 2025-03-13

## 2025-03-13 PROBLEM — R11.0 NAUSEA: Status: RESOLVED | Noted: 2024-05-15 | Resolved: 2025-03-13

## 2025-03-13 PROBLEM — Z30.9 ENCOUNTER FOR CONTRACEPTIVE MANAGEMENT: Status: RESOLVED | Noted: 2024-04-22 | Resolved: 2025-03-13

## 2025-03-13 NOTE — ASSESSMENT & PLAN NOTE
The patient is currently taking prazosin for sleep.  We have reviewed the risks of prazosin in pregnancy.  Again prevailing data shows no evidence of increased congenital anomalies with the use of prazosin.  We did discuss the potential of rebound hypertension if the medication is discontinued.  Will need to be mindful of this during the pregnancy.

## 2025-03-13 NOTE — ASSESSMENT & PLAN NOTE
Today's dating ultrasound was reviewed.  The patient's EDC was confirmed.  Continue daily prenatal vitamin  We discussed that typically Depo-Provera is not felt to be harmful to the pregnancy.  There is no prevailing data showing increased risk for congenital anomaly or fetal loss.  Will continue to monitor closely.

## 2025-03-13 NOTE — ASSESSMENT & PLAN NOTE
Patient currently takes Wellbutrin and fluoxetine.  We have discussed that both of those medicines are considered safe in pregnancy.  Data shows there is no expected increased risk in congenital anomalies.  We have discussed that exposure to these medications in third trimester can cause some withdrawal symptoms however the [employer] of [employer]GY[employer] recommends that we not withheld these medications if they are needed.  Patient does feel like she does much better with her medications.  I recommend she continue both fluoxetine and the Wellbutrin.

## 2025-04-09 ENCOUNTER — INITIAL PRENATAL (OUTPATIENT)
Dept: OBSTETRICS AND GYNECOLOGY | Age: 35
End: 2025-04-09
Payer: MEDICAID

## 2025-04-09 VITALS — SYSTOLIC BLOOD PRESSURE: 107 MMHG | BODY MASS INDEX: 47.65 KG/M2 | WEIGHT: 269 LBS | DIASTOLIC BLOOD PRESSURE: 72 MMHG

## 2025-04-09 DIAGNOSIS — O34.219 PREVIOUS CESAREAN DELIVERY, ANTEPARTUM: ICD-10-CM

## 2025-04-09 DIAGNOSIS — Z34.80 SUPERVISION OF OTHER NORMAL PREGNANCY, ANTEPARTUM: Primary | ICD-10-CM

## 2025-04-09 DIAGNOSIS — F32.A DEPRESSION AFFECTING PREGNANCY: ICD-10-CM

## 2025-04-09 DIAGNOSIS — O99.210 OBESITY IN PREGNANCY, ANTEPARTUM: ICD-10-CM

## 2025-04-09 DIAGNOSIS — O99.340 DEPRESSION AFFECTING PREGNANCY: ICD-10-CM

## 2025-04-09 PROBLEM — Z32.00 VISIT FOR CONFIRMATION OF PREGNANCY TEST RESULT WITH PHYSICAL EXAM: Status: RESOLVED | Noted: 2025-03-13 | Resolved: 2025-04-09

## 2025-04-09 LAB
AMPHET+METHAMPHET UR QL: NEGATIVE
AMPHETAMINES UR QL: NEGATIVE
BARBITURATES UR QL SCN: NEGATIVE
BENZODIAZ UR QL SCN: NEGATIVE
BILE AC SERPL-SCNC: 6 UMOL/L (ref 0–10)
BUPRENORPHINE SERPL-MCNC: NEGATIVE NG/ML
CANNABINOIDS SERPL QL: NEGATIVE
COCAINE UR QL: NEGATIVE
FENTANYL UR-MCNC: NEGATIVE NG/ML
GLUCOSE UR STRIP-MCNC: NEGATIVE MG/DL
HCV AB SER QL: NORMAL
HIV 1+2 AB+HIV1 P24 AG SERPL QL IA: NORMAL
METHADONE UR QL SCN: NEGATIVE
OPIATES UR QL: NEGATIVE
OXYCODONE UR QL SCN: NEGATIVE
PCP UR QL SCN: NEGATIVE
PROT UR STRIP-MCNC: NEGATIVE MG/DL
TRICYCLICS UR QL SCN: NEGATIVE

## 2025-04-09 PROCEDURE — 82239 BILE ACIDS TOTAL: CPT | Performed by: OBSTETRICS & GYNECOLOGY

## 2025-04-09 PROCEDURE — G0123 SCREEN CERV/VAG THIN LAYER: HCPCS | Performed by: OBSTETRICS & GYNECOLOGY

## 2025-04-09 PROCEDURE — 86803 HEPATITIS C AB TEST: CPT | Performed by: OBSTETRICS & GYNECOLOGY

## 2025-04-09 PROCEDURE — 87086 URINE CULTURE/COLONY COUNT: CPT | Performed by: OBSTETRICS & GYNECOLOGY

## 2025-04-09 PROCEDURE — 87591 N.GONORRHOEAE DNA AMP PROB: CPT | Performed by: OBSTETRICS & GYNECOLOGY

## 2025-04-09 PROCEDURE — 80307 DRUG TEST PRSMV CHEM ANLYZR: CPT | Performed by: OBSTETRICS & GYNECOLOGY

## 2025-04-09 PROCEDURE — 86762 RUBELLA ANTIBODY: CPT | Performed by: OBSTETRICS & GYNECOLOGY

## 2025-04-09 PROCEDURE — G0432 EIA HIV-1/HIV-2 SCREEN: HCPCS | Performed by: OBSTETRICS & GYNECOLOGY

## 2025-04-09 PROCEDURE — 86592 SYPHILIS TEST NON-TREP QUAL: CPT | Performed by: OBSTETRICS & GYNECOLOGY

## 2025-04-09 PROCEDURE — 87661 TRICHOMONAS VAGINALIS AMPLIF: CPT | Performed by: OBSTETRICS & GYNECOLOGY

## 2025-04-09 PROCEDURE — 87491 CHLMYD TRACH DNA AMP PROBE: CPT | Performed by: OBSTETRICS & GYNECOLOGY

## 2025-04-09 RX ORDER — PRENATAL VIT NO.126/IRON/FOLIC 28MG-0.8MG
TABLET ORAL DAILY
COMMUNITY

## 2025-04-09 NOTE — PROGRESS NOTES
[facility]  OB Initial Visit    CC: Here for initial care of pregnancy     Subjective:  34 y.o.  presenting for her first obstetrical visit.  The patient reports her nausea is a a bit better.  She denies any vaginal bleeding.  Occasionally has some pelvic pain or discomfort.    LMP: Patient's last menstrual period was 2025 (exact date).     History:   Last Pap:   Last Completed Pap Smear            Awaiting Completion       PAP SMEAR (Every 3 Years) Order placed this encounter      2025  Order placed for IGP,CtNgTv,rfx Aptima HPV ASCU by Avi Kelly MD    2023  IgP, Aptima HPV    2022  IGP,CtNgTv,rfx Aptima HPV ASCU                          Past medical and surgical history, medications, allergies, social history, and OB/GYN history reviewed and updated. Preventative care history, history of abuse/safe environment, vaccine history,  genetic history reviewed and updated    OB History    Para Term  AB Living   5 4 3 1  5   SAB IAB Ectopic Molar Multiple Live Births       1 5      # Outcome Date GA Lbr Ron/2nd Weight Sex Type Anes PTL Lv   5 Current            4A  22 34w4d  2335 g (5 lb 2.4 oz) M CS-LTranv Spinal Y EVANGELINA   4B  22 34w4d  2660 g (5 lb 13.8 oz) M CS-LTranv Spinal Y EVANGELINA      Birth Comments: limp, blue, no resp effort, dry and stim with no response, started PPV at 40%, no response, increased to 100%   3 Term 20 39w0d   F Vaginal unsp   EVANGELINA   2 Term 10/30/18 40w0d  3912 g (8 lb 10 oz) F Vag-Spont   EVANGELINA   1 Term 17 40w0d   M Vaginal unsp   EVANGELINA     Objective:  /72   Wt 122 kg (269 lb)   LMP 2025 (Exact Date)   BMI 47.65 kg/m²     Physical Exam  Vitals and nursing note reviewed. Exam conducted with a chaperone present.   Constitutional:       General: She is not in acute distress.     Appearance: Normal appearance. She is not ill-appearing.   HENT:      Head: Normocephalic and  atraumatic.      Mouth/Throat:      Mouth: Mucous membranes are moist.      Pharynx: Oropharynx is clear.   Eyes:      Extraocular Movements: Extraocular movements intact.   Cardiovascular:      Rate and Rhythm: Normal rate and regular rhythm.   Pulmonary:      Effort: Pulmonary effort is normal. No respiratory distress.      Breath sounds: Normal breath sounds. No wheezing.   Abdominal:      General: Abdomen is flat. There is no distension.      Palpations: Abdomen is soft. There is no mass.      Tenderness: There is no abdominal tenderness. There is no guarding or rebound.      Hernia: No hernia is present. There is no hernia in the left inguinal area or right inguinal area.   Genitourinary:     General: Normal vulva.      Exam position: Lithotomy position.      Pubic Area: No rash.       Labia:         Right: No rash, tenderness, lesion or injury.         Left: No rash, tenderness, lesion or injury.       Urethra: No prolapse, urethral pain or urethral lesion.      Vagina: No signs of injury and foreign body. No vaginal discharge, erythema, tenderness or bleeding.      Cervix: No cervical motion tenderness, discharge, friability, lesion, erythema or cervical bleeding.      Uterus: Enlarged. Not fixed, not tender and no uterine prolapse.       Adnexa:         Right: No mass or tenderness.          Left: No mass or tenderness.        Comments: The uterus is gravid and approximately 12 weeks in size  Musculoskeletal:         General: No swelling.      Right lower leg: No edema.      Left lower leg: No edema.   Skin:     General: Skin is warm and dry.      Findings: No rash.   Neurological:      Mental Status: She is alert and oriented to person, place, and time.   Psychiatric:         Mood and Affect: Mood normal.         Behavior: Behavior normal.         Thought Content: Thought content normal.         Judgment: Judgment normal.       Assessment and Plan:  Diagnoses and all orders for this visit:    1. Supervision  of other normal pregnancy, antepartum (Primary)  Overview:  EDC: 10/18/2025    Prenatal genetic screening: Declined    COVID-19 vaccine: Recommend  Flu vaccine: Recommend  DTaP vaccine: Recommend    Assessment & Plan:  Continue prenatal vitamins  Check prenatal labs  Dating ultrasound reviewed and EDC finalized  Anatomy ultrasound in 8 weeks  Discussed office visit schedule and call rotation  Reviewed medication safe in pregnancy  Declines prenatal genetic screening  Reviewed nutrition, exercise, and appropriate weight gain in pregnancy    Orders:  -     POC Urinalysis Dipstick  -     Urine Drug Screen - Urine, Clean Catch  -     Urine Culture - Urine, Urine, Clean Catch  -     IGP,CtNgTv,rfx Aptima HPV ASCU  -     OB Panel With HIV  -     Bile Acids, Total  -     Comprehensive Metabolic Panel  -     US Ob Detail Fetal Anatomy Single or First Gestation; Future    2. Previous  delivery, antepartum  Overview:  [facility]  calculator: 55% chance of success    Assessment & Plan:  The patient is interested in trial of labor after  delivery.  The risk, benefits, alternatives were discussed including the risks of uterine rupture.  We discussed if uterine rupture occurred this is a very serious complication that greatly increases the likelihood of permanent fetal injury including permanent brain damage and/or fetal death.  This also increases maternal morbidity and risk for maternal death.  The patient is going to consider her options and make a final decision as we move through the pregnancy.  We will continue to .      3. Obesity in pregnancy, antepartum  Assessment & Plan:  Discussed at her size, nutrition and appropriate weight gain in pregnancy.      4. Depression affecting pregnancy  Assessment & Plan:  Currently take Wellbutrin, Prozac and prazosin.  The risk, benefits, alternatives of each these were discussed.  The patient really feels she needs to remain on these medications due to her  severity of her depression.  Continue Wellbutrin 150 mg daily.  Fluoxetine 80 mg daily and prazosin 1 mg nightly        12w4d    Genetic Screening: Counseled.  Declines all.     Vaccines: Recommend FLU vaccine this season, R/B discussed  Recommend COVID vaccine, R/B discussed    Counseling: Nutrition discussed, calories, activity/exercise in pregnancy  Discussed dietary restrictions/safety food preparation in pregnancy  Reviewed what to expect prenatal visits, office providers (female and male) and covering [facility] Hospitalists  Appropriate trimester precautions provided, N/V, vag bleeding, cramping  VACCINE importance in pregnancy discussed.  Maternal and fetal risk of not being vaccinated reviewed NLT increased risk maternal/fetal severity of illness/death, PTD, CS, hemorrhage, HTN, possible IUFD.  Significant maternal and fetal/infant benefit w vaccination.  FDA approval and ACOG/SMFM/CDC strong recommendation in pregnancy.  Questions answered.   Questions answered    Return in about 4 weeks (around 5/7/2025) for Recheck.    Avi Kelly MD  04/09/2025

## 2025-04-09 NOTE — ASSESSMENT & PLAN NOTE
Currently take Wellbutrin, Prozac and prazosin.  The risk, benefits, alternatives of each these were discussed.  The patient really feels she needs to remain on these medications due to her severity of her depression.  Continue Wellbutrin 150 mg daily.  Fluoxetine 80 mg daily and prazosin 1 mg nightly

## 2025-04-09 NOTE — ASSESSMENT & PLAN NOTE
Continue prenatal vitamins  Check prenatal labs  Dating ultrasound reviewed and EDC finalized  Anatomy ultrasound in 8 weeks  Discussed office visit schedule and call rotation  Reviewed medication safe in pregnancy  Declines prenatal genetic screening  Reviewed nutrition, exercise, and appropriate weight gain in pregnancy

## 2025-04-09 NOTE — ASSESSMENT & PLAN NOTE
The patient is interested in trial of labor after  delivery.  The risk, benefits, alternatives were discussed including the risks of uterine rupture.  We discussed if uterine rupture occurred this is a very serious complication that greatly increases the likelihood of permanent fetal injury including permanent brain damage and/or fetal death.  This also increases maternal morbidity and risk for maternal death.  The patient is going to consider her options and make a final decision as we move through the pregnancy.  We will continue to .

## 2025-04-10 LAB — RPR SER QL: NORMAL

## 2025-04-11 LAB
BACTERIA SPEC AEROBE CULT: NORMAL
RUBV IGG SERPL IA-ACNC: <0.9 INDEX

## 2025-04-14 LAB
C TRACH RRNA CVX QL NAA+PROBE: NEGATIVE
CONV .: NORMAL
CYTOLOGIST CVX/VAG CYTO: NORMAL
CYTOLOGY CVX/VAG DOC CYTO: NORMAL
CYTOLOGY CVX/VAG DOC THIN PREP: NORMAL
DX ICD CODE: NORMAL
N GONORRHOEA RRNA CVX QL NAA+PROBE: NEGATIVE
OTHER STN SPEC: NORMAL
SERVICE CMNT-IMP: NORMAL
STAT OF ADQ CVX/VAG CYTO-IMP: NORMAL
T VAGINALIS RRNA SPEC QL NAA+PROBE: NEGATIVE

## 2025-04-14 NOTE — ASSESSMENT & PLAN NOTE
Discussed exercise, nutrition and recommended weight loss   Received a fax from [email or web address] stating that Minor's actemra injection is approved from 1/20/2025-7/20/2025.  Auth #: [id number]  Phone:[phone] [phone]

## 2025-05-06 ENCOUNTER — CLINICAL SUPPORT (OUTPATIENT)
Dept: OBSTETRICS AND GYNECOLOGY | Age: 35
End: 2025-05-06
Payer: MEDICAID

## 2025-05-06 DIAGNOSIS — Z34.80 SUPERVISION OF OTHER NORMAL PREGNANCY, ANTEPARTUM: Primary | ICD-10-CM

## 2025-05-06 LAB
ABO GROUP BLD: NORMAL
BLD GP AB SCN SERPL QL: NEGATIVE
RH BLD: POSITIVE

## 2025-05-06 PROCEDURE — 86900 BLOOD TYPING SEROLOGIC ABO: CPT | Performed by: OBSTETRICS & GYNECOLOGY

## 2025-05-06 PROCEDURE — 85025 COMPLETE CBC W/AUTO DIFF WBC: CPT | Performed by: OBSTETRICS & GYNECOLOGY

## 2025-05-06 PROCEDURE — 80053 COMPREHEN METABOLIC PANEL: CPT | Performed by: OBSTETRICS & GYNECOLOGY

## 2025-05-06 PROCEDURE — 86850 RBC ANTIBODY SCREEN: CPT | Performed by: OBSTETRICS & GYNECOLOGY

## 2025-05-06 PROCEDURE — 86901 BLOOD TYPING SEROLOGIC RH(D): CPT | Performed by: OBSTETRICS & GYNECOLOGY

## 2025-05-06 PROCEDURE — 87340 HEPATITIS B SURFACE AG IA: CPT | Performed by: OBSTETRICS & GYNECOLOGY

## 2025-05-06 NOTE — PROGRESS NOTES
Venipuncture Blood Specimen Collection  Venipuncture performed in right arm by Nicole Bales with good hemostasis. Patient tolerated the procedure well without complications.   05/06/25   Nicole Bales

## 2025-05-07 LAB
ALBUMIN SERPL-MCNC: 3.6 G/DL (ref 3.5–5.2)
ALBUMIN/GLOB SERPL: 1.1 G/DL
ALP SERPL-CCNC: 83 U/L (ref 39–117)
ALT SERPL W P-5'-P-CCNC: 15 U/L (ref 1–33)
ANION GAP SERPL CALCULATED.3IONS-SCNC: 15.9 MMOL/L (ref 5–15)
AST SERPL-CCNC: 19 U/L (ref 1–32)
BASOPHILS # BLD AUTO: 0.04 10*3/MM3 (ref 0–0.2)
BASOPHILS NFR BLD AUTO: 0.4 % (ref 0–1.5)
BILIRUB SERPL-MCNC: <0.2 MG/DL (ref 0–1.2)
BUN SERPL-MCNC: 6 MG/DL (ref 6–20)
BUN/CREAT SERPL: 9.5 (ref 7–25)
CALCIUM SPEC-SCNC: 9.4 MG/DL (ref 8.6–10.5)
CHLORIDE SERPL-SCNC: 103 MMOL/L (ref 98–107)
CO2 SERPL-SCNC: 15.1 MMOL/L (ref 22–29)
CREAT SERPL-MCNC: 0.63 MG/DL (ref 0.57–1)
DEPRECATED RDW RBC AUTO: 44.4 FL (ref 37–54)
EGFRCR SERPLBLD CKD-EPI 2021: 120.3 ML/MIN/1.73
EOSINOPHIL # BLD AUTO: 0.18 10*3/MM3 (ref 0–0.4)
EOSINOPHIL NFR BLD AUTO: 1.7 % (ref 0.3–6.2)
ERYTHROCYTE [DISTWIDTH] IN BLOOD BY AUTOMATED COUNT: 13.9 % (ref 12.3–15.4)
GLOBULIN UR ELPH-MCNC: 3.4 GM/DL
GLUCOSE SERPL-MCNC: 97 MG/DL (ref 65–99)
HBV SURFACE AG SERPL QL IA: NORMAL
HCT VFR BLD AUTO: 39.8 % (ref 34–46.6)
HGB BLD-MCNC: 12.3 G/DL (ref 12–15.9)
IMM GRANULOCYTES # BLD AUTO: 0.07 10*3/MM3 (ref 0–0.05)
IMM GRANULOCYTES NFR BLD AUTO: 0.7 % (ref 0–0.5)
LYMPHOCYTES # BLD AUTO: 2.64 10*3/MM3 (ref 0.7–3.1)
LYMPHOCYTES NFR BLD AUTO: 25.1 % (ref 19.6–45.3)
MCH RBC QN AUTO: 27.3 PG (ref 26.6–33)
MCHC RBC AUTO-ENTMCNC: 30.9 G/DL (ref 31.5–35.7)
MCV RBC AUTO: 88.2 FL (ref 79–97)
MONOCYTES # BLD AUTO: 0.46 10*3/MM3 (ref 0.1–0.9)
MONOCYTES NFR BLD AUTO: 4.4 % (ref 5–12)
NEUTROPHILS NFR BLD AUTO: 67.7 % (ref 42.7–76)
NEUTROPHILS NFR BLD AUTO: 7.13 10*3/MM3 (ref 1.7–7)
NRBC BLD AUTO-RTO: 0 /100 WBC (ref 0–0.2)
PLATELET # BLD AUTO: 414 10*3/MM3 (ref 140–450)
PMV BLD AUTO: 10.6 FL (ref 6–12)
POTASSIUM SERPL-SCNC: 4.1 MMOL/L (ref 3.5–5.2)
PROT SERPL-MCNC: 7 G/DL (ref 6–8.5)
RBC # BLD AUTO: 4.51 10*6/MM3 (ref 3.77–5.28)
SODIUM SERPL-SCNC: 134 MMOL/L (ref 136–145)
WBC NRBC COR # BLD AUTO: 10.52 10*3/MM3 (ref 3.4–10.8)

## 2025-05-14 ENCOUNTER — REFERRAL TRIAGE (OUTPATIENT)
Dept: LABOR AND DELIVERY | Facility: HOSPITAL | Age: 35
End: 2025-05-14
Payer: MEDICAID

## 2025-05-16 ENCOUNTER — ROUTINE PRENATAL (OUTPATIENT)
Dept: OBSTETRICS AND GYNECOLOGY | Age: 35
End: 2025-05-16
Payer: MEDICAID

## 2025-05-16 VITALS — WEIGHT: 276 LBS | BODY MASS INDEX: 48.89 KG/M2 | DIASTOLIC BLOOD PRESSURE: 72 MMHG | SYSTOLIC BLOOD PRESSURE: 106 MMHG

## 2025-05-16 DIAGNOSIS — O99.340 DEPRESSION AFFECTING PREGNANCY: ICD-10-CM

## 2025-05-16 DIAGNOSIS — Z34.80 SUPERVISION OF OTHER NORMAL PREGNANCY, ANTEPARTUM: Primary | ICD-10-CM

## 2025-05-16 DIAGNOSIS — O34.219 PREVIOUS CESAREAN DELIVERY, ANTEPARTUM: ICD-10-CM

## 2025-05-16 DIAGNOSIS — O99.210 OBESITY IN PREGNANCY, ANTEPARTUM: ICD-10-CM

## 2025-05-16 DIAGNOSIS — F32.A DEPRESSION AFFECTING PREGNANCY: ICD-10-CM

## 2025-05-16 LAB
GLUCOSE UR STRIP-MCNC: NEGATIVE MG/DL
PROT UR STRIP-MCNC: NEGATIVE MG/DL

## 2025-05-16 NOTE — ASSESSMENT & PLAN NOTE
Stable.  Continue follow-up with her mental health provider.  Continue Prozac 80 mg daily and Wellbutrin  mg daily.  Continue prazosin 1 mg before bed at night

## 2025-05-16 NOTE — PROGRESS NOTES
[facility]  OB Follow Up Visit    CC: Routine obstetrical visit    Prenatal care complicated by:  Patient Active Problem List   Diagnosis    Obesity (BMI 30-39.9)    Class 3 severe obesity due to excess calories with serious comorbidity and body mass index (BMI) of 45.0 to 49.9 in adult    EMMA (generalized anxiety disorder)    Impaired fasting blood sugar    Mixed hyperlipidemia    Overactive bladder    Post traumatic stress disorder (PTSD)    Nightmares associated with chronic post-traumatic stress disorder    Insomnia, psychophysiological    Chronic bilateral low back pain with right-sided sciatica    Family history of colon cancer    Pelvic adhesions    Migraine with aura and without status migrainosus, not intractable    Recurrent major depressive disorder, in partial remission    Supervision of other normal pregnancy, antepartum    Previous  delivery, antepartum    Obesity in pregnancy, antepartum    Depression affecting pregnancy     Subjective:   Karine Swanson is a 34 y.o.  17w6d patient being seen today for her obstetrical follow up visit. The patient has: No complaints, No leaking fluid, No vaginal bleeding, No contractions, Adequate FM    History: Past medical and surgical history, medications, allergies, social history, and obstetrical history all reviewed and updated.    Objective:    Urine glucose/protein - See OB flow sheet      /72   LMP 2025 (Exact Date)     General exam: Comfortable, NAD  FHR: 153 BPM   Uterine Size: size equals dates  Pelvic Exam: No    Assessment and Plan:  Diagnoses and all orders for this visit:    1. Supervision of other normal pregnancy, antepartum (Primary)  Overview:  EDC: 10/18/2025    Prenatal genetic screening: Declined    COVID-19 vaccine: Recommend  Flu vaccine: Recommend  DTaP vaccine: Recommend    Orders:  -     POC Urinalysis Dipstick      17w6d  Reassuring pregnancy progress    Counseling: Second trimester precautions  OB  precautions, leaking, VB, hang rashid vs PTL/Labor    Questions answered    No follow-ups on file.    Avi Kelly MD  05/16/2025

## 2025-06-04 ENCOUNTER — ROUTINE PRENATAL (OUTPATIENT)
Dept: OBSTETRICS AND GYNECOLOGY | Age: 35
End: 2025-06-04
Payer: MEDICAID

## 2025-06-04 VITALS — BODY MASS INDEX: 49.25 KG/M2 | SYSTOLIC BLOOD PRESSURE: 119 MMHG | DIASTOLIC BLOOD PRESSURE: 71 MMHG | WEIGHT: 278 LBS

## 2025-06-04 DIAGNOSIS — O34.219 PREVIOUS CESAREAN DELIVERY, ANTEPARTUM: ICD-10-CM

## 2025-06-04 DIAGNOSIS — F32.A DEPRESSION AFFECTING PREGNANCY: ICD-10-CM

## 2025-06-04 DIAGNOSIS — Z34.80 SUPERVISION OF OTHER NORMAL PREGNANCY, ANTEPARTUM: Primary | ICD-10-CM

## 2025-06-04 DIAGNOSIS — O99.210 OBESITY IN PREGNANCY, ANTEPARTUM: ICD-10-CM

## 2025-06-04 DIAGNOSIS — O99.340 DEPRESSION AFFECTING PREGNANCY: ICD-10-CM

## 2025-06-04 NOTE — PROGRESS NOTES
[facility]  OB Follow Up Visit    CC: Routine obstetrical visit    Prenatal care complicated by:  Patient Active Problem List   Diagnosis    Obesity (BMI 30-39.9)    Class 3 severe obesity due to excess calories with serious comorbidity and body mass index (BMI) of 45.0 to 49.9 in adult    EMMA (generalized anxiety disorder)    Impaired fasting blood sugar    Mixed hyperlipidemia    Overactive bladder    Post traumatic stress disorder (PTSD)    Nightmares associated with chronic post-traumatic stress disorder    Insomnia, psychophysiological    Chronic bilateral low back pain with right-sided sciatica    Family history of colon cancer    Pelvic adhesions    Migraine with aura and without status migrainosus, not intractable    Recurrent major depressive disorder, in partial remission    Supervision of other normal pregnancy, antepartum    Previous  delivery, antepartum    Obesity in pregnancy, antepartum    Depression affecting pregnancy     Subjective:   Karine Swanson is a 34 y.o.  20w4d patient being seen today for her obstetrical follow up visit. The patient has: No complaints, No leaking fluid, No vaginal bleeding, No contractions, Adequate FM    History: Past medical and surgical history, medications, allergies, social history, and obstetrical history all reviewed and updated.    Objective:    Urine glucose/protein - See OB flow sheet      /71   Wt 126 kg (278 lb)   LMP 2025 (Exact Date)   BMI 49.25 kg/m²     General exam: Comfortable, NAD  FHR: 149 BPM   Uterine Size: 20 cm  Pelvic Exam: No    Assessment and Plan:  Diagnoses and all orders for this visit:    1. Supervision of other normal pregnancy, antepartum (Primary)  Overview:  EDC: 10/18/2025    Prenatal genetic screening: Declined    COVID-19 vaccine: Recommend  Flu vaccine: Recommend  DTaP vaccine: Recommend    Assessment & Plan:  Reviewed today's anatomy ultrasound.  Today's anatomy is normal however there are  incomplete cardiac views.  Placenta is anterior.  No evidence of previa.  Adequate amniotic fluid volume.  Recommend follow-up study in 4 weeks to complete the anatomical survey  1 hour GTT next office visit  Continue daily prenatal vitamin    Orders:  -     POC Urinalysis Dipstick  -     US Ob 14 + Weeks Single or First Gestation; Future    2. Previous  delivery, antepartum  Overview:  X 1: Twins  [facility]  calculator: 55% chance of success  Placenta: Anterior      3. Obesity in pregnancy, antepartum  Assessment & Plan:  Previously counseled      4. Depression affecting pregnancy  Assessment & Plan:  Stable.  Continue Wellbutrin  mg daily.  Continue Minipress 1 mg oral before bed.        20w4d  Reassuring pregnancy progress    Counseling: Second trimester precautions  OB precautions, leaking, VB, hang rashid vs PTL/Labor    Questions answered    Return in about 4 weeks (around 2025) for Recheck.    Avi Kelly MD  2025

## 2025-06-05 NOTE — ASSESSMENT & PLAN NOTE
Reviewed today's anatomy ultrasound.  Today's anatomy is normal however there are incomplete cardiac views.  Placenta is anterior.  No evidence of previa.  Adequate amniotic fluid volume.  Recommend follow-up study in 4 weeks to complete the anatomical survey  1 hour GTT next office visit  Continue daily prenatal vitamin
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
## 2025-06-11 ENCOUNTER — OFFICE VISIT (OUTPATIENT)
Dept: PSYCHIATRY | Facility: CLINIC | Age: 35
End: 2025-06-11
Payer: MEDICAID

## 2025-06-11 VITALS
WEIGHT: 285.4 LBS | HEART RATE: 97 BPM | SYSTOLIC BLOOD PRESSURE: 121 MMHG | HEIGHT: 63 IN | BODY MASS INDEX: 50.57 KG/M2 | DIASTOLIC BLOOD PRESSURE: 78 MMHG

## 2025-06-11 DIAGNOSIS — F43.10 POST TRAUMATIC STRESS DISORDER (PTSD): Primary | ICD-10-CM

## 2025-06-11 DIAGNOSIS — F41.1 GAD (GENERALIZED ANXIETY DISORDER): ICD-10-CM

## 2025-06-11 DIAGNOSIS — F33.41 RECURRENT MAJOR DEPRESSIVE DISORDER, IN PARTIAL REMISSION: ICD-10-CM

## 2025-06-11 RX ORDER — FLUOXETINE HYDROCHLORIDE 40 MG/1
80 CAPSULE ORAL DAILY
Qty: 90 CAPSULE | Refills: 0 | Status: SHIPPED | OUTPATIENT
Start: 2025-06-11

## 2025-06-11 RX ORDER — PRAZOSIN HYDROCHLORIDE 1 MG/1
1 CAPSULE ORAL NIGHTLY
Qty: 90 CAPSULE | Refills: 0 | Status: SHIPPED | OUTPATIENT
Start: 2025-06-11

## 2025-06-24 ENCOUNTER — TELEPHONE (OUTPATIENT)
Dept: PSYCHIATRY | Facility: CLINIC | Age: 35
End: 2025-06-24
Payer: MEDICAID

## 2025-06-24 DIAGNOSIS — F33.41 RECURRENT MAJOR DEPRESSIVE DISORDER, IN PARTIAL REMISSION: ICD-10-CM

## 2025-06-24 RX ORDER — BUPROPION HYDROCHLORIDE 150 MG/1
TABLET ORAL
Qty: 30 TABLET | Refills: 0 | OUTPATIENT
Start: 2025-06-24

## 2025-06-24 RX ORDER — FLUOXETINE HYDROCHLORIDE 40 MG/1
80 CAPSULE ORAL DAILY
Qty: 180 CAPSULE | Refills: 0 | Status: SHIPPED | OUTPATIENT
Start: 2025-06-24

## 2025-06-24 NOTE — TELEPHONE ENCOUNTER
PHARMACY PHONED IN AND LVM.    THEY ASK IF PROVIDER CAN CHANGE THE QUANTITY OF PTS PROZAC PRESCRIPTION .    FLUoxetine (PROzac) 40 MG capsule (06/11/2025)

## 2025-07-02 ENCOUNTER — ROUTINE PRENATAL (OUTPATIENT)
Dept: OBSTETRICS AND GYNECOLOGY | Age: 35
End: 2025-07-02
Payer: MEDICAID

## 2025-07-02 VITALS — WEIGHT: 280 LBS | BODY MASS INDEX: 49.6 KG/M2 | DIASTOLIC BLOOD PRESSURE: 70 MMHG | SYSTOLIC BLOOD PRESSURE: 108 MMHG

## 2025-07-02 DIAGNOSIS — O99.210 OBESITY IN PREGNANCY, ANTEPARTUM: ICD-10-CM

## 2025-07-02 DIAGNOSIS — Z34.80 SUPERVISION OF OTHER NORMAL PREGNANCY, ANTEPARTUM: Primary | ICD-10-CM

## 2025-07-02 DIAGNOSIS — O99.340 DEPRESSION AFFECTING PREGNANCY: ICD-10-CM

## 2025-07-02 DIAGNOSIS — F32.A DEPRESSION AFFECTING PREGNANCY: ICD-10-CM

## 2025-07-02 DIAGNOSIS — O34.219 PREVIOUS CESAREAN DELIVERY, ANTEPARTUM: ICD-10-CM

## 2025-07-02 LAB
DEPRECATED RDW RBC AUTO: 41.8 FL (ref 37–54)
ERYTHROCYTE [DISTWIDTH] IN BLOOD BY AUTOMATED COUNT: 13.2 % (ref 12.3–15.4)
GLUCOSE 1H P GLC SERPL-MCNC: 142 MG/DL (ref 65–139)
GLUCOSE UR STRIP-MCNC: NEGATIVE MG/DL
HCT VFR BLD AUTO: 35.1 % (ref 34–46.6)
HGB BLD-MCNC: 11.2 G/DL (ref 12–15.9)
MCH RBC QN AUTO: 27.4 PG (ref 26.6–33)
MCHC RBC AUTO-ENTMCNC: 31.9 G/DL (ref 31.5–35.7)
MCV RBC AUTO: 85.8 FL (ref 79–97)
PLATELET # BLD AUTO: 353 10*3/MM3 (ref 140–450)
PMV BLD AUTO: 10.3 FL (ref 6–12)
PROT UR STRIP-MCNC: NEGATIVE MG/DL
RBC # BLD AUTO: 4.09 10*6/MM3 (ref 3.77–5.28)
WBC NRBC COR # BLD AUTO: 9.59 10*3/MM3 (ref 3.4–10.8)

## 2025-07-02 PROCEDURE — 82950 GLUCOSE TEST: CPT | Performed by: OBSTETRICS & GYNECOLOGY

## 2025-07-02 PROCEDURE — 85027 COMPLETE CBC AUTOMATED: CPT | Performed by: OBSTETRICS & GYNECOLOGY

## 2025-07-02 NOTE — ASSESSMENT & PLAN NOTE
Follow-up anatomy ultrasound pending today  1 hour GTT today  Continue prenatal vitamins  Fetal kick counts   labor warnings

## 2025-07-02 NOTE — ASSESSMENT & PLAN NOTE
The patient continues to be highly interested in an attempt at vaginal birth after  delivery.  The risk, benefits, and risks of a trial of labor after  were discussed including the risks of uterine rupture.  We discussed that if uterine rupture occurred there is a significant likelihood of permanent fetal injury and/or death.  We discussed that likelihood is low with studies estimating somewhere between 1 and 301 and 100.  Will continue to  and discuss.  For now patient is interested in .

## 2025-07-02 NOTE — PROGRESS NOTES
[facility]  OB Follow Up Visit    CC: Routine obstetrical visit    Prenatal care complicated by:  Patient Active Problem List   Diagnosis    Obesity (BMI 30-39.9)    Class 3 severe obesity due to excess calories with serious comorbidity and body mass index (BMI) of 45.0 to 49.9 in adult    EMMA (generalized anxiety disorder)    Impaired fasting blood sugar    Mixed hyperlipidemia    Overactive bladder    Post traumatic stress disorder (PTSD)    Nightmares associated with chronic post-traumatic stress disorder    Insomnia, psychophysiological    Chronic bilateral low back pain with right-sided sciatica    Family history of colon cancer    Pelvic adhesions    Migraine with aura and without status migrainosus, not intractable    Recurrent major depressive disorder, in partial remission    Supervision of other normal pregnancy, antepartum    Previous  delivery, antepartum    Obesity in pregnancy, antepartum    Depression affecting pregnancy     Subjective:   Karine Swanson is a 35 y.o.  24w4d patient being seen today for her obstetrical follow up visit. The patient has: No complaints, No leaking fluid, No vaginal bleeding, No contractions, Adequate FM    History: Past medical and surgical history, medications, allergies, social history, and obstetrical history all reviewed and updated.    Objective:    Urine glucose/protein - See OB flow sheet      /70   Wt 127 kg (280 lb)   LMP 2025 (Exact Date)   BMI 49.60 kg/m²     General exam: Comfortable, NAD  FHR: 147 BPM   Uterine Size: 25 cm  Pelvic Exam: No    Assessment and Plan:  Diagnoses and all orders for this visit:    1. Supervision of other normal pregnancy, antepartum (Primary)  Overview:  EDC: 10/18/2025    Prenatal genetic screening: Declined    COVID-19 vaccine: Recommend  Flu vaccine: Recommend  DTaP vaccine: Recommend    Assessment & Plan:  Follow-up anatomy ultrasound pending today  1 hour GTT today  Continue prenatal  vitamins  Fetal kick counts   labor warnings    Orders:  -     POC Urinalysis Dipstick  -     CBC (No Diff); Future  -     Gestational Diabetes Screen 1 Hour; Future    2. Depression affecting pregnancy  Assessment & Plan:  Symptoms are stable.  Continue Prozac 80 mg daily.  Continue Minipress 1 mg before bed      3. Obesity in pregnancy, antepartum  Assessment & Plan:  Previously counseled      4. Previous  delivery, antepartum  Overview:  X 1: Twins  [facility] network  calculator: 55% chance of success  Placenta: Anterior    Assessment & Plan:  The patient continues to be highly interested in an attempt at vaginal birth after  delivery.  The risk, benefits, and risks of a trial of labor after  were discussed including the risks of uterine rupture.  We discussed that if uterine rupture occurred there is a significant likelihood of permanent fetal injury and/or death.  We discussed that likelihood is low with studies estimating somewhere between 1 and 301 and 100.  Will continue to  and discuss.  For now patient is interested in .        24w4d  Reassuring pregnancy progress    Counseling: OB precautions, leaking, VB, hang rashid vs PTL/Labor  [facility]    Questions answered    Return in about 4 weeks (around 2025) for Recheck.    Avi Kelly MD  2025

## 2025-07-10 ENCOUNTER — HOSPITAL ENCOUNTER (OUTPATIENT)
Facility: HOSPITAL | Age: 35
Discharge: HOME | End: 2025-07-10
Attending: OBSTETRICS & GYNECOLOGY | Admitting: OBSTETRICS & GYNECOLOGY
Payer: MEDICAID

## 2025-07-10 VITALS — SYSTOLIC BLOOD PRESSURE: 102 MMHG | DIASTOLIC BLOOD PRESSURE: 60 MMHG | HEART RATE: 85 BPM | TEMPERATURE: 98.4 F

## 2025-07-10 LAB
ALBUMIN SERPL-MCNC: 3.8 G/DL (ref 3.5–5.2)
ALBUMIN/GLOB SERPL: 1.1 G/DL
ALP SERPL-CCNC: 109 U/L (ref 39–117)
ALT SERPL W P-5'-P-CCNC: 14 U/L (ref 1–33)
ANION GAP SERPL CALCULATED.3IONS-SCNC: 13.1 MMOL/L (ref 5–15)
AST SERPL-CCNC: 14 U/L (ref 1–32)
BILIRUB SERPL-MCNC: 0.3 MG/DL (ref 0–1.2)
BUN SERPL-MCNC: 5 MG/DL (ref 6–20)
BUN/CREAT SERPL: 9.4 (ref 7–25)
CALCIUM SPEC-SCNC: 9.3 MG/DL (ref 8.6–10.5)
CHLORIDE SERPL-SCNC: 106 MMOL/L (ref 98–107)
CLARITY, POC: CLEAR
CO2 SERPL-SCNC: 17.9 MMOL/L (ref 22–29)
COLOR UR: YELLOW
CREAT SERPL-MCNC: 0.53 MG/DL (ref 0.57–1)
DEPRECATED RDW RBC AUTO: 45.3 FL (ref 37–54)
EGFRCR SERPLBLD CKD-EPI 2021: 124.6 ML/MIN/1.73
ERYTHROCYTE [DISTWIDTH] IN BLOOD BY AUTOMATED COUNT: 14.4 % (ref 12.3–15.4)
GLOBULIN UR ELPH-MCNC: 3.6 GM/DL
GLUCOSE SERPL-MCNC: 79 MG/DL (ref 65–99)
GLUCOSE UR STRIP-MCNC: NEGATIVE MG/DL
HCT VFR BLD AUTO: 37.8 % (ref 34–46.6)
HGB BLD-MCNC: 12.1 G/DL (ref 12–15.9)
KETONES UR QL: ABNORMAL
LEUKOCYTE EST, POC: NEGATIVE
MCH RBC QN AUTO: 27.7 PG (ref 26.6–33)
MCHC RBC AUTO-ENTMCNC: 32 G/DL (ref 31.5–35.7)
MCV RBC AUTO: 86.5 FL (ref 79–97)
NITRITE UR-MCNC: NEGATIVE MG/ML
PH UR: 7 [PH] (ref 5–8)
PLATELET # BLD AUTO: 377 10*3/MM3 (ref 140–450)
PMV BLD AUTO: 9.5 FL (ref 6–12)
POTASSIUM SERPL-SCNC: 3.9 MMOL/L (ref 3.5–5.2)
PROT SERPL-MCNC: 7.4 G/DL (ref 6–8.5)
PROT UR STRIP-MCNC: NEGATIVE MG/DL
RBC # BLD AUTO: 4.37 10*6/MM3 (ref 3.77–5.28)
RBC # UR STRIP: NEGATIVE /UL
SODIUM SERPL-SCNC: 137 MMOL/L (ref 136–145)
SP GR UR: 1.02 (ref 1–1.03)
WBC NRBC COR # BLD AUTO: 13.48 10*3/MM3 (ref 3.4–10.8)

## 2025-07-10 PROCEDURE — 25810000003 LACTATED RINGERS PER 1000 ML: Performed by: OBSTETRICS & GYNECOLOGY

## 2025-07-10 PROCEDURE — 25010000002 ONDANSETRON PER 1 MG: Performed by: OBSTETRICS & GYNECOLOGY

## 2025-07-10 PROCEDURE — 80053 COMPREHEN METABOLIC PANEL: CPT | Performed by: OBSTETRICS & GYNECOLOGY

## 2025-07-10 PROCEDURE — 81002 URINALYSIS NONAUTO W/O SCOPE: CPT | Performed by: OBSTETRICS & GYNECOLOGY

## 2025-07-10 PROCEDURE — 85027 COMPLETE CBC AUTOMATED: CPT | Performed by: OBSTETRICS & GYNECOLOGY

## 2025-07-10 PROCEDURE — G0463 HOSPITAL OUTPT CLINIC VISIT: HCPCS

## 2025-07-10 PROCEDURE — 59025 FETAL NON-STRESS TEST: CPT

## 2025-07-10 PROCEDURE — 96374 THER/PROPH/DIAG INJ IV PUSH: CPT

## 2025-07-10 PROCEDURE — 96361 HYDRATE IV INFUSION ADD-ON: CPT

## 2025-07-10 RX ORDER — ONDANSETRON 2 MG/ML
4 INJECTION INTRAMUSCULAR; INTRAVENOUS EVERY 6 HOURS PRN
Status: DISCONTINUED | OUTPATIENT
Start: 2025-07-10 | End: 2025-07-10 | Stop reason: HOSPADM

## 2025-07-10 RX ORDER — SODIUM CHLORIDE 0.9 % (FLUSH) 0.9 %
10 SYRINGE (ML) INJECTION EVERY 12 HOURS SCHEDULED
Status: DISCONTINUED | OUTPATIENT
Start: 2025-07-10 | End: 2025-07-10 | Stop reason: HOSPADM

## 2025-07-10 RX ORDER — SODIUM CHLORIDE 9 MG/ML
40 INJECTION, SOLUTION INTRAVENOUS AS NEEDED
Status: DISCONTINUED | OUTPATIENT
Start: 2025-07-10 | End: 2025-07-10 | Stop reason: HOSPADM

## 2025-07-10 RX ORDER — SODIUM CHLORIDE 0.9 % (FLUSH) 0.9 %
10 SYRINGE (ML) INJECTION AS NEEDED
Status: DISCONTINUED | OUTPATIENT
Start: 2025-07-10 | End: 2025-07-10 | Stop reason: HOSPADM

## 2025-07-10 RX ORDER — SODIUM CHLORIDE, SODIUM LACTATE, POTASSIUM CHLORIDE, CALCIUM CHLORIDE 600; 310; 30; 20 MG/100ML; MG/100ML; MG/100ML; MG/100ML
999 INJECTION, SOLUTION INTRAVENOUS CONTINUOUS
Status: ACTIVE | OUTPATIENT
Start: 2025-07-10 | End: 2025-07-10

## 2025-07-10 RX ADMIN — ONDANSETRON 4 MG: 2 INJECTION INTRAMUSCULAR; INTRAVENOUS at 19:40

## 2025-07-10 RX ADMIN — SODIUM CHLORIDE, POTASSIUM CHLORIDE, SODIUM LACTATE AND CALCIUM CHLORIDE 999 ML/HR: 600; 310; 30; 20 INJECTION, SOLUTION INTRAVENOUS at 19:35

## 2025-07-10 NOTE — NURSING NOTE
Pt to OB triage c/o vomiting and diarrhea yesterday.  Better today but began having lower abd cramping this am.  Reports was able to eat some tomatoes and bread and drink a little water today.  Able to keep this down but had nausea after eating.

## 2025-07-11 NOTE — OBED NOTES
Considering not doing BTL---counseled--will give decision next visit; Chevy C/S 2/26/2018   MALI Morrison  Obstetric History and Physical    Chief Complaint   Patient presents with    Vomiting     C/O vomiting diarrhea since yesterday.  Co lower abd cramping since this am.  Not tolerating po food or fluids       Subjective     Patient is a 35 y.o. female  currently at 25w6d, who presents with complaint of nausea, vomiting and diarrhea since yesterday.  She reports some abdominal pain.  Denies any vaginal bleeding.  Positive fetal movements.    PNC provided by: [facility]. Her prenatal care is noted for no complication.  Her previous obstetric/gynecological history is noted for is non-contributory.    The following portions of the patients history were reviewed and updated as appropriate: current medications, allergies, past medical history, past surgical history, past family history, past social history, and problem list .       Prenatal Information:  Prenatal Results       Initial Prenatal Labs       Test Value Reference Range Date Time    Hemoglobin  12.3 g/dL 12.0 - 15.9 25 1103    Hematocrit  39.8 % 34.0 - 46.6 25 1103    Platelets  414 10*3/mm3 140 - 450 25 1103    Rubella IgG  <0.90 index Immune >0.99 25 1432    Hepatitis B SAg  Non-Reactive  Non-Reactive 25 1103    Hepatitis C Ab  Non-Reactive  Non-Reactive 25 1432    RPR  Non-Reactive  Non-Reactive 25 1432    T. Pallidum Ab         ABO  A   25 1103    Rh  Positive   25 1103    Antibody Screen  Negative   25 1103    HIV  Non-Reactive  Non-Reactive 25 1432    Urine Culture  <25,000 CFU/mL Normal Urogenital Liliane   25 1346       No growth   25 1441    Gonorrhea  Negative  Negative 25 1346    Chlamydia  Negative  Negative 25 1346    TSH  1.690 uIU/mL 0.270 - 4.200 25 1441    HgB A1c   5.70 % 4.80 - 5.60 25 1441    Varicella IgG        Hemoglobinopathy Fractionation  Comment   22 0955    Hemoglobinopathy (genetic testing)        Cystic fibrosis          Spinal muscular atrophy        Fragile X                  Fetal testing        Test Value Reference Range Date Time    NIPT        MSAFP        AFP-4                  2nd and 3rd Trimester       Test Value Reference Range Date Time    Hemoglobin (repeated)  12.1 g/dL 12.0 - 15.9 07/10/25 1935       11.2 g/dL 12.0 - 15.9 07/02/25 1113    Hematocrit (repeated)  37.8 % 34.0 - 46.6 07/10/25 1935       35.1 % 34.0 - 46.6 07/02/25 1113    Platelets   377 10*3/mm3 140 - 450 07/10/25 1935       353 10*3/mm3 140 - 450 07/02/25 1113       414 10*3/mm3 140 - 450 05/06/25 1103    1 hour GTT   142 mg/dL 65 - 139 07/02/25 1113    Antibody Screen (repeated)        3rd TM syphilis scrn (repeated)  RPR         3rd TM syphilis scrn (repeated) TP-Ab        3rd TM syphilis screen TB-Ab (FTA)        Syphilis cascade test TP-Ab (EIA)        Syphilis cascade TPPA        GTT Fasting        GTT 1 Hr        GTT 2 Hr        GTT 3 Hr        Group B Strep                  Other testing        Test Value Reference Range Date Time    Parvo IgG         CMV IgG                   Drug Screening       Test Value Reference Range Date Time    Amphetamine Screen  Negative  Negative 04/09/25 1346    Barbiturate Screen  Negative  Negative 04/09/25 1346    Benzodiazepine Screen  Negative  Negative 04/09/25 1346    Methadone Screen  Negative  Negative 04/09/25 1346    Phencyclidine Screen  Negative  Negative 04/09/25 1346    Opiates Screen  Negative  Negative 04/09/25 1346    THC Screen  Negative  Negative 04/09/25 1346    Cocaine Screen  Negative  Negative 04/09/25 1346    Propoxyphene Screen        Buprenorphine Screen  Negative  Negative 04/09/25 1346    Methamphetamine Screen  Negative  Negative 04/09/25 1346    Oxycodone Screen  Negative  Negative 04/09/25 1346    Tricyclic Antidepressants Screen  Negative  Negative 04/09/25 1346              Legend    ^: Historical                          External Prenatal Results       Pregnancy Outside Results -  Transcribed From Office Records - See Scanned Records For Details       Test Value Date Time    ABO  A  05/06/25 1103    Rh  Positive  05/06/25 1103    Antibody Screen  Negative  05/06/25 1103    Varicella IgG       Rubella  <0.90 index 04/09/25 1432    Hgb  12.1 g/dL 07/10/25 1935       11.2 g/dL 07/02/25 1113       12.3 g/dL 05/06/25 1103    Hct  37.8 % 07/10/25 1935       35.1 % 07/02/25 1113       39.8 % 05/06/25 1103    HgB A1c   5.70 % 02/04/25 1441    1h GTT  142 mg/dL 07/02/25 1113    3h GTT Fasting       3h GTT 1 hour       3h GTT 2 hour       3h GTT 3 hour        Gonorrhea (discrete)  Negative  04/09/25 1346    Chlamydia (discrete)  Negative  04/09/25 1346    RPR  Non-Reactive  04/09/25 1432    Syphils cascade: TP-Ab (FTA)       TP-Ab       TP-Ab (EIA)       TPPA       HBsAg  Non-Reactive  05/06/25 1103    Herpes Simplex Virus PCR       Herpes Simplex VIrus Culture       HIV  Non-Reactive  04/09/25 1432    Hep C RNA Quant PCR       Hep C Antibody  Non-Reactive  04/09/25 1432    AFP       NIPT       Cystic Fibrosis (Mirella)       Cystic Fibroisis        Spinal Muscular atrophy       Fragile X       Group B Strep       GBS Susceptibility to Clindamycin       GBS Susceptibility to Erythromycin       Fetal Fibronectin       Genetic Testing, Maternal Blood                 Drug Screening       Test Value Date Time    Urine Drug Screen       Amphetamine Screen  Negative  04/09/25 1346    Barbiturate Screen  Negative  04/09/25 1346    Benzodiazepine Screen  Negative  04/09/25 1346    Methadone Screen  Negative  04/09/25 1346    Phencyclidine Screen  Negative  04/09/25 1346    Opiates Screen  Negative  04/09/25 1346    THC Screen  Negative  04/09/25 1346    Cocaine Screen       Propoxyphene Screen       Buprenorphine Screen  Negative  04/09/25 1346    Methamphetamine Screen       Oxycodone Screen  Negative  04/09/25 1346    Tricyclic Antidepressants Screen  Negative  04/09/25 1346              Legend    ^:  Historical                             Past OB History:     OB History    Para Term  AB Living   5 4 3 1 0 5   SAB IAB Ectopic Molar Multiple Live Births   0 0 0 0 1 5      # Outcome Date GA Lbr Ron/2nd Weight Sex Type Anes PTL Lv   5 Current            4A  22 34w4d  2335 g (5 lb 2.4 oz) M CS-LTranv Spinal Y EVANGELINA      Name: WARD,GARDENIA DUONG      Apgar1: 6  Apgar5: 7   4B  22 34w4d  2660 g (5 lb 13.8 oz) M CS-LTranv Spinal Y EVANGELINA      Birth Comments: limp, blue, no resp effort, dry and stim with no response, started PPV at 40%, no response, increased to 100%      Name: WARDGARDENIA      Apgar1: 2  Apgar5: 5   3 Term 20 39w0d   F Vaginal unsp   EVANGELINA   2 Term 10/30/18 40w0d  3912 g (8 lb 10 oz) F Vag-Spont   EVANGELINA   1 Term 17 40w0d   M Vaginal unsp   EVANGELINA       Past Medical History: Past Medical History:   Diagnosis Date    Anxiety     Depression     Migraine     Ovarian cyst     Pelvic pain     Pre-diabetes     S/P laparoscopy 2024      Past Surgical History Past Surgical History:   Procedure Laterality Date     SECTION N/A 3/25/2022    Procedure:  SECTION PRIMARY;  Surgeon: Avi Kelly MD;  Location: [facility] LABOR DELIVERY;  Service: Gynecology;  Laterality: N/A;    DIAGNOSTIC LAPAROSCOPY N/A 2024    Procedure: DIAGNOSTIC LAPAROSCOPY, LYSIS OF ADHESIONS;  Surgeon: Avi Kelly MD;  Location: [facility] MAIN OR;  Service: Gynecology;  Laterality: N/A;    LAPAROSCOPIC CHOLECYSTECTOMY      TONSILLECTOMY        Family History: Family History   Problem Relation Age of Onset    No Known Problems Mother     No Known Problems Father     No Known Problems Sister     No Known Problems Brother     No Known Problems Maternal Aunt     No Known Problems Maternal Uncle     No Known Problems Paternal Aunt     No Known Problems Paternal Uncle     No Known Problems Maternal Grandmother     No Known Problems Maternal Grandfather     No Known  Problems Paternal Grandmother     No Known Problems Paternal Grandfather     No Known Problems Cousin     No Known Problems Other     ADD / ADHD Neg Hx     Alcohol abuse Neg Hx     Anxiety disorder Neg Hx     Bipolar disorder Neg Hx     Dementia Neg Hx     Depression Neg Hx     Drug abuse Neg Hx     OCD Neg Hx     Paranoid behavior Neg Hx     Schizophrenia Neg Hx     Seizures Neg Hx     Self-Injurious Behavior  Neg Hx     Suicide Attempts Neg Hx     Breast cancer Neg Hx     Ovarian cancer Neg Hx     Uterine cancer Neg Hx     Colon cancer Neg Hx     Malig Hyperthermia Neg Hx       Social History:  reports that she has never smoked. She has never been exposed to tobacco smoke. She has never used smokeless tobacco.   reports no history of alcohol use.   reports no history of drug use.        General ROS: Pertinent items are noted in HPI    Objective       Vital Signs Range for the last 24 hours  Temperature: Temp:  [98.4 °F (36.9 °C)] 98.4 °F (36.9 °C)   Temp Source:     BP: BP: (102-108)/(60-63) 102/60   Pulse: Heart Rate:  [85-90] 85   Respirations:     SPO2:     O2 Amount (l/min):     O2 Devices     Weight:       Physical Examination: General appearance - alert, well appearing, and in no distress  Mental status - alert, oriented to person, place, and time  Chest - clear to auscultation, no wheezes, rales or rhonchi, symmetric air entry  Heart - normal rate, regular rhythm, normal S1, S2, no murmurs, rubs, clicks or gallops  Abdomen - soft, nontender, gravid  Extremities - peripheral pulses normal, no pedal edema, no clubbing or cyanosis  Skin - normal coloration and turgor, no rashes, no suspicious skin lesions noted    Presentation: Unknown   Cervix: Exam by:     Dilation:     Effacement:     Station:         Fetal Heart Rate Assessment   Method: Fetal HR Assessment Method: external   Beats/min: Fetal HR (beats/min): 145   Baseline: Fetal HR Baseline: normal range   Variability: Fetal HR Variability: moderate  (amplitude range 6 to 25 bpm)   Accels: Fetal HR Accelerations: other (see comments) (appropriate for gestational age)   Decels: Fetal HR Decelerations: absent         Uterine Assessment   Method: Method: external tocotransducer, palpation   Frequency (min):     Ctx Count in 10 min:     Duration:     Intensity: Contraction Intensity: no contractions       [place] Units:       GBS is unknown      Assessment & Plan     Gastroenteritis      Assessment:  1.  Intrauterine pregnancy at 25w6d gestation with reassuring fetal status.    2.  Likely viral gastroenteritis and dehydration-patient received IV fluids and Zofran and reported feeling better    Plan:  Discharge home  Increase p.o. fluids and food   labor precautions      Anshul Antony MD  2025  05:41 EDT

## 2025-07-11 NOTE — NON STRESS TEST
Obstetrical Non-stress Test Interpretation     Name:  Karine Swanson  MRN: [id number]    35 y.o. female  at 25w5d    Indication: abdominal cramping      Fetal Assessment  Fetal Movement: active  Fetal HR Assessment Method: external  Fetal HR (beats/min): 145  Fetal HR Baseline: normal range  Fetal HR Variability: moderate (amplitude range 6 to 25 bpm)  Fetal HR Accelerations: other (see comments) (appropriate for gestational age)  Fetal HR Decelerations: absent  Sinusoidal Pattern Present: absent    /60   Pulse 85   Temp 98.4 °F (36.9 °C)   LMP 2025 (Exact Date)     Reason for test: OB Triage (abdominal cramping)  Date of Test: 7/10/2025  Time frame of test: 4116-4929  RN NST Interpretation: Reactive      Jody Farris RN  7/10/2025  21:23 EDT

## 2025-07-16 ENCOUNTER — LAB (OUTPATIENT)
Facility: HOSPITAL | Age: 35
End: 2025-07-16
Payer: MEDICAID

## 2025-07-16 DIAGNOSIS — Z34.80 SUPERVISION OF OTHER NORMAL PREGNANCY, ANTEPARTUM: ICD-10-CM

## 2025-07-16 LAB
GLUCOSE BLDC GLUCOMTR-MCNC: 85 MG/DL (ref 70–99)
GLUCOSE P FAST SERPL-MCNC: 97 MG/DL (ref 65–94)
GTT GEST 2H PNL UR+SERPL: 200 MG/DL (ref 65–179)
GTT GEST 3H PNL SERPL: 122 MG/DL (ref 65–139)
GTT GEST 3H PNL SERPL: 139 MG/DL (ref 65–154)

## 2025-07-16 PROCEDURE — 82952 GTT-ADDED SAMPLES: CPT

## 2025-07-16 PROCEDURE — 36415 COLL VENOUS BLD VENIPUNCTURE: CPT

## 2025-07-16 PROCEDURE — 82951 GLUCOSE TOLERANCE TEST (GTT): CPT

## 2025-07-17 ENCOUNTER — RESULTS FOLLOW-UP (OUTPATIENT)
Facility: HOSPITAL | Age: 35
End: 2025-07-17
Payer: MEDICAID

## 2025-07-17 DIAGNOSIS — O24.410 GDM (GESTATIONAL DIABETES MELLITUS), CLASS A1: Primary | ICD-10-CM

## 2025-07-17 RX ORDER — BLOOD-GLUCOSE METER
1 KIT MISCELLANEOUS 4 TIMES DAILY
Qty: 1 EACH | Refills: 0 | Status: SHIPPED | OUTPATIENT
Start: 2025-07-17

## 2025-07-17 RX ORDER — LANCETS 28 GAUGE
1 EACH MISCELLANEOUS 4 TIMES DAILY
Qty: 100 EACH | Refills: 5 | Status: SHIPPED | OUTPATIENT
Start: 2025-07-17

## 2025-07-17 NOTE — PROGRESS NOTES
Please notify the patient that unfortunately, she failed her 3-hour GTT and now has a diagnosis of gestational diabetes.  She will need to begin checking her blood sugar 4 times daily including before she has a meal for [place] in the morning, and 2 hours after each meal.  I have sent prescriptions for a glucometer, test strips and lancets to her pharmacy.  She will need to record her blood sugar values and bring both her record and her meter to each office visit.  I have also placed a referral for diabetic education which I strongly urged the patient to keep.  The diabetic educators can be very helpful in managing the patient's gestational diabetes effectively.  The patient will need a follow-up in the next 10 days to review her blood sugar log

## 2025-07-30 ENCOUNTER — EDUCATION (OUTPATIENT)
Dept: DIABETES SERVICES | Facility: HOSPITAL | Age: 35
End: 2025-07-30
Payer: MEDICAID

## 2025-07-30 ENCOUNTER — ROUTINE PRENATAL (OUTPATIENT)
Dept: OBSTETRICS AND GYNECOLOGY | Age: 35
End: 2025-07-30
Payer: MEDICAID

## 2025-07-30 VITALS — WEIGHT: 277 LBS | BODY MASS INDEX: 49.07 KG/M2 | SYSTOLIC BLOOD PRESSURE: 109 MMHG | DIASTOLIC BLOOD PRESSURE: 77 MMHG

## 2025-07-30 DIAGNOSIS — O99.340 DEPRESSION AFFECTING PREGNANCY: ICD-10-CM

## 2025-07-30 DIAGNOSIS — O26.899 ABDOMINAL PAIN AFFECTING PREGNANCY: ICD-10-CM

## 2025-07-30 DIAGNOSIS — O34.219 PREVIOUS CESAREAN DELIVERY, ANTEPARTUM: ICD-10-CM

## 2025-07-30 DIAGNOSIS — R10.9 ABDOMINAL PAIN AFFECTING PREGNANCY: ICD-10-CM

## 2025-07-30 DIAGNOSIS — F32.A DEPRESSION AFFECTING PREGNANCY: ICD-10-CM

## 2025-07-30 DIAGNOSIS — Z34.80 SUPERVISION OF OTHER NORMAL PREGNANCY, ANTEPARTUM: Primary | ICD-10-CM

## 2025-07-30 DIAGNOSIS — O24.410 GDM (GESTATIONAL DIABETES MELLITUS), CLASS A1: ICD-10-CM

## 2025-07-30 DIAGNOSIS — O99.210 OBESITY IN PREGNANCY, ANTEPARTUM: ICD-10-CM

## 2025-07-30 DIAGNOSIS — O24.410 GDM (GESTATIONAL DIABETES MELLITUS), CLASS A1: Primary | ICD-10-CM

## 2025-07-30 LAB
ALBUMIN SERPL-MCNC: 3.5 G/DL (ref 3.5–5.2)
ALBUMIN/GLOB SERPL: 1.1 G/DL
ALP SERPL-CCNC: 115 U/L (ref 39–117)
ALT SERPL W P-5'-P-CCNC: 17 U/L (ref 1–33)
ANION GAP SERPL CALCULATED.3IONS-SCNC: 11.3 MMOL/L (ref 5–15)
AST SERPL-CCNC: 18 U/L (ref 1–32)
BILIRUB SERPL-MCNC: 0.3 MG/DL (ref 0–1.2)
BUN SERPL-MCNC: 4 MG/DL (ref 6–20)
BUN/CREAT SERPL: 6.6 (ref 7–25)
CALCIUM SPEC-SCNC: 9 MG/DL (ref 8.6–10.5)
CHLORIDE SERPL-SCNC: 107 MMOL/L (ref 98–107)
CO2 SERPL-SCNC: 16.7 MMOL/L (ref 22–29)
CREAT SERPL-MCNC: 0.61 MG/DL (ref 0.57–1)
DEPRECATED RDW RBC AUTO: 43.3 FL (ref 37–54)
EGFRCR SERPLBLD CKD-EPI 2021: 120.5 ML/MIN/1.73
ERYTHROCYTE [DISTWIDTH] IN BLOOD BY AUTOMATED COUNT: 13.9 % (ref 12.3–15.4)
GLOBULIN UR ELPH-MCNC: 3.3 GM/DL
GLUCOSE SERPL-MCNC: 104 MG/DL (ref 65–99)
GLUCOSE UR STRIP-MCNC: NEGATIVE MG/DL
HCT VFR BLD AUTO: 38.1 % (ref 34–46.6)
HGB BLD-MCNC: 12 G/DL (ref 12–15.9)
MCH RBC QN AUTO: 27.1 PG (ref 26.6–33)
MCHC RBC AUTO-ENTMCNC: 31.5 G/DL (ref 31.5–35.7)
MCV RBC AUTO: 86 FL (ref 79–97)
PLATELET # BLD AUTO: 367 10*3/MM3 (ref 140–450)
PMV BLD AUTO: 10.5 FL (ref 6–12)
POTASSIUM SERPL-SCNC: 4.1 MMOL/L (ref 3.5–5.2)
PROT SERPL-MCNC: 6.8 G/DL (ref 6–8.5)
PROT UR STRIP-MCNC: NEGATIVE MG/DL
RBC # BLD AUTO: 4.43 10*6/MM3 (ref 3.77–5.28)
SODIUM SERPL-SCNC: 135 MMOL/L (ref 136–145)
WBC NRBC COR # BLD AUTO: 11.63 10*3/MM3 (ref 3.4–10.8)

## 2025-07-30 PROCEDURE — 80053 COMPREHEN METABOLIC PANEL: CPT | Performed by: OBSTETRICS & GYNECOLOGY

## 2025-07-30 PROCEDURE — 85027 COMPLETE CBC AUTOMATED: CPT | Performed by: OBSTETRICS & GYNECOLOGY

## 2025-07-30 PROCEDURE — G0108 DIAB MANAGE TRN  PER INDIV: HCPCS

## 2025-07-30 RX ORDER — BLOOD-GLUCOSE METER
KIT MISCELLANEOUS
COMMUNITY
Start: 2025-07-17

## 2025-07-30 NOTE — PROGRESS NOTES
[facility]  OB Follow Up Visit    CC: Routine obstetrical visit    Prenatal care complicated by:  Patient Active Problem List   Diagnosis    Obesity (BMI 30-39.9)    Class 3 severe obesity due to excess calories with serious comorbidity and body mass index (BMI) of 45.0 to 49.9 in adult    EMMA (generalized anxiety disorder)    Impaired fasting blood sugar    Mixed hyperlipidemia    Overactive bladder    Post traumatic stress disorder (PTSD)    Nightmares associated with chronic post-traumatic stress disorder    Insomnia, psychophysiological    Chronic bilateral low back pain with right-sided sciatica    Family history of colon cancer    Pelvic adhesions    Migraine with aura and without status migrainosus, not intractable    Recurrent major depressive disorder, in partial remission    Supervision of other normal pregnancy, antepartum    Previous  delivery, antepartum    Obesity in pregnancy, antepartum    Depression affecting pregnancy    GDM (gestational diabetes mellitus), class A1     Subjective:   Karine Swanson is a 35 y.o.  28w4d patient being seen today for her obstetrical follow up visit. The patient has: No complaints, No leaking fluid, No vaginal bleeding, No contractions, Adequate FM  The patient does report pretty significant fatigue    History: Past medical and surgical history, medications, allergies, social history, and obstetrical history all reviewed and updated.    Objective:    Urine glucose/protein - See OB flow sheet      /77   Wt 126 kg (277 lb)   LMP 2025 (Exact Date)   BMI 49.07 kg/m²     General exam: Comfortable, NAD  FHR: 155 BPM   Uterine Size: 30 cm  Pelvic Exam: No    Assessment and Plan:  Diagnoses and all orders for this visit:    1. Supervision of other normal pregnancy, antepartum (Primary)  Overview:  EDC: 10/18/2025    Prenatal genetic screening: Declined    COVID-19 vaccine: Recommend  Flu vaccine: Recommend  DTaP vaccine: Declined  2025    Assessment & Plan:  Continue prenatal vitamins  Fetal kick counts   labor warnings  Declined Tdap    Orders:  -     POC Urinalysis Dipstick    2. Abdominal pain affecting pregnancy  -     CBC (No Diff)  -     Comprehensive Metabolic Panel    3. Previous  delivery, antepartum  Overview:  X 1: Twins  [facility] network  calculator: 55% chance of success  Placenta: Anterior      4. Obesity in pregnancy, antepartum    5. GDM (gestational diabetes mellitus), class A1  Assessment & Plan:  The patient blood sugar log was reviewed.  She does have some intermittent elevated fastings as well as intermittent elevated tale postprandials.  When reviewing the patient's diet she is not following her diet recommendations very well.  We discussed that she has to cut out simple carbohydrates such as refined and processed sweet foods and sweetened drinks.  We also discussed that she will have to significantly decrease consumption of high carbohydrate foods such as potatoes Posta and bread.  We discussed she should consume more lean protein as well as green vegetables particularly.  We also discussed that she should be careful of fruit as it has a lot of natural sugars that can cause blood sugar elevations.  Overall at this point I do not think the patient needs to start medications but we will monitor closely and start those medications if felt to be necessary.    Orders:  -     US Ob 14 + Weeks Single or First Gestation; Future    6. Depression affecting pregnancy  Assessment & Plan:  The patient reports overall her symptoms are stable.  Continue Prozac 80 mg daily.  She feels like that if the temperature were lower she can get outside more and she thinks that would help her symptoms.  We discussed that she certainly can try to do outside activities more early in the morning and later at night when there is less heat.        28w4d  Reassuring pregnancy progress    Counseling: OB precautions, leaking, VB,  hang rashid vs PTL/Labor  [facility]    Questions answered    Return in about 2 weeks (around 8/13/2025) for Recheck.    Avi Kelly MD  07/30/2025

## 2025-07-31 PROBLEM — O24.410 GDM (GESTATIONAL DIABETES MELLITUS), CLASS A1: Status: ACTIVE | Noted: 2025-07-31

## 2025-07-31 NOTE — ASSESSMENT & PLAN NOTE
The patient reports overall her symptoms are stable.  Continue Prozac 80 mg daily.  She feels like that if the temperature were lower she can get outside more and she thinks that would help her symptoms.  We discussed that she certainly can try to do outside activities more early in the morning and later at night when there is less heat.

## 2025-07-31 NOTE — ASSESSMENT & PLAN NOTE
The patient blood sugar log was reviewed.  She does have some intermittent elevated fastings as well as intermittent elevated tale postprandials.  When reviewing the patient's diet she is not following her diet recommendations very well.  We discussed that she has to cut out simple carbohydrates such as refined and processed sweet foods and sweetened drinks.  We also discussed that she will have to significantly decrease consumption of high carbohydrate foods such as potatoes Posta and bread.  We discussed she should consume more lean protein as well as green vegetables particularly.  We also discussed that she should be careful of fruit as it has a lot of natural sugars that can cause blood sugar elevations.  Overall at this point I do not think the patient needs to start medications but we will monitor closely and start those medications if felt to be necessary.

## 2025-08-05 ENCOUNTER — OFFICE VISIT (OUTPATIENT)
Dept: FAMILY MEDICINE CLINIC | Facility: CLINIC | Age: 35
End: 2025-08-05
Payer: MEDICAID

## 2025-08-05 VITALS
BODY MASS INDEX: 49.5 KG/M2 | OXYGEN SATURATION: 98 % | WEIGHT: 279.4 LBS | HEART RATE: 98 BPM | DIASTOLIC BLOOD PRESSURE: 73 MMHG | HEIGHT: 63 IN | SYSTOLIC BLOOD PRESSURE: 109 MMHG | TEMPERATURE: 97.9 F

## 2025-08-05 DIAGNOSIS — F32.A DEPRESSION AFFECTING PREGNANCY: ICD-10-CM

## 2025-08-05 DIAGNOSIS — E78.2 MIXED HYPERLIPIDEMIA: ICD-10-CM

## 2025-08-05 DIAGNOSIS — O24.419 GESTATIONAL DIABETES MELLITUS (GDM) IN SECOND TRIMESTER, GESTATIONAL DIABETES METHOD OF CONTROL UNSPECIFIED: ICD-10-CM

## 2025-08-05 DIAGNOSIS — Z00.00 ANNUAL PHYSICAL EXAM: Primary | ICD-10-CM

## 2025-08-05 DIAGNOSIS — R73.01 IMPAIRED FASTING BLOOD SUGAR: ICD-10-CM

## 2025-08-05 DIAGNOSIS — O99.340 DEPRESSION AFFECTING PREGNANCY: ICD-10-CM

## 2025-08-05 DIAGNOSIS — F41.1 GAD (GENERALIZED ANXIETY DISORDER): ICD-10-CM

## 2025-08-05 PROCEDURE — 1125F AMNT PAIN NOTED PAIN PRSNT: CPT | Performed by: NURSE PRACTITIONER

## 2025-08-05 PROCEDURE — 2014F MENTAL STATUS ASSESS: CPT | Performed by: NURSE PRACTITIONER

## 2025-08-05 PROCEDURE — 99395 PREV VISIT EST AGE 18-39: CPT | Performed by: NURSE PRACTITIONER

## 2025-08-13 ENCOUNTER — ROUTINE PRENATAL (OUTPATIENT)
Dept: OBSTETRICS AND GYNECOLOGY | Age: 35
End: 2025-08-13
Payer: MEDICAID

## 2025-08-13 VITALS — WEIGHT: 284 LBS | BODY MASS INDEX: 50.31 KG/M2 | SYSTOLIC BLOOD PRESSURE: 110 MMHG | DIASTOLIC BLOOD PRESSURE: 75 MMHG

## 2025-08-13 DIAGNOSIS — F32.A DEPRESSION AFFECTING PREGNANCY: ICD-10-CM

## 2025-08-13 DIAGNOSIS — O24.410 GDM (GESTATIONAL DIABETES MELLITUS), CLASS A1: ICD-10-CM

## 2025-08-13 DIAGNOSIS — O99.340 DEPRESSION AFFECTING PREGNANCY: ICD-10-CM

## 2025-08-13 DIAGNOSIS — O34.219 PREVIOUS CESAREAN DELIVERY, ANTEPARTUM: ICD-10-CM

## 2025-08-13 DIAGNOSIS — Z34.80 SUPERVISION OF OTHER NORMAL PREGNANCY, ANTEPARTUM: Primary | ICD-10-CM

## 2025-08-13 DIAGNOSIS — O99.210 OBESITY IN PREGNANCY, ANTEPARTUM: ICD-10-CM

## 2025-08-13 PROBLEM — R73.01 IMPAIRED FASTING BLOOD SUGAR: Status: RESOLVED | Noted: 2024-02-02 | Resolved: 2025-08-13

## 2025-08-13 PROBLEM — Z00.00 ANNUAL PHYSICAL EXAM: Status: RESOLVED | Noted: 2024-04-22 | Resolved: 2025-08-13

## 2025-08-13 LAB
GLUCOSE UR STRIP-MCNC: NEGATIVE MG/DL
PROT UR STRIP-MCNC: NEGATIVE MG/DL

## 2025-08-26 ENCOUNTER — ROUTINE PRENATAL (OUTPATIENT)
Dept: OBSTETRICS AND GYNECOLOGY | Age: 35
End: 2025-08-26
Payer: MEDICAID

## 2025-08-26 VITALS — BODY MASS INDEX: 49.78 KG/M2 | WEIGHT: 281 LBS | DIASTOLIC BLOOD PRESSURE: 78 MMHG | SYSTOLIC BLOOD PRESSURE: 114 MMHG

## 2025-08-26 DIAGNOSIS — O24.419 GDM, CLASS A2: ICD-10-CM

## 2025-08-26 DIAGNOSIS — F32.A DEPRESSION AFFECTING PREGNANCY: ICD-10-CM

## 2025-08-26 DIAGNOSIS — O99.340 DEPRESSION AFFECTING PREGNANCY: ICD-10-CM

## 2025-08-26 DIAGNOSIS — Z34.80 SUPERVISION OF OTHER NORMAL PREGNANCY, ANTEPARTUM: Primary | ICD-10-CM

## 2025-08-26 DIAGNOSIS — O99.210 OBESITY IN PREGNANCY, ANTEPARTUM: ICD-10-CM

## 2025-08-26 DIAGNOSIS — O34.219 PREVIOUS CESAREAN DELIVERY, ANTEPARTUM: ICD-10-CM

## 2025-08-26 LAB
GLUCOSE UR STRIP-MCNC: NEGATIVE MG/DL
PROT UR STRIP-MCNC: NEGATIVE MG/DL

## (undated) DEVICE — NEEDLE,18GX1.5",REG,BEVEL: Brand: MEDLINE

## (undated) DEVICE — LITHOTOMY-YELLOW FINS: Brand: MEDLINE INDUSTRIES, INC.

## (undated) DEVICE — MARYLAND JAW LAPAROSCOPIC SEALER/DIVIDER COATED: Brand: LIGASURE

## (undated) DEVICE — VAGINAL PREP TRAY: Brand: MEDLINE INDUSTRIES, INC.

## (undated) DEVICE — ADHS LIQ MASTISOL 2/3ML

## (undated) DEVICE — SUT MNCRYL 0/0 CTX 36IN Y398H

## (undated) DEVICE — SLV SCD KN/LEN ADJ EXPRSS BLENDED MD 1P/U

## (undated) DEVICE — CATH URETH INTRMIT ALLPURP LTX 16F RED

## (undated) DEVICE — GLV SURG BIOGEL LTX PF 7

## (undated) DEVICE — PAD GRND REM POLYHESIVE A/ DISP

## (undated) DEVICE — SUT CHRM 0 CT1 36IN 924H

## (undated) DEVICE — SUT VIC 3/0 CTI 36IN J944H

## (undated) DEVICE — STRIP,CLOSURE,WOUND,MEDI-STRIP,1/2X4: Brand: MEDLINE

## (undated) DEVICE — CVR HNDL LT SURG ACCSSRY BLU STRL

## (undated) DEVICE — INTENDED FOR TISSUE SEPARATION, AND OTHER PROCEDURES THAT REQUIRE A SHARP SURGICAL BLADE TO PUNCTURE OR CUT.: Brand: BARD-PARKER ® CARBON RIB-BACK BLADES

## (undated) DEVICE — TROCAR: Brand: KII® SLEEVE

## (undated) DEVICE — COVADERM PLUS: Brand: DEROYAL

## (undated) DEVICE — SUT MNCRYL PLS ANTIB UD 4/0 PS2 18IN

## (undated) DEVICE — APPL CHLORAPREP HI/LITE 26ML ORNG

## (undated) DEVICE — PCH SURG INST LAP 2 LF

## (undated) DEVICE — LAPAROSCOPIC SCISSORS: Brand: EPIX LAPAROSCOPIC SCISSORS

## (undated) DEVICE — TOWEL,OR,DSP,ST,BLUE,STD,4/PK,20PK/CS: Brand: MEDLINE

## (undated) DEVICE — 40585 XL ADVANCED TRENDELENBURG POSITIONING KIT: Brand: 40585 XL ADVANCED TRENDELENBURG POSITIONING KIT

## (undated) DEVICE — HYPODERMIC SAFETY NEEDLE: Brand: MONOJECT

## (undated) DEVICE — STERILE POLYISOPRENE POWDER-FREE SURGICAL GLOVES WITH EMOLLIENT COATING: Brand: PROTEXIS

## (undated) DEVICE — SUT MNCRYL 0 CT1 27IN VIL

## (undated) DEVICE — C SECTION PACK: Brand: MEDLINE INDUSTRIES, INC.

## (undated) DEVICE — ENDOPATH PNEUMONEEDLE INSUFFLATION NEEDLES WITH LUER LOCK CONNECTORS 120MM: Brand: ENDOPATH

## (undated) DEVICE — Device: Brand: PORTEX

## (undated) DEVICE — TRY CATH FOL ADVANCE SIL W/BAG 16F

## (undated) DEVICE — DUAL LUMEN STOMACH TUBE: Brand: SALEM SUMP

## (undated) DEVICE — VIOLET BRAIDED (POLYGLACTIN 910), SYNTHETIC ABSORBABLE SUTURE: Brand: COATED VICRYL

## (undated) DEVICE — SOL IRRG H2O BG 3000ML STRL

## (undated) DEVICE — DEV TRANSF BLD W/LUER ADPT CA/198

## (undated) DEVICE — SHEET,DRAPE,70X85,STERILE: Brand: MEDLINE

## (undated) DEVICE — LAPAROSCOPIC TROCAR SLEEVE/SINGLE USE: Brand: KII® OPTICAL ACCESS SYSTEM

## (undated) DEVICE — SYR LL TP 10ML STRL

## (undated) DEVICE — KIT,ANTI FOG,W/SPONGE & FLUID,SOFT PACK: Brand: MEDLINE

## (undated) DEVICE — GLV SURG BIOGEL LTX PF 7 1/2

## (undated) DEVICE — 2, DISPOSABLE SUCTION/IRRIGATOR WITHOUT DISPOSABLE TIP: Brand: STRYKEFLOW

## (undated) DEVICE — SUT MNCRYL 4/0 PS2 18 IN